# Patient Record
Sex: FEMALE | Race: WHITE | Employment: OTHER | URBAN - METROPOLITAN AREA
[De-identification: names, ages, dates, MRNs, and addresses within clinical notes are randomized per-mention and may not be internally consistent; named-entity substitution may affect disease eponyms.]

---

## 2019-04-08 ENCOUNTER — APPOINTMENT (OUTPATIENT)
Dept: GENERAL RADIOLOGY | Age: 79
End: 2019-04-08
Attending: EMERGENCY MEDICINE
Payer: MEDICARE

## 2019-04-08 ENCOUNTER — HOSPITAL ENCOUNTER (EMERGENCY)
Age: 79
Discharge: HOME | End: 2019-04-08
Attending: EMERGENCY MEDICINE
Payer: MEDICARE

## 2019-04-08 ENCOUNTER — APPOINTMENT (OUTPATIENT)
Dept: CT IMAGING | Age: 79
End: 2019-04-08
Attending: EMERGENCY MEDICINE
Payer: MEDICARE

## 2019-04-08 VITALS
HEART RATE: 61 BPM | TEMPERATURE: 98.3 F | DIASTOLIC BLOOD PRESSURE: 81 MMHG | OXYGEN SATURATION: 96 % | SYSTOLIC BLOOD PRESSURE: 147 MMHG | BODY MASS INDEX: 23.87 KG/M2 | RESPIRATION RATE: 18 BRPM | WEIGHT: 126.32 LBS

## 2019-04-08 DIAGNOSIS — S80.01XA CONTUSION OF RIGHT KNEE, INITIAL ENCOUNTER: ICD-10-CM

## 2019-04-08 DIAGNOSIS — S09.90XA CLOSED HEAD INJURY, INITIAL ENCOUNTER: Primary | ICD-10-CM

## 2019-04-08 DIAGNOSIS — M54.50 ACUTE MIDLINE LOW BACK PAIN WITHOUT SCIATICA: ICD-10-CM

## 2019-04-08 PROCEDURE — 74011250637 HC RX REV CODE- 250/637: Performed by: EMERGENCY MEDICINE

## 2019-04-08 PROCEDURE — 72100 X-RAY EXAM L-S SPINE 2/3 VWS: CPT

## 2019-04-08 PROCEDURE — 99283 EMERGENCY DEPT VISIT LOW MDM: CPT

## 2019-04-08 PROCEDURE — 73562 X-RAY EXAM OF KNEE 3: CPT

## 2019-04-08 PROCEDURE — 70450 CT HEAD/BRAIN W/O DYE: CPT

## 2019-04-08 RX ORDER — ACETAMINOPHEN 500 MG
1000 TABLET ORAL ONCE
Status: COMPLETED | OUTPATIENT
Start: 2019-04-08 | End: 2019-04-08

## 2019-04-08 RX ADMIN — ACETAMINOPHEN 1000 MG: 500 TABLET ORAL at 14:22

## 2019-04-08 NOTE — ED TRIAGE NOTES
Triage Note: Patient reports having a fall Saturday night after chasing groceries down a hill. Patient complains of head and tailbone pain. Unknown LOC. Patient ambulatory to room with steady gait from waiting room.

## 2019-04-08 NOTE — ED PROVIDER NOTES
The history is provided by the patient and the spouse. No  was used. Fall The accident occurred 2 days ago. The fall occurred while standing and while walking. She fell from a height of ground level. She landed on concrete. There was no blood loss. The point of impact was the head and right knee. The pain is present in the head and right knee. The pain is at a severity of 9/10. The pain is severe. She was ambulatory at the scene. There was no entrapment after the fall. There was no drug use involved in the accident. There was no alcohol use involved in the accident. Associated symptoms include headaches. Pertinent negatives include no visual change, no fever, no numbness, no abdominal pain, no bowel incontinence, no nausea, no vomiting, no hematuria, no extremity weakness, no hearing loss, no loss of consciousness, no tingling and no laceration. The risk factors include being elderly. The symptoms are aggravated by activity, ambulation, standing, extension, use of injured limb and sitting. She has tried nothing for the symptoms. The treatment provided no relief. It is unknown when the patient last had a tetanus shot. Past Medical History:  
Diagnosis Date  Arthritis  Chronic pain  Diabetes ([facility] [address]  Essential hypertension, malignant 4/6/2011  Hypertension  MI, old 2017  Pre-operative cardiovascular examination 4/6/2011  Psychiatric disorder DEPRESSION/ANXIETY  PUD (peptic ulcer disease) 1970S  Pure hypercholesterolemia 4/6/2011  Type II or unspecified type diabetes mellitus without mention of complication, not stated as uncontrolled 4/6/2011 Past Surgical History:  
Procedure Laterality Date [address] [place] [address] UNLISTED BOWEL OBSTRUCTION X2  
 ABDOMEN SURGERY PROC UNLISTED    
 GASTRIC BYPASS  ABDOMEN SURGERY PROC UNLISTED    
 LAP SEBASTI[place]N  
 ABDOMEN SURGERY [address] UNLISTED  HERNIA   
 HX DILATION AND CURETTAGE    
  HX HEENT    
 EARS AS CHILD  
 HX SALPINGO-OOPHORECTOMY  HX TONSILLECTOMY  NEUROLOGICAL PROCEDURE UNLISTED    
 CERV. FUSION Family History:  
Problem Relation Age of Onset  Diabetes Mother  Hypertension Mother  Heart Disease Mother  Hypertension Father  Heart Disease Father  Cancer Father  Diabetes Father  Hypertension Sister  Hypertension Brother  Cancer Paternal Grandmother  Cancer Paternal Grandfather  Hypertension Sister  Cancer Sister Social History Socioeconomic History  Marital status:  Spouse name: Not on file  Number of children: Not on file  Years of education: Not on file  Highest education level: Not on file Occupational History  Not on file Social Needs  Financial resource strain: Not on file  Food insecurity:  
  Worry: Not on file Inability: Not on file  Transportation needs:  
  Medical: Not on file Non-medical: Not on file Tobacco Use  Smoking status: Never Smoker  Smokeless tobacco: Never Used Substance and Sexual Activity  Alcohol use: Yes Comment: RARE  Drug use: No  
 Sexual activity: Never Lifestyle  Physical activity:  
  Days per week: Not on file Minutes per session: Not on file  Stress: Not on file Relationships  Social connections:  
  Talks on phone: Not on file Gets together: Not on file Attends [employer] service: Not on file Active member of club or organization: Not on file Attends meetings of clubs or organizations: Not on file Relationship status: Not on file  Intimate partner violence:  
  Fear of current or ex partner: Not on file Emotionally abused: Not on file Physically abused: Not on file Forced sexual activity: Not on file Other Topics Concern  Not on file Social History Narrative  ** Merged History Encounter **  
    
 
 
 
 ALLERGIES: Actonel [risedronate] and Penicillin g Review of Systems Constitutional: Negative for activity change, chills and fever. HENT: Negative for nosebleeds, sore throat, trouble swallowing and voice change. Eyes: Negative for visual disturbance. Respiratory: Negative for shortness of breath. Cardiovascular: Negative for chest pain and palpitations. Gastrointestinal: Negative for abdominal pain, bowel incontinence, constipation, diarrhea, nausea and vomiting. Genitourinary: Negative for difficulty urinating, dysuria, hematuria and urgency. Musculoskeletal: Positive for arthralgias and back pain. Negative for extremity weakness, neck pain and neck stiffness. Skin: Negative for color change. Allergic/Immunologic: Negative for immunocompromised state. Neurological: Positive for headaches. Negative for dizziness, tingling, seizures, loss of consciousness, syncope, weakness, light-headedness and numbness. Psychiatric/Behavioral: Negative for behavioral problems, confusion, hallucinations, self-injury and suicidal ideas. Vitals:  
 04/08/19 1401 BP: 156/87 Pulse: 68 Resp: 18 Temp: 98.7 °F (37.1 °C) SpO2: 96% Weight: 57.3 kg (126 lb 5.2 oz) Physical Exam  
Constitutional: She appears well-developed and well-nourished. No distress. HENT:  
Head: Atraumatic. Eyes: EOM are normal.  
Neck: No tracheal deviation present. Cardiovascular:  
Warm and well perfused Pulmonary/Chest: Effort normal. No respiratory distress. Musculoskeletal:  
     Right knee: She exhibits decreased range of motion, swelling and effusion. She exhibits no ecchymosis and no deformity. Lumbar back: She exhibits bony tenderness. Neurological: She is alert. Coordination normal.  
Skin: Skin is warm and dry. No laceration noted. She is not diaphoretic. Psychiatric: She has a normal mood and affect.  Her behavior is normal. Judgment and thought content normal.  
 Nursing note and vitals reviewed. MDM This is a 62-year-old female with past medical history, review of systems, physical exam as above, presenting with complaints of low back pain, head pain, and right knee pain, secondary to ground-level fall. The patient states she experienced a ground-level fall, mechanical in nature, 2 days ago, while leaning over to  groceries. She is unclear as to whether she experienced loss of consciousness, however is experiencing ongoing symptoms as above, refractory to oral narcotic pain medications. Physical exam is remarkable for well-appearing elderly female, in no acute distress, with mild effusion of the right knee, midline tenderness to palpation in the low lumbar region, midline, atraumatic head exam, nonfocal neurologic. Given time has passed since her injury, low suspicion for significant trauma, however will obtain a head CT, right knee, and lumbar films. We will reevaluate, and make a disposition based the patient's diagnostics and response to therapy. Procedures

## 2019-04-08 NOTE — ED NOTES
Pt ambulatory out of ED with discharge instructions and prescriptions in hand given by Dr. Jorje Burciaga; pt verbalized understanding of discharge paperwork and time allotted for questions. VSS. Pt alert and oriented. Pt accompanied by spouse.

## 2019-04-08 NOTE — DISCHARGE INSTRUCTIONS
Patient Education        Back Pain: Care Instructions  Your Care Instructions    Back pain has many possible causes. It is often related to problems with muscles and ligaments of the back. It may also be related to problems with the nerves, discs, or bones of the back. Moving, lifting, standing, sitting, or sleeping in an awkward way can strain the back. Sometimes you don't notice the injury until later. Arthritis is another common cause of back pain. Although it may hurt a lot, back pain usually improves on its own within several weeks. Most people recover in 12 weeks or less. Using good home treatment and being careful not to stress your back can help you feel better sooner. Follow-up care is a key part of your treatment and safety. Be sure to make and go to all appointments, and call your doctor if you are having problems. It's also a good idea to know your test results and keep a list of the medicines you take. How can you care for yourself at home? · Sit or lie in positions that are most comfortable and reduce your pain. Try one of these positions when you lie down:  ? Lie on your back with your knees bent and supported by large pillows. ? Lie on the floor with your legs on the seat of a sofa or chair. ? Lie on your side with your knees and hips bent and a pillow between your legs. ? Lie on your stomach if it does not make pain worse. · Do not sit up in bed, and avoid soft couches and twisted positions. Bed rest can help relieve pain at first, but it delays healing. Avoid bed rest after the first day of back pain. · Change positions every 30 minutes. If you must sit for long periods of time, take breaks from sitting. Get up and walk around, or lie in a comfortable position. · Try using a heating pad on a low or medium setting for 15 to 20 minutes every 2 or 3 hours. Try a warm shower in place of one session with the heating pad. · You can also try an ice pack for 10 to 15 minutes every 2 to 3 hours. Put a thin cloth between the ice pack and your skin. · Take pain medicines exactly as directed. ? If the doctor gave you a prescription medicine for pain, take it as prescribed. ? If you are not taking a prescription pain medicine, ask your doctor if you can take an over-the-counter medicine. · Take short walks several times a day. You can start with 5 to 10 minutes, 3 or 4 times a day, and work up to longer walks. Walk on level surfaces and avoid hills and stairs until your back is better. · Return to work and other activities as soon as you can. Continued rest without activity is usually not good for your back. · To prevent future back pain, do exercises to stretch and strengthen your back and stomach. Learn how to use good posture, safe lifting techniques, and proper body mechanics. When should you call for help? Call your doctor now or seek immediate medical care if:    · You have new or worsening numbness in your legs.     · You have new or worsening weakness in your legs. (This could make it hard to stand up.)     · You lose control of your bladder or bowels.    Watch closely for changes in your health, and be sure to contact your doctor if:    · You have a fever, lose weight, or don't feel well.     · You do not get better as expected. Where can you learn more? Go to http://radha-birdie.info/. Enter Z266 in the search box to learn more about \"Back Pain: Care Instructions. \"  Current as of: September 20, 2018  Content Version: 11.9  © 1064-6303 [employer]. Care instructions adapted under license by [employer] (which disclaims liability or warranty for this information). If you have questions about a medical condition or this instruction, always ask your healthcare professional. [place] [address] any warranty or liability for your use of this information.          Patient Education     Contusion: Care Instructions  Your Care Instructions  Contusion is the medical term for a bruise. It is the result of a direct blow or an impact, such as a fall. Contusions are common sports injuries. Most people think of a bruise as a black-and-blue spot. This happens when small blood vessels get torn and leak blood under the skin. But bones, muscles, and organs can also get bruised. This may damage deep tissues but not cause a bruise you can see. The doctor will do a physical exam to find the location of your contusion. You may also have tests to make sure you do not have a more serious injury, such as a broken bone or nerve damage. These may include X-rays or other imaging tests like a CT scan or MRI. Deep-tissue contusions may cause pain and swelling. But if there is no serious damage, they will often get better in a few weeks with home treatment. The doctor has checked you carefully, but problems can develop later. If you notice any problems or new symptoms, get medical treatment right away. Follow-up care is a key part of your treatment and safety. Be sure to make and go to all appointments, and call your doctor if you are having problems. It's also a good idea to know your test results and keep a list of the medicines you take. How can you care for yourself at home? · Put ice or a cold pack on the sore area for 10 to 20 minutes at a time to stop swelling. Put a thin cloth between the ice pack and your skin. · Be safe with medicines. Read and follow all instructions on the label. ¨ If the doctor gave you a prescription medicine for pain, take it as prescribed. ¨ If you are not taking a prescription pain medicine, ask your doctor if you can take an over-the-counter medicine. · If you can, prop up the sore area on pillows as much as possible for the next few days. Try to keep the sore area above the level of your heart. When should you call for help? Call your doctor now or seek immediate medical care if:  · Your pain gets worse.   · You have new or worse swelling. · You have tingling, weakness, or numbness in the area near the contusion. · The area near the contusion is cold or pale. Watch closely for changes in your health, and be sure to contact your doctor if:  · You do not get better as expected. Where can you learn more? Go to Cleanify.be  Enter [id number] in the search box to learn more about \"Contusion: Care Instructions. \"   © 7400-8569 [employer]. Care instructions adapted under license by [facility] [employer] (which disclaims liability or warranty for this information). This care instruction is for use with your licensed healthcare professional. If you have questions about a medical condition or this instruction, always ask your healthcare professional. [address] any warranty or liability for your use of this information. Content Version: 67.8.857213; Current as of: May 22, 2015           Patient Education        Learning About a Closed Head Injury  What is a closed head injury? A closed head injury happens when your head gets hit hard. The strong force of the blow causes your brain to shake in your skull. This movement can cause the brain to bruise, swell, or tear. Sometimes nerves or blood vessels also get damaged. This can cause bleeding in or around the brain. A concussion is a type of closed head injury. What are the symptoms? If you have a mild concussion, you may have a mild headache or feel \"not quite right. \" These symptoms are common. They usually go away over a few days to 4 weeks. But sometimes after a concussion, you feel like you can't function as well as before the injury. And you have new symptoms. This is called postconcussive syndrome. You may:  · Find it harder to solve problems, think, concentrate, or remember. · Have headaches. · Have changes in your sleep patterns, such as not being able to sleep or sleeping all the time.   · Have changes in your personality. · Not be interested in your usual activities. · Feel angry or anxious without a clear reason. · Lose your sense of taste or smell. · Be dizzy, lightheaded, or unsteady. It may be hard to stand or walk. How is a closed head injury treated? Any person who may have a concussion needs to see a doctor. Some people have to stay in the hospital to be watched. Others can go home safely. If you go home, follow your doctor's instructions. He or she will tell you if you need someone to watch you closely for the next 24 hours or longer. Rest is the best treatment. Get plenty of sleep at night. And try to rest during the day. · Avoid activities that are physically or mentally demanding. These include housework, exercise, and schoolwork. And don't play video games, send text messages, or use the computer. You may need to change your school or work schedule to be able to avoid these activities. · Ask your doctor when it's okay to drive, ride a bike, or operate machinery. · Take an over-the-counter pain medicine, such as acetaminophen (Tylenol), ibuprofen (Advil, Motrin), or naproxen (Aleve). Be safe with medicines. Read and follow all instructions on the label. · Check with your doctor before you use any other medicines for pain. · Do not drink alcohol or use illegal drugs. They can slow recovery. They can also increase your risk of getting a second head injury. Follow-up care is a key part of your treatment and safety. Be sure to make and go to all appointments, and call your doctor if you are having problems. It's also a good idea to know your test results and keep a list of the medicines you take. Where can you learn more? Go to http://radha-birdie.info/. Enter E235 in the search box to learn more about \"Learning About a Closed Head Injury. \"  Current as of: Ladan 3, 2018  Content Version: 11.9  © 6708-6124 [employer].  Care instructions adapted under license by [employer] (which disclaims liability or warranty for this information). If you have questions about a medical condition or this instruction, always ask your healthcare professional. [address] any warranty or liability for your use of this information.

## 2019-05-01 ENCOUNTER — HOSPITAL ENCOUNTER (EMERGENCY)
Age: 79
Discharge: HOME | End: 2019-05-01
Attending: STUDENT IN AN ORGANIZED HEALTH CARE EDUCATION/TRAINING PROGRAM
Payer: MEDICARE

## 2019-05-01 ENCOUNTER — APPOINTMENT (OUTPATIENT)
Dept: CT IMAGING | Age: 79
End: 2019-05-01
Attending: STUDENT IN AN ORGANIZED HEALTH CARE EDUCATION/TRAINING PROGRAM
Payer: MEDICARE

## 2019-05-01 VITALS
WEIGHT: 131.39 LBS | RESPIRATION RATE: 18 BRPM | OXYGEN SATURATION: 96 % | HEART RATE: 99 BPM | TEMPERATURE: 98 F | BODY MASS INDEX: 24.81 KG/M2 | SYSTOLIC BLOOD PRESSURE: 161 MMHG | HEIGHT: 61 IN | DIASTOLIC BLOOD PRESSURE: 91 MMHG

## 2019-05-01 DIAGNOSIS — S09.90XA MINOR HEAD INJURY, INITIAL ENCOUNTER: ICD-10-CM

## 2019-05-01 DIAGNOSIS — S00.83XA FACIAL CONTUSION, INITIAL ENCOUNTER: ICD-10-CM
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
## 2019-05-01 PROCEDURE — 99283 EMERGENCY DEPT VISIT LOW MDM: CPT

## 2019-05-01 PROCEDURE — 70450 CT HEAD/BRAIN W/O DYE: CPT

## 2019-05-01 NOTE — ED TRIAGE NOTES
Pt arrives ambulatory to ed s/p fall on Monday. Pt states she fell stepping up into doorway. Pt states she hit her head, ecchymosis noted to left side forehead and intermittent blurriness to left eye. Unknown LOC, fall unwitnessed. Pt states she takes a baby ASA daily.

## 2019-05-02 NOTE — DISCHARGE INSTRUCTIONS
Patient Education        Learning About a Closed Head Injury  What is a closed head injury? A closed head injury happens when your head gets hit hard. The strong force of the blow causes your brain to shake in your skull. This movement can cause the brain to bruise, swell, or tear. Sometimes nerves or blood vessels also get damaged. This can cause bleeding in or around the brain. A concussion is a type of closed head injury. What are the symptoms? If you have a mild concussion, you may have a mild headache or feel \"not quite right. \" These symptoms are common. They usually go away over a few days to 4 weeks. But sometimes after a concussion, you feel like you can't function as well as before the injury. And you have new symptoms. This is called postconcussive syndrome. You may:  · Find it harder to solve problems, think, concentrate, or remember. · Have headaches. · Have changes in your sleep patterns, such as not being able to sleep or sleeping all the time. · Have changes in your personality. · Not be interested in your usual activities. · Feel angry or anxious without a clear reason. · Lose your sense of taste or smell. · Be dizzy, lightheaded, or unsteady. It may be hard to stand or walk. How is a closed head injury treated? Any person who may have a concussion needs to see a doctor. Some people have to stay in the hospital to be watched. Others can go home safely. If you go home, follow your doctor's instructions. He or she will tell you if you need someone to watch you closely for the next 24 hours or longer. Rest is the best treatment. Get plenty of sleep at night. And try to rest during the day. · Avoid activities that are physically or mentally demanding. These include housework, exercise, and schoolwork. And don't play video games, send text messages, or use the computer. You may need to change your school or work schedule to be able to avoid these activities.   · Ask your doctor when it's okay to drive, ride a bike, or operate machinery. · Take an over-the-counter pain medicine, such as acetaminophen (Tylenol), ibuprofen (Advil, Motrin), or naproxen (Aleve). Be safe with medicines. Read and follow all instructions on the label. · Check with your doctor before you use any other medicines for pain. · Do not drink alcohol or use illegal drugs. They can slow recovery. They can also increase your risk of getting a second head injury. Follow-up care is a key part of your treatment and safety. Be sure to make and go to all appointments, and call your doctor if you are having problems. It's also a good idea to know your test results and keep a list of the medicines you take. Where can you learn more? Go to http://radha-birdie.info/. Enter E235 in the search box to learn more about \"Learning About a Closed Head Injury. \"  Current as of: Ladan 3, 2018  Content Version: 11.9  © 3460-6313 [employer]. Care instructions adapted under license by [employer] (which disclaims liability or warranty for this information). If you have questions about a medical condition or this instruction, always ask your healthcare professional. [place] [address] any warranty or liability for your use of this information. Patient Education        Preventing Outdoor Falls: Care Instructions  Your Care Instructions    Worries about falls don't need to keep you indoors. Outdoor activities like walking have big benefits for your health. You will need to watch your step and learn a few safety measures. If you are worried about having a fall outdoors, ask your doctor about exercises, classes, or physical therapy that may help. You can learn ways to gain strength, flexibility, and balance. Ask if it might help to use a cane or walker. You can make your time outdoors safer with a few simple measures. Follow-up care is a key part of your treatment and safety.  Be sure to make and go to all appointments, and call your doctor if you are having problems. It's also a good idea to know your test results and keep a list of the medicines you take. How can you prevent falls outdoors? · Wear shoes with firm soles and low heels. If you have to walk on an icy surface, use grippers that can be worn over your shoes in bad weather. · Be extra careful if weather is bad. Walk on the grass when the sidewalks are slick. If you live in a place that gets snow and ice in the winter, sprinkle salt on slippery stairs and sidewalks. · Be careful getting on or off buses and trains or getting in and out of cars. If handrails are available, use them. · Be careful when you cross the street. Look for crosswalks or places where curb cuts or ramps are present. · Try not to hurry, especially if you are carrying something. · Be cautious in parking lots or garages. There may be curbs or changes in pavement, or the height of the pavement may vary. · Make sure to wear the correct eyeglasses, if you need them. Reading glasses or bifocals can make it harder to see hazards that might be in your way. · If you are walking outdoors for exercise, try to:  ? Walk in well-lighted, well-maintained areas. These include high school or college tracks, shopping malls, and public spaces. ? Walk with a partner. ? Watch out for cracked sidewalks, curbs, changes in the height of the pavement, exposed tree roots, and debris such as fallen leaves or branches. Where can you learn more? Go to http://radha-birdie.info/. Enter F774 in the search box to learn more about \"Preventing Outdoor Falls: Care Instructions. \"  Current as of: March 15, 2018  Content Version: 11.9  © 2675-7808 [employer]. Care instructions adapted under license by [employer] (which disclaims liability or warranty for this information).  If you have questions about a medical condition or this instruction, always ask your healthcare professional. [place] [address] any warranty or liability for your use of this information.

## 2019-05-02 NOTE — ED NOTES
Provided with d/c instructions, pt verbalized understanding. Ambulated out with steady gait, no changes to previous assessment except as noted.

## 2019-05-02 NOTE — ED PROVIDER NOTES
The history is provided by the patient and the spouse. Fall The accident occurred 2 days ago. The fall occurred while walking (tripped over door ledge while entering house). She fell from a height of ground level. She landed on hard floor. There was no blood loss. The point of impact was the head. The pain is present in the head (L forehead and L upper face). The pain is moderate. She was ambulatory at the scene. There was no entrapment after the fall. Associated symptoms include visual change (mild L blurry vision) and headaches. Pertinent negatives include no fever, no numbness, no abdominal pain, no vomiting, no extremity weakness, no hearing loss, no loss of consciousness, no tingling and no laceration. The risk factors include being elderly and recurrent falls (on ASA). The symptoms are aggravated by pressure on injury. Treatments tried: appplied makeup over injured area due to [address] Past Medical History:  
Diagnosis Date  Arthritis  Chronic pain  Diabetes ([facility] [address]  Essential hypertension, malignant 4/6/2011  Hypertension  MI, old 2017  Pre-operative cardiovascular examination 4/6/2011  Psychiatric disorder DEPRESSION/ANXIETY  PUD (peptic ulcer disease) 1970S  Pure hypercholesterolemia 4/6/2011  Type II or unspecified type diabetes mellitus without mention of complication, not stated as uncontrolled 4/6/2011 Past Surgical History:  
Procedure Laterality Date [address] UNLISTED BOWEL OBSTRUCTION X2  
 ABDOMEN SURGERY PROC UNLISTED    
 GASTRIC BYPASS  ABDOMEN SURGERY PROC UNLISTED    
 LAP SEBASTIÁN  
 ABDOMEN SURGERY [address] UNLISTED HERNIA   
 HX DILATION AND CURETTAGE    
 HX HEENT    
 EARS AS CHILD  
 HX SALPINGO-OOPHORECTOMY  HX TONSILLECTOMY  NEUROLOGICAL PROCEDURE UNLISTED    
 CERV. FUSION Family History:  
Problem Relation Age of Onset  Diabetes Mother  Hypertension Mother  Heart Disease Mother  Hypertension Father  Heart Disease Father  Cancer Father  Diabetes Father  Hypertension Sister  Hypertension Brother  Cancer Paternal Grandmother  Cancer Paternal Grandfather  Hypertension Sister  Cancer Sister Social History Socioeconomic History  Marital status:  Spouse name: Not on file  Number of children: Not on file  Years of education: Not on file  Highest education level: Not on file Occupational History  Not on file Social Needs  Financial resource strain: Not on file  Food insecurity:  
  Worry: Not on file Inability: Not on file  Transportation needs:  
  Medical: Not on file Non-medical: Not on file Tobacco Use  Smoking status: Never Smoker  Smokeless tobacco: Never Used Substance and Sexual Activity  Alcohol use: Yes Comment: RARE  Drug use: No  
 Sexual activity: Never Lifestyle  Physical activity:  
  Days per week: Not on file Minutes per session: Not on file  Stress: Not on file Relationships  Social connections:  
  Talks on phone: Not on file Gets together: Not on file Attends Hindu service: Not on file Active member of club or organization: Not on file Attends meetings of clubs or organizations: Not on file Relationship status: Not on file  Intimate partner violence:  
  Fear of current or ex partner: Not on file Emotionally abused: Not on file Physically abused: Not on file Forced sexual activity: Not on file Other Topics Concern  Not on file Social History Narrative ** Merged History Encounter ** ALLERGIES: Actonel [risedronate] and Penicillin g Review of Systems Constitutional: Negative for chills and fever. HENT: Positive for facial swelling (minimal over L forehead). Negative for sore throat. Eyes: Positive for visual disturbance (L blurry vision). Respiratory: Negative for cough and shortness of breath. Cardiovascular: Negative for chest pain. Gastrointestinal: Negative for abdominal pain and vomiting. Genitourinary: Negative for dysuria. Musculoskeletal: Negative for back pain and extremity weakness. Skin: Positive for color change (L forehead brusing). Negative for rash. Neurological: Positive for headaches. Negative for tingling, loss of consciousness, syncope, facial asymmetry, weakness and numbness. All other systems reviewed and are negative. Vitals:  
 05/01/19 1930 05/01/19 1934 BP:  (!) 161/91 Pulse:  99 Resp:  18 Temp:  98 °F (36.7 °C) SpO2:  96% Weight: 59.6 kg (131 lb 6.3 oz) Height: 5' 1\" (1.549 m) Physical Exam  
Constitutional: She is oriented to person, place, and time. She appears well-developed. No distress. HENT:  
Head: Normocephalic. Head is with contusion. Head is without raccoon's eyes, without Courtney's sign, without laceration and without left periorbital erythema. Eyes: Pupils are equal, round, and reactive to light. Conjunctivae, EOM and lids are normal. Right eye exhibits no nystagmus. Left eye exhibits no nystagmus. Neck: Normal range of motion. Neck supple. No spinous process tenderness present. Normal range of motion present. Cardiovascular: Normal rate, regular rhythm and normal heart sounds. Pulmonary/Chest: Effort normal and breath sounds normal. No respiratory distress. Abdominal: Soft. There is no tenderness. There is no guarding. Musculoskeletal: Normal range of motion. She exhibits no edema. Neurological: She is alert and oriented to person, place, and time. She exhibits normal muscle tone. Skin: Skin is warm and dry. No laceration noted. MDM Procedures 8:28 PM 
The patient presented with a complaint of a fall or minor trauma.  The patient is now resting comfortably and feels better, is alert and in no distress. The patient has a normal mental status and is neurologically intact. The history, exam, diagnostic testing (if any) and current condition do not demonstrate signs of clinically significant intra-cranial, intra-thoracic, intra-abdominal, or musculoskeletal trauma. The vital signs have been stable. The patient's condition is stable and appropriate for discharge. The patient will pursue further outpatient evaluation with the primary care physician or other designated or consulting physician as indicated in the discharge instructions.

## 2021-06-11 ENCOUNTER — VIRTUAL VISIT (OUTPATIENT)
Dept: FAMILY MEDICINE CLINIC | Age: 81
End: 2021-06-11

## 2021-08-18 ENCOUNTER — TELEPHONE (OUTPATIENT)
Dept: FAMILY MEDICINE CLINIC | Age: 81
End: 2021-08-18

## 2021-08-18 NOTE — TELEPHONE ENCOUNTER
----- Message from [facility] sent at 8/16/2021  4:36 PM EDT -----  Regarding: MD Brown/telephone  General Message/Vendor Calls    Caller's first and last name:N/A      Reason for call: NP Appoinment      Callback required yes/no and why:      Best contact number(s):[phone]      Details to clarify the request:Patient missed NP appointment due to thinking it was for another day and would like to reschedule it.       Chelsi Lewis

## 2022-10-13 ENCOUNTER — APPOINTMENT (OUTPATIENT)
Dept: GENERAL RADIOLOGY | Age: 82
DRG: 316 | End: 2022-10-13
Attending: STUDENT IN AN ORGANIZED HEALTH CARE EDUCATION/TRAINING PROGRAM
Payer: MEDICARE

## 2022-10-13 ENCOUNTER — APPOINTMENT (OUTPATIENT)
Dept: CT IMAGING | Age: 82
DRG: 316 | End: 2022-10-13
Attending: STUDENT IN AN ORGANIZED HEALTH CARE EDUCATION/TRAINING PROGRAM
Payer: MEDICARE

## 2022-10-13 ENCOUNTER — HOSPITAL ENCOUNTER (INPATIENT)
Age: 82
LOS: 4 days | Discharge: HOME | DRG: 316 | End: 2022-10-17
Attending: STUDENT IN AN ORGANIZED HEALTH CARE EDUCATION/TRAINING PROGRAM | Admitting: FAMILY MEDICINE
Payer: MEDICARE

## 2022-10-13 DIAGNOSIS — I50.9 ACUTE ON CHRONIC CONGESTIVE HEART FAILURE, UNSPECIFIED HEART FAILURE TYPE (HCC): Primary | ICD-10-CM

## 2022-10-13 LAB
ALBUMIN SERPL-MCNC: 3.5 G/DL (ref 3.5–5)
ALBUMIN/GLOB SERPL: 0.8 {RATIO} (ref 1.1–2.2)
ALP SERPL-CCNC: 131 U/L (ref 45–117)
ALT SERPL-CCNC: 21 U/L (ref 12–78)
ANION GAP SERPL CALC-SCNC: 9 MMOL/L (ref 5–15)
AST SERPL-CCNC: 25 U/L (ref 15–37)
BASOPHILS # BLD: 0.1 K/UL (ref 0–0.1)
BASOPHILS NFR BLD: 1 % (ref 0–1)
BILIRUB SERPL-MCNC: 0.4 MG/DL (ref 0.2–1)
BNP SERPL-MCNC: 451 PG/ML (ref 0–450)
BUN SERPL-MCNC: 8 MG/DL (ref 6–20)
BUN/CREAT SERPL: 7 (ref 12–20)
CALCIUM SERPL-MCNC: 9.1 MG/DL (ref 8.5–10.1)
CHLORIDE SERPL-SCNC: 101 MMOL/L (ref 97–108)
CO2 SERPL-SCNC: 28 MMOL/L (ref 21–32)
CREAT SERPL-MCNC: 1.19 MG/DL (ref 0.55–1.02)
DIFFERENTIAL METHOD BLD: ABNORMAL
EOSINOPHIL # BLD: 0.1 K/UL (ref 0–0.4)
EOSINOPHIL NFR BLD: 1 % (ref 0–7)
ERYTHROCYTE [DISTWIDTH] IN BLOOD BY AUTOMATED COUNT: 19.2 % (ref 11.5–14.5)
GLOBULIN SER CALC-MCNC: 4.6 G/DL (ref 2–4)
GLUCOSE SERPL-MCNC: 193 MG/DL (ref 65–100)
HCT VFR BLD AUTO: 39.4 % (ref 35–47)
HGB BLD-MCNC: 12.4 G/DL (ref 11.5–16)
IMM GRANULOCYTES # BLD AUTO: 0 K/UL (ref 0–0.04)
IMM GRANULOCYTES NFR BLD AUTO: 0 % (ref 0–0.5)
LYMPHOCYTES # BLD: 1 K/UL (ref 0.8–3.5)
LYMPHOCYTES NFR BLD: 9 % (ref 12–49)
MCH RBC QN AUTO: 26.3 PG (ref 26–34)
MCHC RBC AUTO-ENTMCNC: 31.5 G/DL (ref 30–36.5)
MCV RBC AUTO: 83.5 FL (ref 80–99)
MONOCYTES # BLD: 0.8 K/UL (ref 0–1)
MONOCYTES NFR BLD: 8 % (ref 5–13)
NEUTS SEG # BLD: 8.6 K/UL (ref 1.8–8)
NEUTS SEG NFR BLD: 81 % (ref 32–75)
NRBC # BLD: 0 K/UL (ref 0–0.01)
NRBC BLD-RTO: 0 PER 100 WBC
PLATELET # BLD AUTO: 428 K/UL (ref 150–400)
PMV BLD AUTO: 9.5 FL (ref 8.9–12.9)
POTASSIUM SERPL-SCNC: 3.1 MMOL/L (ref 3.5–5.1)
PROT SERPL-MCNC: 8.1 G/DL (ref 6.4–8.2)
RBC # BLD AUTO: 4.72 M/UL (ref 3.8–5.2)
SODIUM SERPL-SCNC: 138 MMOL/L (ref 136–145)
TROPONIN-HIGH SENSITIVITY: 8 NG/L (ref 0–51)
WBC # BLD AUTO: 10.6 K/UL (ref 3.6–11)

## 2022-10-13 PROCEDURE — 83880 ASSAY OF NATRIURETIC PEPTIDE: CPT

## 2022-10-13 PROCEDURE — 93005 ELECTROCARDIOGRAM TRACING: CPT

## 2022-10-13 PROCEDURE — 85025 COMPLETE CBC W/AUTO DIFF WBC: CPT

## 2022-10-13 PROCEDURE — 36415 COLL VENOUS BLD VENIPUNCTURE: CPT

## 2022-10-13 PROCEDURE — 65270000046 HC RM TELEMETRY

## 2022-10-13 PROCEDURE — 74011000636 HC RX REV CODE- 636: Performed by: FAMILY MEDICINE

## 2022-10-13 PROCEDURE — 71275 CT ANGIOGRAPHY CHEST: CPT

## 2022-10-13 PROCEDURE — 71046 X-RAY EXAM CHEST 2 VIEWS: CPT

## 2022-10-13 PROCEDURE — 74011250637 HC RX REV CODE- 250/637: Performed by: STUDENT IN AN ORGANIZED HEALTH CARE EDUCATION/TRAINING PROGRAM

## 2022-10-13 PROCEDURE — 74011250636 HC RX REV CODE- 250/636: Performed by: EMERGENCY MEDICINE

## 2022-10-13 PROCEDURE — 99285 EMERGENCY DEPT VISIT HI MDM: CPT

## 2022-10-13 PROCEDURE — 74011250636 HC RX REV CODE- 250/636: Performed by: STUDENT IN AN ORGANIZED HEALTH CARE EDUCATION/TRAINING PROGRAM

## 2022-10-13 PROCEDURE — 80053 COMPREHEN METABOLIC PANEL: CPT

## 2022-10-13 PROCEDURE — 84484 ASSAY OF TROPONIN QUANT: CPT

## 2022-10-13 RX ORDER — AZITHROMYCIN 250 MG/1
250 TABLET, FILM COATED ORAL DAILY
COMMUNITY
End: 2022-10-17

## 2022-10-13 RX ORDER — POTASSIUM CHLORIDE 750 MG/1
20 TABLET, FILM COATED, EXTENDED RELEASE ORAL
Status: COMPLETED | OUTPATIENT
Start: 2022-10-13 | End: 2022-10-13

## 2022-10-13 RX ORDER — FUROSEMIDE 10 MG/ML
20 INJECTION INTRAMUSCULAR; INTRAVENOUS
Status: COMPLETED | OUTPATIENT
Start: 2022-10-13 | End: 2022-10-13

## 2022-10-13 RX ORDER — HYDRALAZINE HYDROCHLORIDE 20 MG/ML
20 INJECTION INTRAMUSCULAR; INTRAVENOUS ONCE
Status: COMPLETED | OUTPATIENT
Start: 2022-10-13 | End: 2022-10-13

## 2022-10-13 RX ADMIN — IOPAMIDOL 80 ML: 755 INJECTION, SOLUTION INTRAVENOUS at 20:47

## 2022-10-13 RX ADMIN — POTASSIUM CHLORIDE 20 MEQ: 750 TABLET, FILM COATED, EXTENDED RELEASE ORAL at 21:23

## 2022-10-13 RX ADMIN — HYDRALAZINE HYDROCHLORIDE 20 MG: 20 INJECTION INTRAMUSCULAR; INTRAVENOUS at 22:44

## 2022-10-13 RX ADMIN — FUROSEMIDE 20 MG: 10 INJECTION, SOLUTION INTRAVENOUS at 21:23

## 2022-10-13 NOTE — DISCHARGE INSTRUCTIONS
Discharge Instructions       PATIENT ID: Leanne Uribe  MRN: [id number]   YOB: 1940    DATE OF ADMISSION: 10/13/2022  5:26 PM    DATE OF DISCHARGE: 10/17/2022    PRIMARY CARE PROVIDER: None     ATTENDING PHYSICIAN: Yolanda Haskins MD  DISCHARGING PROVIDER: Mariana Prasad PA-C    To contact this individual call [phone] and ask the  to page. If unavailable ask to be transferred the Adult Hospitalist Department. DISCHARGE DIAGNOSES Hypertensive urgency    CONSULTATIONS: IP CONSULT TO CARDIOLOGY  IP CONSULT TO CARDIOLOGY  IP CONSULT TO PULMONOLOGY    PROCEDURES/SURGERIES: * No surgery found *    PENDING TEST RESULTS:   At the time of discharge the following test results are still pending: None    FOLLOW UP APPOINTMENTS:   Follow-up Information       Follow up With Specialties Details Why Contact Info    Stephanie Ruano MD Cardiovascular Disease Physician Go on 10/27/2022 at 12:30 on 10/27/22 Edmar Su Robles  [phone]      None    None (395) Patient stated that they have no PCP               ADDITIONAL CARE RECOMMENDATIONS: Follow up with PCP, pulmonology, cardiology as directed. DIET: Regular Diet    ACTIVITY: Activity as tolerated    WOUND CARE:     EQUIPMENT needed:       DISCHARGE MEDICATIONS:   See Medication Reconciliation Form    It is important that you take the medication exactly as they are prescribed. Keep your medication in the bottles provided by the pharmacist and keep a list of the medication names, dosages, and times to be taken in your wallet. Do not take other medications without consulting your doctor. NOTIFY YOUR PHYSICIAN FOR ANY OF THE FOLLOWING:   Fever over 101 degrees for 24 hours. Chest pain, shortness of breath, fever, chills, nausea, vomiting, diarrhea, change in mentation, falling, weakness, bleeding. Severe pain or pain not relieved by medications.   Or, any other signs or symptoms that you may have questions about.       DISPOSITION:    Home With:   OT  PT  HH  RN       SNF/Inpatient Rehab/LTAC    Independent/assisted living    Hospice    Other:     CDMP Checked:   Yes x     PROBLEM LIST Updated:  Yes x       Signed:   Joe Mckinney PA-C  10/17/2022  1:03 PM

## 2022-10-13 NOTE — ED PROVIDER NOTES
Jude Solorio is a 81 y.o. female with past medical history notable for arthritis, chronic pain, diabetes, hypertension, MI, peptic ulcer disease, hypercholesterolemia presenting with 1 week of gradually worsening dyspnea on exertion, orthopnea and cough. The cough is nonproductive. Denies chest pain recently. Has had a stable medication regimen but has not taken her blood pressure medication for the last several days \"did not feel like it because I was not feeling well\". Does note that her symptoms are worse with laying flat or any exertion review of her medications shows that she was on furosemide daily. Past Medical History:   Diagnosis Date    Arthritis     Chronic pain     Diabetes ([facility] [address]     Essential hypertension, malignant 4/6/2011    Hypertension     MI, old 2017    Pre-operative cardiovascular examination 4/6/2011    Psychiatric disorder     DEPRESSION/ANXIETY    PUD (peptic ulcer disease)     1970S    Pure hypercholesterolemia 4/6/2011    Type II or unspecified type diabetes mellitus without mention of complication, not stated as uncontrolled 4/6/2011       Past Surgical History:   Procedure Laterality Date    HX DILATION AND CURETTAGE      HX HEENT      EARS AS CHILD    HX SALPINGO-OOPHORECTOMY      HX TONSILLECTOMY      NEUROLOGICAL PROCEDURE UNLISTED      CERV.  FUSION    [place] ABDOMEN SURGERY PROC UNLISTED      BOWEL OBSTRUCTION X2    [place] ABDOMEN SURGERY PROC UNLISTED      GASTRIC BYPASS    [place] ABDOMEN SURGERY PROC UNLISTED      LAP SEBASTIÁN    [place] ABDOMEN SURGERY PROC UNLISTED      HERNIA          Family History:   Problem Relation Age of Onset    Diabetes Mother     Hypertension Mother     Heart Disease Mother     Hypertension Father     Heart Disease Father     Cancer Father     Diabetes Father     Hypertension Sister     Hypertension Brother     Cancer Paternal Grandmother     Cancer Paternal Grandfather     Hypertension Sister     Cancer Sister        Social History     Socioeconomic History Marital status:      Spouse name: Not on file    Number of children: Not on file    Years of education: Not on file    Highest education level: Not on file   Occupational History    Not on file   Tobacco Use    Smoking status: Never    Smokeless tobacco: Never   Substance and Sexual Activity    Alcohol use: Yes     Comment: RARE    Drug use: No    Sexual activity: Never   Other Topics Concern    Not on file   Social History Narrative    ** Merged History Encounter **          Social Determinants of Health     Financial Resource Strain: Not on file   Food Insecurity: Not on file   Transportation Needs: Not on file   Physical Activity: Not on file   Stress: Not on file   Social Connections: Not on file   Intimate Partner Violence: Not on file   Housing Stability: Not on file         ALLERGIES: Actonel [risedronate] and Penicillin g    Review of Systems   Constitutional:  Positive for fatigue. Negative for chills and fever. Eyes:  Negative for photophobia. Respiratory:  Positive for shortness of breath. Negative for cough. Cardiovascular:  Negative for chest pain and leg swelling. Gastrointestinal:  Positive for nausea and vomiting. Negative for abdominal pain. Genitourinary:  Negative for dysuria. Musculoskeletal:  Negative for back pain. Neurological:  Negative for headaches. Psychiatric/Behavioral:  Negative for confusion. All other systems reviewed and are negative. Vitals:    10/13/22 1727   BP: (!) 182/74   Pulse: 88   Resp: 20   Temp: 97.8 °F (36.6 °C)   SpO2: 95%   Weight: 75.3 kg (166 lb 0.1 oz)   Height: 5' (1.524 m)            Physical Exam  Vitals reviewed. Constitutional:       General: She is not in acute distress. Appearance: She is not toxic-appearing. HENT:      Head: Normocephalic and atraumatic. Mouth/Throat:      Mouth: Mucous membranes are moist.   Eyes:      Extraocular Movements: Extraocular movements intact.    Cardiovascular:      Rate and Rhythm: Normal rate and regular rhythm. Heart sounds: Normal heart sounds. Pulmonary:      Effort: Pulmonary effort is normal. No respiratory distress. Breath sounds: Normal breath sounds. Abdominal:      Palpations: Abdomen is soft. Tenderness: There is no abdominal tenderness. Musculoskeletal:      Cervical back: Normal range of motion. Right lower leg: No edema. Left lower leg: No edema. Skin:     Capillary Refill: Capillary refill takes less than 2 seconds. Neurological:      General: No focal deficit present. Mental Status: She is alert and oriented to person, place, and time. Psychiatric:         Mood and Affect: Mood normal.        MDM     Amount and/or Complexity of Data Reviewed  Clinical lab tests: reviewed  Tests in the radiology section of CPT®: reviewed  Tests in the medicine section of CPT®: reviewed           MEDICAL DECISION MAKIN [address] for Admission  8:17 PM    ED Room Number: SER01/01  Patient Name and age:  Pawel Blood 81 y.o.  female  Working Diagnosis:   1. Acute on chronic congestive heart failure, unspecified heart failure type ([facility] [address]        COVID-19 Suspicion:  no  Sepsis present:  no  Reassessment needed: no  Code Status:  Full Code  Readmission: no  Isolation Requirements:  no  Recommended Level of Care:  telemetry  Department:[place] [facility] - [phone]  Other:     81 y.o. female presents with Shortness of Breath and Cough    On further questioning she relates that she had a heart attack in 2017 and had a resultant EF of 10%. She has been on Lasix chronically. She has missed doses recently. She states that she had an issue with one of the members of her primary care physician staff and so has not been seen recently. She is without a primary care physician. She was seen at [facility] and had a negative work-up essentially including an x-ray. She not have a CT at that time. Pain 1 now.   Given debilitating dyspnea on exertion and orthopnea, history of ischemic cardiomyopathy would admit for further evaluation, repeat echocardiogram and cardiology evaluation. She is seen by Dr. Anna Antonio of [place] cardiovascular specialist.  LABORATORY TESTS:  Labs Reviewed   CBC WITH AUTOMATED DIFF - Abnormal; Notable for the following components:       Result Value    RDW 19.2 (*)     PLATELET 594 (*)     NEUTROPHILS 81 (*)     LYMPHOCYTES 9 (*)     ABS. NEUTROPHILS 8.6 (*)     All other components within normal limits   METABOLIC PANEL, COMPREHENSIVE - Abnormal; Notable for the following components:    Potassium 3.1 (*)     Glucose 193 (*)     Creatinine 1.19 (*)     BUN/Creatinine ratio 7 (*)     eGFR 46 (*)     Alk. phosphatase 131 (*)     Globulin 4.6 (*)     A-G Ratio 0.8 (*)     All other components within normal limits   NT-PRO BNP - Abnormal; Notable for the following components:    NT pro- (*)     All other components within normal limits   TROPONIN-HIGH SENSITIVITY   SAMPLES BEING HELD       IMAGING RESULTS:  XR CHEST PA LAT   Final Result   No acute cardiopulmonary disease. CTA CHEST W OR W WO CONT    (Results Pending)       MEDICATIONS GIVEN:  Medications   furosemide (LASIX) injection 20 mg (has no administration in time range)   potassium chloride SR (KLOR-CON 10) tablet 20 mEq (has no administration in time range)       PROGRESS NOTE:   8:19 PM Patient has remained stable     EKG:  Reviewed   1743  Normal sinus rhythm, ventricular rate 82, normal axis, left axis deviation, right bundle branch block, left anterior fascicular block, no ST elevation    CONSULTS:  Hospitalist Consult: [address] [place] [address] for Admission  8:21 PM    ED Room Number: SER01/01  Patient Name and age:  Naif Hogan 81 y.o.  female  Working Diagnosis:   1. Acute on chronic congestive heart failure, unspecified heart failure type ([facility] [address]        IMPRESSION:  1.  Acute on chronic congestive heart failure, unspecified heart failure type ([facility] [address] PLAN:  - Admit to hospitalist    Erika Clark MD      Please note that this dictation was completed with [employer], the computer voice recognition software. Quite often unanticipated grammatical, syntax, homophones, and other interpretive errors are inadvertently transcribed by the computer software. Please disregard these errors. Please excuse any errors that have escaped final proofreading.       Procedures

## 2022-10-13 NOTE — ED TRIAGE NOTES
81year old female pt comes to the ED via POV for a CC Shortness of breath. Pt states that she went to [facility] a week ago and was prescribed a zpack but did not help. Pt does not have any pain and feels like there is fluid in her. Pt is A&Ox4.

## 2022-10-14 ENCOUNTER — APPOINTMENT (OUTPATIENT)
Dept: NON INVASIVE DIAGNOSTICS | Age: 82
DRG: 316 | End: 2022-10-14
Attending: STUDENT IN AN ORGANIZED HEALTH CARE EDUCATION/TRAINING PROGRAM
Payer: MEDICARE

## 2022-10-14 LAB
ALBUMIN SERPL-MCNC: 3.1 G/DL (ref 3.5–5)
ALBUMIN/GLOB SERPL: 0.8 {RATIO} (ref 1.1–2.2)
ALP SERPL-CCNC: 115 U/L (ref 45–117)
ALT SERPL-CCNC: 18 U/L (ref 12–78)
ANION GAP SERPL CALC-SCNC: 7 MMOL/L (ref 5–15)
APTT PPP: 23.3 SEC (ref 22.1–31)
ARTERIAL PATENCY WRIST A: ABNORMAL
AST SERPL-CCNC: 32 U/L (ref 15–37)
ATRIAL RATE: 82 BPM
BASE EXCESS BLD CALC-SCNC: 1.8 MMOL/L
BASOPHILS # BLD: 0.1 K/UL (ref 0–0.1)
BASOPHILS NFR BLD: 1 % (ref 0–1)
BDY SITE: ABNORMAL
BILIRUB SERPL-MCNC: 0.4 MG/DL (ref 0.2–1)
BUN SERPL-MCNC: 11 MG/DL (ref 6–20)
BUN/CREAT SERPL: 11 (ref 12–20)
CALCIUM SERPL-MCNC: 8.8 MG/DL (ref 8.5–10.1)
CALCULATED P AXIS, ECG09: -13 DEGREES
CALCULATED R AXIS, ECG10: -60 DEGREES
CALCULATED T AXIS, ECG11: 1 DEGREES
CHLORIDE SERPL-SCNC: 103 MMOL/L (ref 97–108)
CHOLEST SERPL-MCNC: 142 MG/DL
CO2 SERPL-SCNC: 26 MMOL/L (ref 21–32)
CREAT SERPL-MCNC: 0.99 MG/DL (ref 0.55–1.02)
DIAGNOSIS, 93000: NORMAL
DIFFERENTIAL METHOD BLD: ABNORMAL
ECHO AO ROOT DIAM: 3.4 CM
ECHO AO ROOT INDEX: 2.01 CM/M2
ECHO AV AREA PEAK VELOCITY: 2.9 CM2
ECHO AV AREA/BSA PEAK VELOCITY: 1.7 CM2/M2
ECHO AV PEAK GRADIENT: 11 MMHG
ECHO AV PEAK VELOCITY: 1.7 M/S
ECHO AV VELOCITY RATIO: 0.65
ECHO EST RA PRESSURE: 3 MMHG
ECHO LA DIAMETER INDEX: 1.89 CM/M2
ECHO LA DIAMETER: 3.2 CM
ECHO LA TO AORTIC ROOT RATIO: 0.94
ECHO LV E' LATERAL VELOCITY: 7 CM/S
ECHO LV E' SEPTAL VELOCITY: 6 CM/S
ECHO LV FRACTIONAL SHORTENING: 29 % (ref 28–44)
ECHO LV INTERNAL DIMENSION DIASTOLE INDEX: 3.31 CM/M2
ECHO LV INTERNAL DIMENSION DIASTOLIC: 5.6 CM (ref 3.9–5.3)
ECHO LV INTERNAL DIMENSION SYSTOLIC INDEX: 2.37 CM/M2
ECHO LV INTERNAL DIMENSION SYSTOLIC: 4 CM
ECHO LV IVSD: 1.1 CM (ref 0.6–0.9)
ECHO LV MASS 2D: 191.6 G (ref 67–162)
ECHO LV MASS INDEX 2D: 113.4 G/M2 (ref 43–95)
ECHO LV POSTERIOR WALL DIASTOLIC: 0.7 CM (ref 0.6–0.9)
ECHO LV RELATIVE WALL THICKNESS RATIO: 0.25
ECHO LVOT AREA: 4.5 CM2
ECHO LVOT DIAM: 2.4 CM
ECHO LVOT PEAK GRADIENT: 5 MMHG
ECHO LVOT PEAK VELOCITY: 1.1 M/S
ECHO MV A VELOCITY: 1.1 M/S
ECHO MV AREA PHT: 3.3 CM2
ECHO MV E DECELERATION TIME (DT): 231 MS
ECHO MV E VELOCITY: 0.55 M/S
ECHO MV E/A RATIO: 0.5
ECHO MV E/E' LATERAL: 7.86
ECHO MV E/E' RATIO (AVERAGED): 8.51
ECHO MV E/E' SEPTAL: 9.17
ECHO MV PRESSURE HALF TIME (PHT): 67 MS
ECHO MV REGURGITANT PEAK GRADIENT: 3 MMHG
ECHO MV REGURGITANT PEAK VELOCITY: 0.9 M/S
ECHO RIGHT VENTRICULAR SYSTOLIC PRESSURE (RVSP): 14 MMHG
ECHO RV FREE WALL PEAK S': 15 CM/S
ECHO RV TAPSE: 1.8 CM (ref 1.7–?)
ECHO TV REGURGITANT MAX VELOCITY: 1.63 M/S
ECHO TV REGURGITANT PEAK GRADIENT: 11 MMHG
EOSINOPHIL # BLD: 0.2 K/UL (ref 0–0.4)
EOSINOPHIL NFR BLD: 2 % (ref 0–7)
ERYTHROCYTE [DISTWIDTH] IN BLOOD BY AUTOMATED COUNT: 19.3 % (ref 11.5–14.5)
EST. AVERAGE GLUCOSE BLD GHB EST-MCNC: 151 MG/DL
GAS FLOW.O2 O2 DELIVERY SYS: ABNORMAL L/MIN
GLOBULIN SER CALC-MCNC: 4.1 G/DL (ref 2–4)
GLUCOSE BLD STRIP.AUTO-MCNC: 135 MG/DL (ref 65–117)
GLUCOSE BLD STRIP.AUTO-MCNC: 145 MG/DL (ref 65–117)
GLUCOSE BLD STRIP.AUTO-MCNC: 181 MG/DL (ref 65–117)
GLUCOSE BLD STRIP.AUTO-MCNC: 185 MG/DL (ref 65–117)
GLUCOSE SERPL-MCNC: 157 MG/DL (ref 65–100)
HBA1C MFR BLD: 6.9 % (ref 4–5.6)
HCO3 BLD-SCNC: 24.5 MMOL/L (ref 22–26)
HCT VFR BLD AUTO: 36.3 % (ref 35–47)
HDLC SERPL-MCNC: 74 MG/DL
HDLC SERPL: 1.9 {RATIO} (ref 0–5)
HGB BLD-MCNC: 11.8 G/DL (ref 11.5–16)
IMM GRANULOCYTES # BLD AUTO: 0 K/UL (ref 0–0.04)
IMM GRANULOCYTES NFR BLD AUTO: 0 % (ref 0–0.5)
INR PPP: 1 (ref 0.9–1.1)
LDLC SERPL CALC-MCNC: 55.4 MG/DL (ref 0–100)
LYMPHOCYTES # BLD: 2.1 K/UL (ref 0.8–3.5)
LYMPHOCYTES NFR BLD: 19 % (ref 12–49)
MAGNESIUM SERPL-MCNC: 2.1 MG/DL (ref 1.6–2.4)
MCH RBC QN AUTO: 26.4 PG (ref 26–34)
MCHC RBC AUTO-ENTMCNC: 32.5 G/DL (ref 30–36.5)
MCV RBC AUTO: 81.2 FL (ref 80–99)
MONOCYTES # BLD: 1.4 K/UL (ref 0–1)
MONOCYTES NFR BLD: 13 % (ref 5–13)
NEUTS SEG # BLD: 7.2 K/UL (ref 1.8–8)
NEUTS SEG NFR BLD: 65 % (ref 32–75)
NRBC # BLD: 0 K/UL (ref 0–0.01)
NRBC BLD-RTO: 0 PER 100 WBC
P-R INTERVAL, ECG05: 168 MS
PCO2 BLD: 32 MMHG (ref 35–45)
PH BLD: 7.49 [PH] (ref 7.35–7.45)
PLATELET # BLD AUTO: 446 K/UL (ref 150–400)
PMV BLD AUTO: 9.2 FL (ref 8.9–12.9)
PO2 BLD: 79 MMHG (ref 80–100)
POTASSIUM SERPL-SCNC: 3.7 MMOL/L (ref 3.5–5.1)
PROT SERPL-MCNC: 7.2 G/DL (ref 6.4–8.2)
PROTHROMBIN TIME: 9.4 SEC (ref 9–11.1)
Q-T INTERVAL, ECG07: 424 MS
QRS DURATION, ECG06: 138 MS
QTC CALCULATION (BEZET), ECG08: 495 MS
RBC # BLD AUTO: 4.47 M/UL (ref 3.8–5.2)
SAO2 % BLD: 96.7 % (ref 92–97)
SERVICE CMNT-IMP: ABNORMAL
SODIUM SERPL-SCNC: 136 MMOL/L (ref 136–145)
SPECIMEN TYPE: ABNORMAL
THERAPEUTIC RANGE,PTTT: NORMAL SECS (ref 58–77)
TRIGL SERPL-MCNC: 63 MG/DL (ref ?–150)
TSH SERPL DL<=0.05 MIU/L-ACNC: 3.07 UIU/ML (ref 0.36–3.74)
VENTRICULAR RATE, ECG03: 82 BPM
VLDLC SERPL CALC-MCNC: 12.6 MG/DL
WBC # BLD AUTO: 11 K/UL (ref 3.6–11)

## 2022-10-14 PROCEDURE — 94640 AIRWAY INHALATION TREATMENT: CPT

## 2022-10-14 PROCEDURE — 80053 COMPREHEN METABOLIC PANEL: CPT

## 2022-10-14 PROCEDURE — 74011250637 HC RX REV CODE- 250/637: Performed by: INTERNAL MEDICINE

## 2022-10-14 PROCEDURE — 74011250636 HC RX REV CODE- 250/636: Performed by: STUDENT IN AN ORGANIZED HEALTH CARE EDUCATION/TRAINING PROGRAM

## 2022-10-14 PROCEDURE — 97535 SELF CARE MNGMENT TRAINING: CPT

## 2022-10-14 PROCEDURE — 74011250637 HC RX REV CODE- 250/637: Performed by: STUDENT IN AN ORGANIZED HEALTH CARE EDUCATION/TRAINING PROGRAM

## 2022-10-14 PROCEDURE — 82803 BLOOD GASES ANY COMBINATION: CPT

## 2022-10-14 PROCEDURE — 85025 COMPLETE CBC W/AUTO DIFF WBC: CPT

## 2022-10-14 PROCEDURE — 85730 THROMBOPLASTIN TIME PARTIAL: CPT

## 2022-10-14 PROCEDURE — 83735 ASSAY OF MAGNESIUM: CPT

## 2022-10-14 PROCEDURE — 74011250637 HC RX REV CODE- 250/637: Performed by: FAMILY MEDICINE

## 2022-10-14 PROCEDURE — 82088 ASSAY OF ALDOSTERONE: CPT

## 2022-10-14 PROCEDURE — 94664 DEMO&/EVAL PT USE INHALER: CPT

## 2022-10-14 PROCEDURE — 84443 ASSAY THYROID STIM HORMONE: CPT

## 2022-10-14 PROCEDURE — 74011000250 HC RX REV CODE- 250: Performed by: STUDENT IN AN ORGANIZED HEALTH CARE EDUCATION/TRAINING PROGRAM

## 2022-10-14 PROCEDURE — 97161 PT EVAL LOW COMPLEX 20 MIN: CPT

## 2022-10-14 PROCEDURE — 97165 OT EVAL LOW COMPLEX 30 MIN: CPT

## 2022-10-14 PROCEDURE — 74011000250 HC RX REV CODE- 250: Performed by: FAMILY MEDICINE

## 2022-10-14 PROCEDURE — 84244 ASSAY OF RENIN: CPT

## 2022-10-14 PROCEDURE — 93306 TTE W/DOPPLER COMPLETE: CPT

## 2022-10-14 PROCEDURE — 82962 GLUCOSE BLOOD TEST: CPT

## 2022-10-14 PROCEDURE — 85610 PROTHROMBIN TIME: CPT

## 2022-10-14 PROCEDURE — 36415 COLL VENOUS BLD VENIPUNCTURE: CPT

## 2022-10-14 PROCEDURE — 97116 GAIT TRAINING THERAPY: CPT

## 2022-10-14 PROCEDURE — 80061 LIPID PANEL: CPT

## 2022-10-14 PROCEDURE — 83036 HEMOGLOBIN GLYCOSYLATED A1C: CPT

## 2022-10-14 PROCEDURE — 65270000046 HC RM TELEMETRY

## 2022-10-14 PROCEDURE — 36600 WITHDRAWAL OF ARTERIAL BLOOD: CPT

## 2022-10-14 PROCEDURE — 97530 THERAPEUTIC ACTIVITIES: CPT

## 2022-10-14 PROCEDURE — 74011636637 HC RX REV CODE- 636/637: Performed by: STUDENT IN AN ORGANIZED HEALTH CARE EDUCATION/TRAINING PROGRAM

## 2022-10-14 RX ORDER — SODIUM CHLORIDE 0.9 % (FLUSH) 0.9 %
5-40 SYRINGE (ML) INJECTION EVERY 8 HOURS
Status: DISCONTINUED | OUTPATIENT
Start: 2022-10-14 | End: 2022-10-17 | Stop reason: HOSPADM

## 2022-10-14 RX ORDER — CARVEDILOL 3.12 MG/1
TABLET ORAL
COMMUNITY
End: 2022-10-17

## 2022-10-14 RX ORDER — DILTIAZEM HYDROCHLORIDE 240 MG/1
240 CAPSULE, EXTENDED RELEASE ORAL DAILY
COMMUNITY

## 2022-10-14 RX ORDER — ACETAMINOPHEN 650 MG/1
650 SUPPOSITORY RECTAL
Status: DISCONTINUED | OUTPATIENT
Start: 2022-10-14 | End: 2022-10-17 | Stop reason: HOSPADM

## 2022-10-14 RX ORDER — ATORVASTATIN CALCIUM 40 MG/1
40 TABLET, FILM COATED ORAL DAILY
Status: DISCONTINUED | OUTPATIENT
Start: 2022-10-14 | End: 2022-10-17 | Stop reason: HOSPADM

## 2022-10-14 RX ORDER — SODIUM CHLORIDE 0.9 % (FLUSH) 0.9 %
5-40 SYRINGE (ML) INJECTION AS NEEDED
Status: DISCONTINUED | OUTPATIENT
Start: 2022-10-14 | End: 2022-10-17 | Stop reason: HOSPADM

## 2022-10-14 RX ORDER — ATORVASTATIN CALCIUM 40 MG/1
40 TABLET, FILM COATED ORAL DAILY
COMMUNITY
Start: 2022-08-29

## 2022-10-14 RX ORDER — IPRATROPIUM BROMIDE AND ALBUTEROL SULFATE 2.5; .5 MG/3ML; MG/3ML
3 SOLUTION RESPIRATORY (INHALATION)
Status: DISCONTINUED | OUTPATIENT
Start: 2022-10-14 | End: 2022-10-16

## 2022-10-14 RX ORDER — ASCORBIC ACID 500 MG
500 TABLET ORAL DAILY
Status: DISCONTINUED | OUTPATIENT
Start: 2022-10-15 | End: 2022-10-17 | Stop reason: HOSPADM

## 2022-10-14 RX ORDER — ONDANSETRON 4 MG/1
4 TABLET, ORALLY DISINTEGRATING ORAL
Status: DISCONTINUED | OUTPATIENT
Start: 2022-10-14 | End: 2022-10-17 | Stop reason: HOSPADM

## 2022-10-14 RX ORDER — ACETAMINOPHEN 325 MG/1
650 TABLET ORAL
Status: DISCONTINUED | OUTPATIENT
Start: 2022-10-14 | End: 2022-10-17 | Stop reason: HOSPADM

## 2022-10-14 RX ORDER — TRAZODONE HYDROCHLORIDE 100 MG/1
100 TABLET ORAL
Status: DISCONTINUED | OUTPATIENT
Start: 2022-10-14 | End: 2022-10-17 | Stop reason: HOSPADM

## 2022-10-14 RX ORDER — CARVEDILOL 3.12 MG/1
3.12 TABLET ORAL 2 TIMES DAILY WITH MEALS
Status: DISCONTINUED | OUTPATIENT
Start: 2022-10-14 | End: 2022-10-14

## 2022-10-14 RX ORDER — ONDANSETRON 2 MG/ML
4 INJECTION INTRAMUSCULAR; INTRAVENOUS
Status: DISCONTINUED | OUTPATIENT
Start: 2022-10-14 | End: 2022-10-17 | Stop reason: HOSPADM

## 2022-10-14 RX ORDER — INSULIN LISPRO 100 [IU]/ML
INJECTION, SOLUTION INTRAVENOUS; SUBCUTANEOUS
Status: DISCONTINUED | OUTPATIENT
Start: 2022-10-14 | End: 2022-10-17 | Stop reason: HOSPADM

## 2022-10-14 RX ORDER — MAGNESIUM SULFATE 100 %
4 CRYSTALS MISCELLANEOUS AS NEEDED
Status: DISCONTINUED | OUTPATIENT
Start: 2022-10-14 | End: 2022-10-17 | Stop reason: HOSPADM

## 2022-10-14 RX ORDER — POLYETHYLENE GLYCOL 3350 17 G/17G
17 POWDER, FOR SOLUTION ORAL DAILY PRN
Status: DISCONTINUED | OUTPATIENT
Start: 2022-10-14 | End: 2022-10-17 | Stop reason: SDUPTHER

## 2022-10-14 RX ORDER — DULOXETIN HYDROCHLORIDE 60 MG/1
60 CAPSULE, DELAYED RELEASE ORAL DAILY
Status: DISCONTINUED | OUTPATIENT
Start: 2022-10-15 | End: 2022-10-17 | Stop reason: HOSPADM

## 2022-10-14 RX ORDER — POLYETHYLENE GLYCOL 3350 17 G/17G
17 POWDER, FOR SOLUTION ORAL DAILY PRN
Status: DISCONTINUED | OUTPATIENT
Start: 2022-10-14 | End: 2022-10-17 | Stop reason: HOSPADM

## 2022-10-14 RX ORDER — LANOLIN ALCOHOL/MO/W.PET/CERES
500 CREAM (GRAM) TOPICAL DAILY
Status: DISCONTINUED | OUTPATIENT
Start: 2022-10-15 | End: 2022-10-17 | Stop reason: HOSPADM

## 2022-10-14 RX ORDER — CARVEDILOL 12.5 MG/1
12.5 TABLET ORAL 2 TIMES DAILY WITH MEALS
Status: DISCONTINUED | OUTPATIENT
Start: 2022-10-14 | End: 2022-10-17 | Stop reason: HOSPADM

## 2022-10-14 RX ORDER — OLMESARTAN MEDOXOMIL AND HYDROCHLOROTHIAZIDE 40/12.5 40; 12.5 MG/1; MG/1
1 TABLET ORAL
COMMUNITY

## 2022-10-14 RX ORDER — BUSPIRONE HYDROCHLORIDE 5 MG/1
10 TABLET ORAL 3 TIMES DAILY
Status: DISCONTINUED | OUTPATIENT
Start: 2022-10-14 | End: 2022-10-17 | Stop reason: HOSPADM

## 2022-10-14 RX ORDER — HYDRALAZINE HYDROCHLORIDE 20 MG/ML
20 INJECTION INTRAMUSCULAR; INTRAVENOUS
Status: DISCONTINUED | OUTPATIENT
Start: 2022-10-14 | End: 2022-10-17 | Stop reason: HOSPADM

## 2022-10-14 RX ADMIN — SODIUM CHLORIDE, PRESERVATIVE FREE 10 ML: 5 INJECTION INTRAVENOUS at 22:17

## 2022-10-14 RX ADMIN — BUSPIRONE HYDROCHLORIDE 10 MG: 5 TABLET ORAL at 09:54

## 2022-10-14 RX ADMIN — Medication 2 UNITS: at 13:09

## 2022-10-14 RX ADMIN — CARVEDILOL 3.12 MG: 3.12 TABLET, FILM COATED ORAL at 09:55

## 2022-10-14 RX ADMIN — SODIUM CHLORIDE, PRESERVATIVE FREE 10 ML: 5 INJECTION INTRAVENOUS at 13:09

## 2022-10-14 RX ADMIN — ATORVASTATIN CALCIUM 40 MG: 40 TABLET, FILM COATED ORAL at 10:08

## 2022-10-14 RX ADMIN — HYDRALAZINE HYDROCHLORIDE 20 MG: 20 INJECTION INTRAMUSCULAR; INTRAVENOUS at 06:23

## 2022-10-14 RX ADMIN — SODIUM CHLORIDE, PRESERVATIVE FREE 10 ML: 5 INJECTION INTRAVENOUS at 06:18

## 2022-10-14 RX ADMIN — Medication 2 UNITS: at 17:32

## 2022-10-14 RX ADMIN — IPRATROPIUM BROMIDE AND ALBUTEROL SULFATE 3 ML: .5; 3 SOLUTION RESPIRATORY (INHALATION) at 20:57

## 2022-10-14 RX ADMIN — BUSPIRONE HYDROCHLORIDE 10 MG: 5 TABLET ORAL at 17:31

## 2022-10-14 RX ADMIN — TRAZODONE HYDROCHLORIDE 100 MG: 100 TABLET ORAL at 22:17

## 2022-10-14 RX ADMIN — SODIUM CHLORIDE, PRESERVATIVE FREE 10 ML: 5 INJECTION INTRAVENOUS at 06:17

## 2022-10-14 RX ADMIN — HYDROCHLOROTHIAZIDE: 25 TABLET ORAL at 09:54

## 2022-10-14 RX ADMIN — CARVEDILOL 12.5 MG: 12.5 TABLET, FILM COATED ORAL at 17:31

## 2022-10-14 RX ADMIN — BUSPIRONE HYDROCHLORIDE 10 MG: 5 TABLET ORAL at 22:17

## 2022-10-14 NOTE — PROGRESS NOTES
0130 TRANSFER - IN REPORT:    Verbal report received from HOWIE RN (name) on [facility]  being received from HOWIE(unit) for routine progression of care      Report consisted of patients Situation, Background, Assessment and   Recommendations(SBAR). Information from the following report(s) SBAR, Kardex, ED Summary, Intake/Output, MAR, Recent Results, and Cardiac Rhythm NSR w/ BBB  was reviewed with the receiving nurse. Opportunity for questions and clarification was provided. 0430 Assessment completed upon patients arrival to unit and care assumed. Dual skin assessment performed with ROSY Duncan. No impairments noted. 0730 Bedside shift change report given to Jay Rondon (oncoming nurse) by Beatriz Blevins (offgoing nurse). Report included the following information SBAR, Kardex, ED Summary, Intake/Output, MAR, Recent Results, and Cardiac Rhythm NSR . Problem: Falls - Risk of  Goal: *Absence of Falls  Description: Document [place] Fall Risk and appropriate interventions in the flowsheet.   Outcome: Progressing Towards Goal  Note: Fall Risk Interventions:  Mobility Interventions: Assess mobility with egress test, Bed/chair exit alarm, Communicate number of staff needed for ambulation/transfer, OT consult for ADLs, Patient to call before getting OOB, PT Consult for mobility concerns, Strengthening exercises (ROM-active/passive)         Medication Interventions: Bed/chair exit alarm, Evaluate medications/consider consulting pharmacy, Patient to call before getting OOB, Teach patient to arise slowly    Elimination Interventions: Bed/chair exit alarm, Call light in reach, Patient to call for help with toileting needs, Stay With Me (per policy), Toileting schedule/hourly rounds              Problem: Patient Education: Go to Patient Education Activity  Goal: Patient/Family Education  Outcome: Progressing Towards Goal     Problem: General Medical Care Plan  Goal: *Vital signs within specified parameters  Outcome: Progressing Towards Goal  Goal: *Labs within defined limits  Outcome: Progressing Towards Goal  Goal: *Absence of infection signs and symptoms  Outcome: Progressing Towards Goal  Goal: *Optimal pain control at patient's stated goal  Outcome: Progressing Towards Goal  Goal: *Skin integrity maintained  Outcome: Progressing Towards Goal  Goal: *Fluid volume balance  Outcome: Progressing Towards Goal  Goal: *Optimize nutritional status  Outcome: Progressing Towards Goal  Goal: *Anxiety reduced or absent  Outcome: Progressing Towards Goal  Goal: *Progressive mobility and function (eg: ADL's)  Outcome: Progressing Towards Goal     Problem: Patient Education: Go to Patient Education Activity  Goal: Patient/Family Education  Outcome: Progressing Towards Goal

## 2022-10-14 NOTE — PROGRESS NOTES
Transitions of Care Plan  RUR: 9% - low  Admission Dx: Dyspnea  Consults: Cardiology; Therapy  Baseline: independent without DME; supportive friend nearby  Barriers to Discharge: medical  Disposition: home - patient deferred [facility] service set up to another time  Estimated Discharge Date: 10/15/22    Reason for Admission:  Dyspnea                   RUR Score:          9% - low           Plan for utilizing home health:      Confirmed    PCP: First and Last name:  None - referral sent for new set up at [facility]     Name of Practice:    Are you a current patient: Yes/No:    Approximate date of last visit:    Can you participate in a virtual visit with your PCP:                     Current Advanced Directive/Advance Care Plan: Full Code      Healthcare Decision Maker:   Click here to complete [address] including selection of the Healthcare Decision Maker Relationship (ie \"Primary\")           Friend - Adi Avila - p: [phone]                  Transition of Care Plan:            CM spoke with patient regarding patient's initial baseline and disposition plan:    Baseline Assessment:  ADLs: independent without DME  Self-Care: independent without DME  Social Background: resides at address on file alone; spouse recently passed  Pharmacy: [facility] on [address]: Confirmed  HF Patient for [facility]: N/A    Disposition Plan:  Home - patient deferred home health option and would like to think about the decision    CM will continue to follow. Kristyn Moe, MPH  Care Manager [facility]  Available via [employer] or      Care Management Interventions  PCP Verified by CM: Yes (pt wants new PCP)  Mode of Transport at Discharge:  Other (see comment)  Transition of Care Consult (CM Consult): Discharge Planning  MyChart Signup: Yes  Discharge Durable Medical Equipment: No  Health Maintenance Reviewed: Yes  Physical Therapy Consult: Yes  Occupational Therapy Consult: Yes  Speech Therapy Consult: No  Support Systems: Friend/Neighbor  Confirm Follow Up Transport: Self  Discharge Location  Patient Expects to be Discharged to[de-identified] Home

## 2022-10-14 NOTE — PROGRESS NOTES
Physician Progress Note      PATIENT:               Corin Jean Baptiste  CSN #:                  [id number]  :                       1940  ADMIT DATE:       10/13/2022 5:26 PM  [address] [place] DATE:  RESPONDING  PROVIDER #:        Darryle Net MD          QUERY TEXT:    Pt noted to have HTN, requiring tx with Apresoline IV. If possible, please document in progress notes and discharge summary if you are evaluating and/or treating any of the following: The medical record reflects the following:  Risk Factors: med noncompliance, obesity  Clinical Indicators: admitted with increased sob, acutely worse in past week and intermittent orthopnea. Pt admits to med noncompliance. /74 on admission, with subsequent readings of 179/96, 194/87. Treatment: Apresoline IV, Lasix IV X1, I/O, 2 Gm Na diet, Cardiology consult. Hypertensive Urgency: Defined as SBP of >180 OR DBP >120 w/o associated organ damage. S/s may or may not be present, but can include severe headache, SOB, epistaxis, severe anxiety. Tx: adjustment of oral BP medication; IV meds not usually required. Hypertensive Emergency: SBP of >180 OR DBP >120 w/ associated organ damage (CVA, MI, acute CHF, NANI, encephalopathy, pulmonary edema, and unstable angina). Documentation should include specific organ affected. Requires immediate treatment, usually with IV meds. Hypertensive Crisis, unspecified: SBP of > 180 OR DBP > 120 and includes damage to blood vessels, including inflammation, leakage of fluid or blood and can cause stroke, headache, heart failure and eclampsia.   Source:  [employer] Training Guide and Quick Reference Guide    Thank you,  Ailyn Mercado RN  Clinical Documentation  [phone], or via [employer]  Options provided:  -- Hypertensive Crisis  -- Hypertensive Emergency  -- Hypertensive Urgency  -- Other - I will add my own diagnosis  -- Disagree - Not applicable / Not valid  -- Disagree - Clinically unable to determine / Unknown  -- Refer to Clinical Documentation Reviewer    PROVIDER RESPONSE TEXT:    This patient has hypertensive urgency.     Query created by: Hope Barton on 10/14/2022 2:32 PM      Electronically signed by:  Lucinda Nieto MD 10/14/2022 6:58 PM

## 2022-10-14 NOTE — H&P
History & Physical    Primary Care Provider: None  Source of Information: Patient and chart review    History of Presenting Illness:   Herman Damon is a 81 y.o. female with history of hypertension, arthritis, history of MI/CAD s/p PCI., dyslipidemia, type 2 diabetes who presented to hospital with complaints of shortness of breath. Patient reports several months of mild dyspnea which she states has acutely worsened over the last week. She describes severe dyspnea with minimal exertion as well as intermittent episodes of orthopnea. States symptoms were severe enough today to prompt her to seek emergency room care. She admits to medical noncompliance over the last 3 years after her 's death and has been off several of her usual medications. At rest, the patient feels fine and denies any fever, chills, chest or abdominal pain, nausea, vomiting, cough, congestion, recent illness, palpitations, or dysuria. Remarkable vitals on ER Presentations: BP to 182/74  Labs Remarkable for: Potassium 3.1, creatinine 1.19  ER Images: CTA chest was negative for PE. Pulmonary arterial enlargement suggestive of pulmonary arterial hypertension Cardiomegaly and coronary artery calcification  Bilateral adrenal masses, both slightly larger than seen on the 2006 study. Consider elective noncontrast CT to further evaluate. ER treatment: Lasix 20 mg, hydralazine 20 mg     Review of Systems:  A comprehensive review of systems was negative except for that written in the History of Present Illness.      Past Medical History:   Diagnosis Date    Arthritis     Chronic pain     Diabetes ([facility] [address]     Essential hypertension, malignant 4/6/2011    Hypertension     MI, old 2017    Pre-operative cardiovascular examination 4/6/2011    Psychiatric disorder     DEPRESSION/ANXIETY    PUD (peptic ulcer disease)     1970S    Pure hypercholesterolemia 4/6/2011    Type II or unspecified type diabetes mellitus without mention of complication, not stated as uncontrolled 4/6/2011      Past Surgical History:   Procedure Laterality Date    HX DILATION AND CURETTAGE      HX HEENT      EARS AS CHILD    HX SALPINGO-OOPHORECTOMY      HX TONSILLECTOMY      NEUROLOGICAL PROCEDURE UNLISTED      CERV. FUSION    PA ABDOMEN SURGERY PROC UNLISTED      BOWEL OBSTRUCTION X2    PA ABDOMEN SURGERY PROC UNLISTED      GASTRIC BYPASS    PA ABDOMEN SURGERY PROC UNLISTED      LAP SEBASTIÁN    PA ABDOMEN SURGERY PROC UNLISTED      HERNIA      Prior to Admission medications    Medication Sig Start Date End Date Taking? Authorizing Provider   azithromycin (ZITHROMAX) 250 mg tablet Take 250 mg by mouth daily. Yes Other, MD Mihai   simvastatin (ZOCOR) 10 mg tablet Take  by mouth nightly. Provider, Historical   COD LIVER OIL PO Take  by mouth daily. Provider, Historical   ascorbic acid (VITAMIN C) 500 mg tablet Take  by mouth daily. Provider, Historical   ERGOCALCIFEROL, VITAMIN D2, (VITAMIN D PO) Take  by mouth daily. Provider, Historical   busPIRone (BUSPAR) 10 mg tablet Take 10 mg by mouth three (3) times daily. Provider, Historical   exenatide (BYETTA) 10 mcg/0.04 mL PnIj injection by SubCUTAneous route. Provider, Historical   DULOXETINE HCL (CYMBALTA PO) Take 50 mg by mouth daily. 4/6/11   Provider, Historical   metformin (GLUCOPHAGE) 500 mg tablet Take  by mouth two (2) times daily (with meals). Provider, Historical   MULTIVITAMIN PO Take  by mouth daily. 4/6/11   Provider, Historical   propoxyphene napsylate-acetaminophen (DARVOCET-N 100) 100-650 mg per tablet Take 1 Tab by mouth. Every 4 hours as needed    Provider, Historical   trazodone (DESYREL) 100 mg tablet Take  by mouth nightly. Take two tablets at bedtime    Provider, Historical   VITAMIN A PO Take  by mouth daily. 4/6/11   Provider, Historical   CYANOCOBALAMIN, VITAMIN B-12, (VITAMIN B-12 PO) Take  by mouth daily.  4/6/11   Provider, Historical   ASCORBATE CALCIUM (VITAMIN C PO) Take  by mouth daily. 4/6/11   Provider, Historical   ergocalciferol (VITAMIN D) 50,000 unit capsule Take 50,000 Units by mouth. Once a week     Provider, Historical   VITAMIN E ACETATE (VITAMIN E PO) Take  by mouth daily. 4/6/11   Provider, Historical     Allergies   Allergen Reactions    Actonel [Risedronate] Other (comments)     BREATHING DIFFICULTIES AND EDEMA AND HIVES    Penicillin G Other (comments)     Black out      Family History   Problem Relation Age of Onset    Diabetes Mother     Hypertension Mother     Heart Disease Mother     Hypertension Father     Heart Disease Father     Cancer Father     Diabetes Father     Hypertension Sister     Hypertension Brother     Cancer Paternal Grandmother     Cancer Paternal Grandfather     Hypertension Sister     Cancer Sister         SOCIAL HISTORY:  Patient resides:  Independently x   Assisted Living    SNF    With family care       Smoking history:   None x   Former    Chronic      Alcohol history:   None x   Social    Chronic      Ambulates:   Independently x   w/cane    w/walker    w/wc    CODE STATUS:  DNR    Full x   Other      Objective:     Physical Exam:     Visit Vitals  BP (!) 174/82   Pulse 83   Temp 98.9 °F (37.2 °C)   Resp 20   Ht 5' (1.524 m)   Wt 72.1 kg (158 lb 15.2 oz)   SpO2 96%   BMI 31.04 kg/m²      O2 Device: None (Room air)    General:  Alert, cooperative, no distress, appears stated age. Head:  Normocephalic, without obvious abnormality, atraumatic. Eyes:  Conjunctivae/corneas clear. PERRL, EOMs intact. Nose: Nares normal. Septum midline. Mucosa normal.        Neck: Supple, symmetrical, trachea midline, no carotid bruit and no JVD. Lungs:   Clear to auscultation bilaterally. Chest wall:  No tenderness or deformity. Heart:  Regular rate and rhythm, S1, S2 normal, no murmur, click, rub or gallop. Abdomen:   Soft, non-tender. Bowel sounds normal. No masses,  No organomegaly.    Extremities: Extremities normal, atraumatic, no cyanosis or edema. Pulses: 2+ and symmetric all extremities. Skin: Skin color, texture, turgor normal. No rashes or lesions   Neurologic: CNII-XII intact. EKG:  nsr, rbbb  Data Review:     Recent Days:  Recent Labs     10/13/22  1819   WBC 10.6   HGB 12.4   HCT 39.4   *     Recent Labs     10/14/22  0150 10/13/22  1819   NA  --  138   K  --  3.1*   CL  --  101   CO2  --  28   GLU  --  193*   BUN  --  8   CREA  --  1.19*   CA  --  9.1   ALB  --  3.5   ALT  --  21   INR 1.0  --      No results for input(s): PH, PCO2, PO2, HCO3, FIO2 in the last 72 hours. 24 Hour Results:  Recent Results (from the past 24 hour(s))   EKG, 12 LEAD, INITIAL    Collection Time: 10/13/22  5:43 PM   Result Value Ref Range    Ventricular Rate 82 BPM    Atrial Rate 82 BPM    P-R Interval 168 ms    QRS Duration 138 ms    Q-T Interval 424 ms    QTC Calculation (Bezet) 495 ms    Calculated P Axis -13 degrees    Calculated R Axis -60 degrees    Calculated T Axis 1 degrees    Diagnosis       Normal sinus rhythm  Right bundle branch block  Left anterior fascicular block  ** Bifascicular block **  Moderate voltage criteria for LVH, may be normal variant  Abnormal ECG  When compared with ECG of 15-MAR-2010 12:05,  Vent.  rate has increased BY  28 BPM  (RBBB and left anterior fascicular block) is now present     CBC WITH AUTOMATED DIFF    Collection Time: 10/13/22  6:19 PM   Result Value Ref Range    WBC 10.6 3.6 - 11.0 K/uL    RBC 4.72 3.80 - 5.20 M/uL    HGB 12.4 11.5 - 16.0 g/dL    HCT 39.4 35.0 - 47.0 %    MCV 83.5 80.0 - 99.0 FL    MCH 26.3 26.0 - 34.0 PG    MCHC 31.5 30.0 - 36.5 g/dL    RDW 19.2 (H) 11.5 - 14.5 %    PLATELET 448 (H) 972 - 400 K/uL    MPV 9.5 8.9 - 12.9 FL    NRBC 0.0 0  WBC    ABSOLUTE NRBC 0.00 0.00 - 0.01 K/uL    NEUTROPHILS 81 (H) 32 - 75 %    LYMPHOCYTES 9 (L) 12 - 49 %    MONOCYTES 8 5 - 13 %    EOSINOPHILS 1 0 - 7 %    BASOPHILS 1 0 - 1 %    IMMATURE GRANULOCYTES 0 0.0 - 0.5 % ABS. NEUTROPHILS 8.6 (H) 1.8 - 8.0 K/UL    ABS. LYMPHOCYTES 1.0 0.8 - 3.5 K/UL    ABS. MONOCYTES 0.8 0.0 - 1.0 K/UL    ABS. EOSINOPHILS 0.1 0.0 - 0.4 K/UL    ABS. BASOPHILS 0.1 0.0 - 0.1 K/UL    ABS. IMM. GRANS. 0.0 0.00 - 0.04 K/UL    DF AUTOMATED     METABOLIC PANEL, COMPREHENSIVE    Collection Time: 10/13/22  6:19 PM   Result Value Ref Range    Sodium 138 136 - 145 mmol/L    Potassium 3.1 (L) 3.5 - 5.1 mmol/L    Chloride 101 97 - 108 mmol/L    CO2 28 21 - 32 mmol/L    Anion gap 9 5 - 15 mmol/L    Glucose 193 (H) 65 - 100 mg/dL    BUN 8 6 - 20 MG/DL    Creatinine 1.19 (H) 0.55 - 1.02 MG/DL    BUN/Creatinine ratio 7 (L) 12 - 20      eGFR 46 (L) >60 ml/min/1.73m2    Calcium 9.1 8.5 - 10.1 MG/DL    Bilirubin, total 0.4 0.2 - 1.0 MG/DL    ALT (SGPT) 21 12 - 78 U/L    AST (SGOT) 25 15 - 37 U/L    Alk. phosphatase 131 (H) 45 - 117 U/L    Protein, total 8.1 6.4 - 8.2 g/dL    Albumin 3.5 3.5 - 5.0 g/dL    Globulin 4.6 (H) 2.0 - 4.0 g/dL    A-G Ratio 0.8 (L) 1.1 - 2.2     NT-PRO BNP    Collection Time: 10/13/22  6:19 PM   Result Value Ref Range    NT pro- (H) 0 - 450 PG/ML   TROPONIN-HIGH SENSITIVITY    Collection Time: 10/13/22  6:19 PM   Result Value Ref Range    Troponin-High Sensitivity 8 0 - 51 ng/L   PROTHROMBIN TIME + INR    Collection Time: 10/14/22  1:50 AM   Result Value Ref Range    INR 1.0 0.9 - 1.1      Prothrombin time 9.4 9.0 - 11.1 sec   PTT    Collection Time: 10/14/22  1:50 AM   Result Value Ref Range    aPTT 23.3 22.1 - 31.0 sec    aPTT, therapeutic range     58.0 - 77.0 SECS         Imaging:     Assessment:     Ana Patton is a 81 y.o. female with history of hypertension, arthritis, history of MI/CAD s/p PCI., dyslipidemia, type 2 diabetes who is admitted for pulm htn.        Plan:       Dyspnea / Pulm HTN  -No overt clinical signs of decompensated heart failure on exam  -Dyspnea likely secondary to worsening pulmonary hypertension  -Obtain bedside echo  -will consult cardiology for potential catheterization    Hypertensive urgency  -Likely due to medication noncompliance  -Resume Benicar and Coreg  -Hydralazine as needed with parameters    CAD status post PCI  -Resume Coreg and continue Lipitor  -Obtain lipid panel, TSH    NIDDM II  -SSI +Hypoglycemic protocols  -Repeat A1c    Major depressive disorder /generalized anxiety disorder  -Continue home BuSpar and Cymbalta    Obesity  -Counseled on weight loss, dieting and exercise          FEN/GI -  npo  Activity - as tolerated  DVT prophylaxis - scds  GI prophylaxis -  ni  Disposition - home    CODE STATUS:  full code       Signed By: Irene Nunn MD     October 14, 2022

## 2022-10-14 NOTE — CONSULTS
[facility] Cardiology Consultation    Date of Consult:  10/14/22  Date of Admission: 10/13/2022  Primary Cardiologist: Dr. Regina Olson  Physician Requesting consult: Aminata Pagan MD     Chief Complaint / Reason for Consult:   Dyspnea, CAD, pulmonary hypertension    History of Present Illness:  Leandro Kiser is a 81 y.o. female with the below listed medical history who was admitted with dyspnea. She was seen in the [facility] EMERGENCY [facility] ED on 10/6/22 for fatigue and cough. CXR was reportedly normal.  She was felt to have a URI and was prescribed azithromycin to use if her symptoms did not improve in 1 week. She says her symptoms have worsened over the past week and she started to have progressive GUAN. Also reports orthopnea. Denies chest pain or ankle edema. CTA chest was negative for PE. Says she did not take her BP medications for \"about 1 year\" prior to presenting because she didn't feel like taking them and wasn't feeling well. Also states that she has been having a hard time since her   recently. BP on arrival to the ED was 182/74 mmHg, going as high as 202/96 mmHg early this morning. HS troponin was 8. NT pro-. Past Medical History:   Diagnosis Date    Arthritis     Chronic pain     Diabetes ([address]     Essential hypertension, malignant 2011    Hypertension     MI, old 2017    Pre-operative cardiovascular examination 2011    Psychiatric disorder     DEPRESSION/ANXIETY    PUD (peptic ulcer disease)     1970S    Pure hypercholesterolemia 2011    Type II or unspecified type diabetes mellitus without mention of complication, not stated as uncontrolled 2011       Prior to Admission medications    Medication Sig Start Date End Date Taking? Authorizing Provider   azithromycin (ZITHROMAX) 250 mg tablet Take 250 mg by mouth daily. Yes Other, MD Mihai   atorvastatin (LIPITOR) 40 mg tablet Take 40 mg by mouth daily.  22   Provider, Historical   carvediloL (COREG) 3.125 mg tablet 1 tab(s)    Provider, Historical   dilTIAZem ER (TIAZAC) 240 mg capsule Take 240 mg by mouth daily. Provider, Historical   olmesartan-hydroCHLOROthiazide (Benicar HCT) 40-12.5 mg per tablet Take 1 Tablet by mouth. Provider, Historical   simvastatin (ZOCOR) 10 mg tablet Take  by mouth nightly. Provider, Historical   COD LIVER OIL PO Take  by mouth daily. Provider, Historical   ascorbic acid (VITAMIN C) 500 mg tablet Take  by mouth daily. Provider, Historical   ERGOCALCIFEROL, VITAMIN D2, (VITAMIN D PO) Take  by mouth daily. Provider, Historical   busPIRone (BUSPAR) 10 mg tablet Take 10 mg by mouth three (3) times daily. Provider, Historical   exenatide (BYETTA) 10 mcg/0.04 mL PnIj injection by SubCUTAneous route. Provider, Historical   DULOXETINE HCL (CYMBALTA PO) Take 50 mg by mouth daily. 4/6/11   Provider, Historical   metformin (GLUCOPHAGE) 500 mg tablet Take  by mouth two (2) times daily (with meals). Provider, Historical   MULTIVITAMIN PO Take  by mouth daily. 4/6/11   Provider, Historical   propoxyphene napsylate-acetaminophen (DARVOCET-N 100) 100-650 mg per tablet Take 1 Tab by mouth. Every 4 hours as needed    Provider, Historical   trazodone (DESYREL) 100 mg tablet Take  by mouth nightly. Take two tablets at bedtime    Provider, Historical   VITAMIN A PO Take  by mouth daily. 4/6/11   Provider, Historical   CYANOCOBALAMIN, VITAMIN B-12, (VITAMIN B-12 PO) Take  by mouth daily. 4/6/11   Provider, Historical   ASCORBATE CALCIUM (VITAMIN C PO) Take  by mouth daily. 4/6/11   Provider, Historical   ergocalciferol (VITAMIN D) 50,000 unit capsule Take 50,000 Units by mouth. Once a week     Provider, Historical   VITAMIN E ACETATE (VITAMIN E PO) Take  by mouth daily. 4/6/11   Provider, Historical       Current Facility-Administered Medications   Medication Dose Route Frequency    . PHARMACY TO SUBSTITUTE PER PROTOCOL (Reordered from: ASCORBATE CALCIUM (VITAMIN C PO))    Per Protocol    busPIRone (BUSPAR) tablet 10 mg  10 mg Oral TID    . PHARMACY TO SUBSTITUTE PER PROTOCOL (Reordered from: CYANOCOBALAMIN, VITAMIN B-12, (VITAMIN B-12 PO))    Per Protocol    . PHARMACY TO SUBSTITUTE PER PROTOCOL (Reordered from: DULOXETINE HCL (CYMBALTA PO))    Per Protocol    sodium chloride (NS) flush 5-40 mL  5-40 mL IntraVENous Q8H    sodium chloride (NS) flush 5-40 mL  5-40 mL IntraVENous PRN    acetaminophen (TYLENOL) tablet 650 mg  650 mg Oral Q6H PRN    Or    acetaminophen (TYLENOL) suppository 650 mg  650 mg Rectal Q6H PRN    polyethylene glycol (MIRALAX) packet 17 g  17 g Oral DAILY PRN    ondansetron (ZOFRAN ODT) tablet 4 mg  4 mg Oral Q8H PRN    Or    ondansetron (ZOFRAN) injection 4 mg  4 mg IntraVENous Q6H PRN    hydrALAZINE (APRESOLINE) 20 mg/mL injection 20 mg  20 mg IntraVENous Q6H PRN    atorvastatin (LIPITOR) tablet 40 mg  40 mg Oral DAILY    traZODone (DESYREL) tablet 100 mg  100 mg Oral QHS    glucose chewable tablet 16 g  4 Tablet Oral PRN    glucagon (GLUCAGEN) injection 1 mg  1 mg IntraMUSCular PRN    sodium chloride (NS) flush 5-40 mL  5-40 mL IntraVENous Q8H    sodium chloride (NS) flush 5-40 mL  5-40 mL IntraVENous PRN    polyethylene glycol (MIRALAX) packet 17 g  17 g Oral DAILY PRN    dextrose 10 % infusion 0-250 mL  0-250 mL IntraVENous PRN    insulin lispro (HUMALOG) injection   SubCUTAneous AC&HS    carvediloL (COREG) tablet 3.125 mg  3.125 mg Oral BID WITH MEALS    losartan/hydroCHLOROthiazide (HYZAAR) 50/12.5 mg   Oral DAILY       Family History   Problem Relation Age of Onset    Diabetes Mother     Hypertension Mother     Heart Disease Mother     Hypertension Father     Heart Disease Father     Cancer Father     Diabetes Father     Hypertension Sister     Hypertension Brother     Cancer Paternal Grandmother     Cancer Paternal Grandfather     Hypertension Sister     Cancer Sister        Social History     Socioeconomic History    Marital status:      Spouse name: Not on file    Number of children: Not on file    Years of education: Not on file    Highest education level: Not on file   Occupational History    Not on file   Tobacco Use    Smoking status: Never    Smokeless tobacco: Never   Substance and Sexual Activity    Alcohol use: Yes     Comment: RARE    Drug use: No    Sexual activity: Never   Other Topics Concern    Not on file   Social History Narrative    ** Merged History Encounter **          Social Determinants of Health     Financial Resource Strain: Not on file   Food Insecurity: Not on file   Transportation Needs: Not on file   Physical Activity: Not on file   Stress: Not on file   Social Connections: Not on file   Intimate Partner Violence: Not on file   Housing Stability: Not on file       Review of Systems   All other systems reviewed and are negative. Visit Vitals  BP (!) 154/106 (BP Patient Position: Sitting; Other (Comment)) Comment (BP Patient Position): post activity   Pulse 81   Temp 97.8 °F (36.6 °C)   Resp 28   Ht 5' (1.524 m)   Wt 72.1 kg (158 lb 15.2 oz)   SpO2 94%   BMI 31.04 kg/m²         Intake/Output Summary (Last 24 hours) at 10/14/2022 1113  Last data filed at 10/14/2022 0730  Gross per 24 hour   Intake 0 ml   Output 1600 ml   Net -1600 ml        Physical Exam  GEN: NAD, appears stated age  [de-identified]: EOMI  NECK: Normal JVP   CV: RRR, normal S1 and S2, no M/R/G  LUNGS: CTAB, no W/R/R  ABD: NABS, soft, NT/ND  EXT: No edema, 2+ and symmetrical radial pulses b/l  PSYCH: Mood and affect normal  NEURO: Alert, MAEW, face symmetrical, speech intact    Lab Review:  BMP:   Lab Results   Component Value Date/Time     10/14/2022 04:56 AM    K 3.7 10/14/2022 04:56 AM     10/14/2022 04:56 AM    CO2 26 10/14/2022 04:56 AM    AGAP 7 10/14/2022 04:56 AM     (H) 10/14/2022 04:56 AM    BUN 11 10/14/2022 04:56 AM    CREA 0.99 10/14/2022 04:56 AM        CBC:  Lab Results   Component Value Date/Time    WBC 11.0 10/14/2022 04:56 AM    HGB 11.8 10/14/2022 04:56 AM    HCT 36.3 10/14/2022 04:56 AM    PLATELET 316 (H)  04:56 AM    MCV 81.2 10/14/2022 04:56 AM       All Cardiac Markers in the last 24 hours:    Lab Results   Component Value Date/Time    BNPNT 451 (H) 10/13/2022 06:19 PM       Lab Results   Component Value Date/Time    Cholesterol, total 142 10/14/2022 10:06 AM    HDL Cholesterol 74 10/14/2022 10:06 AM    LDL, calculated 55.4 10/14/2022 10:06 AM    VLDL, calculated 12.6 10/14/2022 10:06 AM    Triglyceride 63 10/14/2022 10:06 AM    CHOL/HDL Ratio 1.9 10/14/2022 10:06 AM        Data Review:  ECG tracing personally reviewed:   NSR with bifascicular block, LVH, no ischemic changes  Prior ECGs have shown bifascicular block (back to )    Echocardiogram:  21 VCS: EF 55-60%, mild MR, mild TR with estimated RVSP 41 mmHg. Other cardiac testin21 cPET stress test VCS:  Rest EF 63%, Stress EF 72%, normal perfusion    Other imaging:  CTA chest 10/13/22: IMPRESSION     No evidence for pulmonary embolism. Pulmonary arterial enlargement suggestive of pulmonary arterial hypertension  Cardiomegaly and coronary artery calcification  Bilateral adrenal masses, both slightly larger than seen on the 2006 study.   Consider elective noncontrast CT to further evaluate    Assessment:    Dyspnea  Probably multifactorial related to hypertensive urgency, obesity, deconditioning, possible URI  Hypertensive urgency  CAD c/b prior NSTEMI s/p LAD PCI 17  ICM with EF recovered to normal, NYHA class II-III currently  HTN  Dyslipidemia  DM2 with other circulatory complications  Morbid obesity s/p gastric bypass  Bilateral adrenal nodules  Recommend dedicated evaluation of these and consider endocrinology consultation    Recommendations / Plan:  - TTE pending  - Consider endocrinology consult for bilateral adrenal nodules  - Continue losartan/HCTZ  - Increase carvedilol to 12.5 mg BID  - Echo 2021 suggested only mild pulmonary hypertension  - Cardiac stress test 9/2021 was non-ischemic; HS troponin is normal currently  - Will ask [facility] on call this weekend, Dr. Yamilka Foss, to see  - Follow-up after discharge with Dr. Anay Schmitt    Thank you for the opportunity to participate in the care of Galina Chavez and please do not hesitate to contact us should you have any questions. Tamera Pickett, [address] [facility]  10/14/22

## 2022-10-14 NOTE — PROGRESS NOTES
Brief progress note:     Evaluated patient at bedside. Has a productive cough and minimal increase work of breathing. No CP. Has not been taking her medications. VSS. No oxygen requirement.      Plan:   - Follow-up ECHO  - BP management per cardiology  - Cardiology consulted, appreciate recommendations  - Consider pulm consult for ?pulm HTN   - Given work of breathing, start duonebs and RT eval   - Continue home meds   - PAC and PRA for adrenal masses, consider endo consult

## 2022-10-14 NOTE — PROGRESS NOTES
0730: Bedside shift change report given to Jay Rondon (oncoming nurse) by Mika Hill (offgoing nurse). Report included the following information SBAR, Kardex, ED Summary, Intake/Output, MAR, and Recent Results.

## 2022-10-14 NOTE — PROGRESS NOTES
Problem: Mobility Impaired (Adult and Pediatric)  Goal: *Acute Goals and Plan of Care (Insert Text)  Description: FUNCTIONAL STATUS PRIOR TO ADMISSION: Patient was independent and active without use of DME.    HOME SUPPORT PRIOR TO ADMISSION: The patient lived alone with no local support. Physical Therapy Goals  Initiated 10/14/2022  1. Patient will move from supine to sit and sit to supine  in bed with independence within 7 day(s). 2.  Patient will transfer from bed to chair and chair to bed with independence using the least restrictive device within 7 day(s). 3.  Patient will perform sit to stand with independence within 7 day(s). 4.  Patient will ambulate with independence for 150 feet with the least restrictive device within 7 day(s). 5.  Patient will ascend/descend 15 stairs with bilateral handrail(s) with independence within 7 day(s). Outcome: Progressing Towards Goal   PHYSICAL THERAPY EVALUATION  Patient: Martha Whiting (48 y.o. female)  Date: 10/14/2022  Primary Diagnosis: Acute on chronic congestive heart failure, unspecified heart failure type [facility]) [I50.9]       Precautions:   Fall      ASSESSMENT  Based on the objective data described below, the patient presents with decreased activity tolerance, generalized weakness, GUAN, and moderate risk for falls. Patient oriented x 4, talkative and appears somewhat anxious, however follows all commands and participatory in all therapeutic interventions. Patient with HTN with no immediate orthostatic hypotension noted, however by end of ambulatoin/ activity SBP noted to have dropped, though MAP stable (see below). Patient overall CGA-SBA for all mobility, ambulating 65 feet x 2 and completing 4 steps with mild trunk sway though no overt LOB. SPO2 stable on RA throughout activity however patient noted to have increased WOB at times, though receptive to cues for PLB and slowed RR. Patient returned to room and left with all needs in reach.  Anticipate patient able to return home with HHPT within 1-2 more PT treatment sessions. Current Level of Function Impacting Discharge (mobility/balance): ambulates 65 feet x 2 with CGA     10/14/22 0850 10/14/22 0855 10/14/22 0900 10/14/22 0920   Vital Signs    BP (!) 170/70 (!) 178/89 (!) 161/92 (!) 154/106   MAP (Calculated) 103 119 115 122   BP Patient Position Supine Sitting Standing Sitting; post activity         Functional Outcome Measure: The patient scored Total Score: 23/28 on the Tinetti outcome measure which is indicative of moderate fall risk. Other factors to consider for discharge: lives alone     Patient will benefit from skilled therapy intervention to address the above noted impairments. PLAN :  Recommendations and Planned Interventions: bed mobility training, transfer training, gait training, therapeutic exercises, neuromuscular re-education, patient and family training/education, and therapeutic activities      Frequency/Duration: Patient will be followed by physical therapy:  3 times a week to address goals. Recommendation for discharge: (in order for the patient to meet his/her long term goals)  Physical therapy at least 2 days/week in the home     This discharge recommendation:  A follow-up discussion with the attending provider and/or case management is planned    IF patient discharges home will need the following DME: none         SUBJECTIVE:   Patient stated I miss my , I was supposed to go before him.     OBJECTIVE DATA SUMMARY:   HISTORY:    Past Medical History:   Diagnosis Date    Arthritis     Chronic pain     Diabetes ([facility] [address]     Essential hypertension, malignant 4/6/2011    Hypertension     MI, old 2017    Pre-operative cardiovascular examination 4/6/2011    Psychiatric disorder     DEPRESSION/ANXIETY    PUD (peptic ulcer disease)     1970S    Pure hypercholesterolemia 4/6/2011    Type II or unspecified type diabetes mellitus without mention of complication, not stated as uncontrolled 4/6/2011     Past Surgical History:   Procedure Laterality Date    HX DILATION AND CURETTAGE      HX HEENT      EARS AS CHILD    HX SALPINGO-OOPHORECTOMY      HX TONSILLECTOMY      NEUROLOGICAL PROCEDURE UNLISTED      CERV. FUSION    IL ABDOMEN SURGERY PROC UNLISTED      BOWEL OBSTRUCTION X2    IL ABDOMEN SURGERY PROC UNLISTED      GASTRIC BYPASS    IL ABDOMEN SURGERY PROC UNLISTED      LAP SEBASTIÁN    IL ABDOMEN SURGERY PROC UNLISTED      HERNIA        Personal factors and/or comorbidities impacting plan of care: CHF    Home Situation  Home Environment: Private residence  # Steps to Enter: 1  Rails to Enter: No  One/Two Story Residence: Two story  # of Interior Steps: 13  Interior Rails: Both  Living Alone: Yes  Patient Expects to be Discharged to[de-identified] Home with home health  Current DME Used/Available at Home: None  Tub or Shower Type: Shower    EXAMINATION/PRESENTATION/DECISION MAKING:   Critical Behavior:  Neurologic State: Alert  Orientation Level: Oriented X4  Cognition: Follows commands  Safety/Judgement: Fall prevention, Home safety  Hearing:     Skin:  intact  Edema: none noted  Range Of Motion:  AROM: Within functional limits           PROM: Within functional limits           Strength:    Strength: Generally decreased, functional                    Tone & Sensation:   Tone: Normal              Sensation: Intact               Coordination:  Coordination: Within functional limits  Vision:      Functional Mobility:  Bed Mobility:  Rolling: Stand-by assistance  Supine to Sit: Stand-by assistance        Transfers:  Sit to Stand: Contact guard assistance  Stand to Sit: Contact guard assistance        Bed to Chair: Contact guard assistance              Balance:   Sitting: Intact; Without support  Standing: Impaired; Without support  Standing - Static: Good  Standing - Dynamic : Good;Fair  Ambulation/Gait Training:  Distance (ft): 65 Feet (ft) (65 feet x 2)  Assistive Device: Gait belt  Ambulation - Level of Assistance: Contact guard assistance        Gait Abnormalities: Altered arm swing;Decreased step clearance;Trunk sway increased        Base of Support: Widened     Speed/Laura: Pace decreased (<100 feet/min)  Step Length: Right shortened;Left shortened                     Stairs:  Number of Stairs Trained: 4  Stairs - Level of Assistance: Contact guard assistance   Rail Use: Both      Functional Measure:  Tinetti test:    Sitting Balance: 1  Arises: 1  Attempts to Rise: 2  Immediate Standing Balance: 2  Standing Balance: 2  Nudged: 1  Eyes Closed: 1  Turn 360 Degrees - Continuous/Discontinuous: 1  Turn 360 Degrees - Steady/Unsteady: 0  Sitting Down: 1  Balance Score: 12 Balance total score  Indication of Gait: 1  R Step Length/Height: 1  L Step Length/Height: 1  R Foot Clearance: 1  L Foot Clearance: 1  Step Symmetry: 1  Step Continuity: 1  Path: 2  Trunk: 1  Walking Time: 1  Gait Score: 11 Gait total score  Total Score: 23/28 Overall total score         Tinetti Tool Score Risk of Falls  <19 = High Fall Risk  19-24 = Moderate Fall Risk  25-28 = Low Fall Risk  Tinetti ME. Performance-Oriented Assessment of Mobility Problems in Elderly Patients. [facility]; [id number].  (Scoring Description: PT Bulletin Feb. 10, 1993)    Older adults: Yamilka Mckeon et al, 2009; n = 1000 [facility] elderly evaluated with ABC, ELICEO, ADL, and IADL)  · Mean ELICEO score for males aged 69-68 years = 26.21(3.40)  · Mean ELICEO score for females age 69-68 years = 25.16(4.30)  · Mean ELICEO score for males over 80 years = 23.29(6.02)  · Mean ELICEO score for females over 80 years = 17.20(8.32)        Physical Therapy Evaluation Charge Determination   History Examination Presentation Decision-Making   MEDIUM  Complexity : 1-2 comorbidities / personal factors will impact the outcome/ POC  LOW Complexity : 1-2 Standardized tests and measures addressing body structure, function, activity limitation and / or participation in recreation  LOW Complexity : Stable, uncomplicated  Other outcome measures tinetti  MEDIUM      Based on the above components, the patient evaluation is determined to be of the following complexity level: LOW     Pain Ratin/10    Activity Tolerance:   Good, SpO2 stable on RA, and requires rest breaks      After treatment patient left in no apparent distress:   Sitting in chair, Call bell within reach, and Bed / chair alarm activated    COMMUNICATION/EDUCATION:   The patients plan of care was discussed with: Occupational therapist and Registered nurse. Fall prevention education was provided and the patient/caregiver indicated understanding.     Thank you for this referral.  Yasmeen Kimble, PT   Time Calculation: 35 mins

## 2022-10-14 NOTE — PROGRESS NOTES
OCCUPATIONAL THERAPY EVALUATION/DISCHARGE  Patient: Nisreen Valerio (22 y.o. female)  Date: 10/14/2022  Primary Diagnosis: Acute on chronic congestive heart failure, unspecified heart failure type [facility]) [I50.9]       Precautions:   Fall    ASSESSMENT  Based on the objective data described below, the patient presents at her functional baseline of independent limited only at this time by mildly impaired activity tolerance s/p admission for SOB. Pt received supine and agreeable to participation in therapy. VSS throughout session on RA, BP elevated however RN aware and addressing. She demonstrated functional transfers, bed mobility, and ADL tasks with supervision for safety within hospital environment. Pt returned to bedside chair, left with all needs met and RN notified of session. As pt is at her functional baseline acute OT will sign off and recommend d/c home. No skilled follow up OT needs indicated at this time. Current Level of Function (ADLs/self-care): supervision to independent     Functional Outcome Measure: The patient scored 85/100 on the Barthel Index outcome measure which is indicative of independence in basic self care. Other factors to consider for discharge: PLOF, lives alone      PLAN :    Recommendation for discharge: (in order for the patient to meet his/her long term goals)  No skilled occupational therapy/ follow up rehabilitation needs identified at this time. This discharge recommendation:  Has been made in collaboration with the attending provider and/or case management    IF patient discharges home will need the following DME: none       SUBJECTIVE:   Patient stated I lost my  six months ago.     OBJECTIVE DATA SUMMARY:   HISTORY:   Past Medical History:   Diagnosis Date    Arthritis     Chronic pain     Diabetes ([facility] [address]     Essential hypertension, malignant 4/6/2011    Hypertension     MI, old 2017    Pre-operative cardiovascular examination 4/6/2011    Psychiatric disorder DEPRESSION/ANXIETY    PUD (peptic ulcer disease)     1970S    Pure hypercholesterolemia 4/6/2011    Type II or unspecified type diabetes mellitus without mention of complication, not stated as uncontrolled 4/6/2011     Past Surgical History:   Procedure Laterality Date    HX DILATION AND CURETTAGE      HX HEENT      EARS AS CHILD    HX SALPINGO-OOPHORECTOMY      HX TONSILLECTOMY      NEUROLOGICAL PROCEDURE UNLISTED      CERV. FUSION    CA ABDOMEN SURGERY PROC UNLISTED      BOWEL OBSTRUCTION X2    CA ABDOMEN SURGERY PROC UNLISTED      GASTRIC BYPASS    CA ABDOMEN SURGERY PROC UNLISTED      LAP SEBASTIÁN    CA ABDOMEN SURGERY PROC UNLISTED      HERNIA        Prior Level of Function/Environment/Context:   Expanded or extensive additional review of patient history:   Home Situation  Home Environment: Private residence  # Steps to Enter: 1  Rails to Enter: No  One/Two Story Residence: Two story  # of Interior Steps: 13  Interior Rails: Both  Living Alone: Yes  Patient Expects to be Discharged to[de-identified] Home with home health  Current DME Used/Available at Home: None  Tub or Shower Type: Shower    Hand dominance: right     EXAMINATION OF PERFORMANCE DEFICITS:  Cognitive/Behavioral Status:  Neurologic State: Alert  Orientation Level: Oriented X4  Cognition: Follows commands  Perception: Appears intact  Perseveration: No perseveration noted  Safety/Judgement: Fall prevention;Home safety    Skin: visually intact     Edema: none noted     Hearing:   Auditory  Auditory Impairment: None    Vision/Perceptual:                           Acuity: Within Defined Limits;Able to read clock/calendar on wall without difficulty         Range of Motion:    AROM: Within functional limits  PROM: Within functional limits                      Strength:    Strength: Generally decreased, functional                Coordination:  Coordination: Within functional limits            Tone & Sensation:    Tone: Normal  Sensation: Intact Balance:  Sitting: Intact; Without support  Standing: Impaired; Without support  Standing - Static: Good  Standing - Dynamic : Good;Fair    Functional Mobility and Transfers for ADLs:  Bed Mobility:  Rolling: Supervision  Supine to Sit: Supervision  Scooting: Supervision (to EOB)    Transfers:  Sit to Stand: Supervision  Stand to Sit: Supervision  Bed to Chair: Contact guard assistance  Bathroom Mobility: Supervision/set up  Toilet Transfer : Supervision    ADL Assessment:  Feeding: Independent    Oral Facial Hygiene/Grooming: Supervision (standing at sink)    Bathing: Supervision (inferred)         Upper Body Dressing: Independent    Lower Body Dressing: Independent    Toileting: Supervision (for transfer)                ADL Intervention and task modifications:       Grooming  Grooming Assistance: Supervision  Position Performed: Standing  Washing Hands: Supervision                   Upper [address]: Independent    Lower Body Dressing Assistance  Underpants: Independent  Socks: Independent    Toileting  Toileting Assistance: Supervision  Bladder Hygiene: Supervision  Bowel Hygiene: Supervision  Clothing Management: Supervision    Cognitive Retraining  Safety/Judgement: Fall prevention;Home safety      Functional Measure:    Barthel Index:  Bathin  Bladder: 10  Bowels: 10  Groomin  Dressing: 10  Feeding: 10  Mobility: 10  Stairs: 5  Toilet Use: 10  Transfer (Bed to Chair and Back): 10  Total: 85/100      The Barthel ADL Index: Guidelines  1. The index should be used as a record of what a patient does, not as a record of what a patient could do. 2. The main aim is to establish degree of independence from any help, physical or verbal, however minor and for whatever reason. 3. The need for supervision renders the patient not independent. 4. A patient's performance should be established using the best available evidence.  Asking the patient, friends/relatives and nurses are the usual sources, but direct observation and common sense are also important. However direct testing is not needed. 5. Usually the patient's performance over the preceding 24-48 hours is important, but occasionally longer periods will be relevant. 6. Middle categories imply that the patient supplies over 50 per cent of the effort. 7. Use of aids to be independent is allowed. Score Interpretation (from [address])    Independent   60-79 Minimally independent   40-59 Partially dependent   20-39 Very dependent   <20 Totally dependent     -Joseph Beaver., Barthel, D.W. (1965). Functional evaluation: the Barthel Index. [address] ([address] (1997). The Barthel activities of daily living index: self-reporting versus actual performance in the old (> or = 75 years). Journal of [address] [place] [address] 45(7), [address] [facility], VIRGILIO, Travis Yeung., Yesy Mcdonaldsh. (1999). Measuring the change in disability after inpatient rehabilitation; comparison of the responsiveness of the Barthel Index and Functional [place] Measure. Journal of Neurology, Neurosurgery, and Psychiatry, 66(4), 177-041. Selina Johnson, N.J.A, KHALIDA Hoskins.PRISCILLA, & Kim Brantley, M.A. (2004) Assessment of post-stroke quality of life in cost-effectiveness studies: The usefulness of the Barthel Index and the EuroQoL-5D. Quality of Life Research, 15, 430-21         Occupational Therapy Evaluation Charge Determination   History Examination Decision-Making   LOW Complexity : Brief history review  LOW Complexity : 1-3 performance deficits relating to physical, cognitive , or psychosocial skils that result in activity limitations and / or participation restrictions  MEDIUM Complexity : Patient may present with comorbidities that affect occupational performnce.  Miniml to moderate modification of tasks or assistance (eg, physical or verbal ) with assesment(s) is necessary to enable patient to complete evaluation       Based on the above components, the patient evaluation is determined to be of the following complexity level: LOW   Pain Rating:  None reported     Activity Tolerance:   Good    After treatment patient left in no apparent distress:    Sitting in chair, Call bell within reach, and Bed / chair alarm activated    COMMUNICATION/EDUCATION:   The patients plan of care was discussed with: Physical therapist and Registered nurse.      Thank you for this referral.  DESIREE Rodriguez, OTR/L  Time Calculation: 35 mins

## 2022-10-14 NOTE — PROGRESS NOTES
Several attempts have been made to secure New Patient appointment. Earliest available time is Mid November.  Referred back to CM

## 2022-10-15 ENCOUNTER — APPOINTMENT (OUTPATIENT)
Dept: GENERAL RADIOLOGY | Age: 82
DRG: 316 | End: 2022-10-15
Attending: STUDENT IN AN ORGANIZED HEALTH CARE EDUCATION/TRAINING PROGRAM
Payer: MEDICARE

## 2022-10-15 LAB
ANION GAP SERPL CALC-SCNC: 4 MMOL/L (ref 5–15)
BUN SERPL-MCNC: 19 MG/DL (ref 6–20)
BUN/CREAT SERPL: 15 (ref 12–20)
CALCIUM SERPL-MCNC: 8.8 MG/DL (ref 8.5–10.1)
CHLORIDE SERPL-SCNC: 103 MMOL/L (ref 97–108)
CO2 SERPL-SCNC: 28 MMOL/L (ref 21–32)
COVID-19 RAPID TEST, COVR: NOT DETECTED
CREAT SERPL-MCNC: 1.27 MG/DL (ref 0.55–1.02)
ERYTHROCYTE [DISTWIDTH] IN BLOOD BY AUTOMATED COUNT: 19.5 % (ref 11.5–14.5)
GLUCOSE BLD STRIP.AUTO-MCNC: 124 MG/DL (ref 65–117)
GLUCOSE BLD STRIP.AUTO-MCNC: 188 MG/DL (ref 65–117)
GLUCOSE BLD STRIP.AUTO-MCNC: 215 MG/DL (ref 65–117)
GLUCOSE BLD STRIP.AUTO-MCNC: 222 MG/DL (ref 65–117)
GLUCOSE SERPL-MCNC: 198 MG/DL (ref 65–100)
HCT VFR BLD AUTO: 35.3 % (ref 35–47)
HGB BLD-MCNC: 11.8 G/DL (ref 11.5–16)
MCH RBC QN AUTO: 27.4 PG (ref 26–34)
MCHC RBC AUTO-ENTMCNC: 33.4 G/DL (ref 30–36.5)
MCV RBC AUTO: 81.9 FL (ref 80–99)
NRBC # BLD: 0 K/UL (ref 0–0.01)
NRBC BLD-RTO: 0 PER 100 WBC
PLATELET # BLD AUTO: 445 K/UL (ref 150–400)
PMV BLD AUTO: 9.1 FL (ref 8.9–12.9)
POTASSIUM SERPL-SCNC: 3.9 MMOL/L (ref 3.5–5.1)
RBC # BLD AUTO: 4.31 M/UL (ref 3.8–5.2)
SERVICE CMNT-IMP: ABNORMAL
SODIUM SERPL-SCNC: 135 MMOL/L (ref 136–145)
SOURCE, COVRS: NORMAL
WBC # BLD AUTO: 9.9 K/UL (ref 3.6–11)

## 2022-10-15 PROCEDURE — 74011000250 HC RX REV CODE- 250: Performed by: STUDENT IN AN ORGANIZED HEALTH CARE EDUCATION/TRAINING PROGRAM

## 2022-10-15 PROCEDURE — 74011636637 HC RX REV CODE- 636/637: Performed by: INTERNAL MEDICINE

## 2022-10-15 PROCEDURE — 74011250637 HC RX REV CODE- 250/637: Performed by: STUDENT IN AN ORGANIZED HEALTH CARE EDUCATION/TRAINING PROGRAM

## 2022-10-15 PROCEDURE — 87635 SARS-COV-2 COVID-19 AMP PRB: CPT

## 2022-10-15 PROCEDURE — 74011250637 HC RX REV CODE- 250/637: Performed by: INTERNAL MEDICINE

## 2022-10-15 PROCEDURE — 65270000046 HC RM TELEMETRY

## 2022-10-15 PROCEDURE — 74011250636 HC RX REV CODE- 250/636: Performed by: STUDENT IN AN ORGANIZED HEALTH CARE EDUCATION/TRAINING PROGRAM

## 2022-10-15 PROCEDURE — 74011250637 HC RX REV CODE- 250/637: Performed by: FAMILY MEDICINE

## 2022-10-15 PROCEDURE — 85027 COMPLETE CBC AUTOMATED: CPT

## 2022-10-15 PROCEDURE — 94640 AIRWAY INHALATION TREATMENT: CPT

## 2022-10-15 PROCEDURE — 71045 X-RAY EXAM CHEST 1 VIEW: CPT

## 2022-10-15 PROCEDURE — 82962 GLUCOSE BLOOD TEST: CPT

## 2022-10-15 PROCEDURE — 80048 BASIC METABOLIC PNL TOTAL CA: CPT

## 2022-10-15 PROCEDURE — 74011000250 HC RX REV CODE- 250: Performed by: FAMILY MEDICINE

## 2022-10-15 PROCEDURE — 36415 COLL VENOUS BLD VENIPUNCTURE: CPT

## 2022-10-15 PROCEDURE — 74011636637 HC RX REV CODE- 636/637: Performed by: STUDENT IN AN ORGANIZED HEALTH CARE EDUCATION/TRAINING PROGRAM

## 2022-10-15 RX ORDER — PREDNISONE 10 MG/1
40 TABLET ORAL
Status: DISCONTINUED | OUTPATIENT
Start: 2022-10-15 | End: 2022-10-17 | Stop reason: HOSPADM

## 2022-10-15 RX ORDER — FUROSEMIDE 10 MG/ML
20 INJECTION INTRAMUSCULAR; INTRAVENOUS ONCE
Status: COMPLETED | OUTPATIENT
Start: 2022-10-15 | End: 2022-10-15

## 2022-10-15 RX ADMIN — IPRATROPIUM BROMIDE AND ALBUTEROL SULFATE 3 ML: .5; 3 SOLUTION RESPIRATORY (INHALATION) at 16:49

## 2022-10-15 RX ADMIN — DULOXETINE HYDROCHLORIDE 60 MG: 60 CAPSULE, DELAYED RELEASE ORAL at 16:08

## 2022-10-15 RX ADMIN — Medication 3 UNITS: at 18:53

## 2022-10-15 RX ADMIN — BUSPIRONE HYDROCHLORIDE 10 MG: 5 TABLET ORAL at 08:41

## 2022-10-15 RX ADMIN — PREDNISONE 40 MG: 20 TABLET ORAL at 11:30

## 2022-10-15 RX ADMIN — BUSPIRONE HYDROCHLORIDE 10 MG: 5 TABLET ORAL at 15:59

## 2022-10-15 RX ADMIN — HYDROCHLOROTHIAZIDE: 25 TABLET ORAL at 08:41

## 2022-10-15 RX ADMIN — IPRATROPIUM BROMIDE AND ALBUTEROL SULFATE 3 ML: .5; 3 SOLUTION RESPIRATORY (INHALATION) at 07:56

## 2022-10-15 RX ADMIN — OXYCODONE HYDROCHLORIDE AND ACETAMINOPHEN 500 MG: 500 TABLET ORAL at 09:00

## 2022-10-15 RX ADMIN — FUROSEMIDE 20 MG: 10 INJECTION, SOLUTION INTRAVENOUS at 15:59

## 2022-10-15 RX ADMIN — CARVEDILOL 12.5 MG: 12.5 TABLET, FILM COATED ORAL at 16:08

## 2022-10-15 RX ADMIN — SODIUM CHLORIDE, PRESERVATIVE FREE 10 ML: 5 INJECTION INTRAVENOUS at 13:04

## 2022-10-15 RX ADMIN — IPRATROPIUM BROMIDE AND ALBUTEROL SULFATE 3 ML: .5; 3 SOLUTION RESPIRATORY (INHALATION) at 12:06

## 2022-10-15 RX ADMIN — SODIUM CHLORIDE, PRESERVATIVE FREE 10 ML: 5 INJECTION INTRAVENOUS at 07:06

## 2022-10-15 RX ADMIN — Medication 2 UNITS: at 13:04

## 2022-10-15 RX ADMIN — CYANOCOBALAMIN TAB 500 MCG 500 MCG: 500 TAB at 08:41

## 2022-10-15 RX ADMIN — CARVEDILOL 12.5 MG: 12.5 TABLET, FILM COATED ORAL at 08:41

## 2022-10-15 RX ADMIN — ATORVASTATIN CALCIUM 40 MG: 40 TABLET, FILM COATED ORAL at 08:41

## 2022-10-15 NOTE — PROGRESS NOTES
Problem: Falls - Risk of  Goal: *Absence of Falls  Description: Document Payal Bowman Fall Risk and appropriate interventions in the flowsheet. Outcome: Progressing Towards Goal  Note: Fall Risk Interventions:  Mobility Interventions: Patient to call before getting OOB, Communicate number of staff needed for ambulation/transfer    Medication Interventions: Patient to call before getting OOB, Teach patient to arise slowly, Bed/chair exit alarm    Elimination Interventions: Toilet paper/wipes in reach, Bed/chair exit alarm, Call light in reach, Patient to call for help with toileting needs    Problem: General Medical Care Plan  Goal: *Vital signs within specified parameters  Outcome: Progressing Towards Goal  Goal: *Labs within defined limits  Outcome: Progressing Towards Goal  Goal: *Absence of infection signs and symptoms  Outcome: Progressing Towards Goal  Goal: *Optimal pain control at patient's stated goal  Outcome: Progressing Towards Goal  Goal: *Skin integrity maintained  Outcome: Progressing Towards Goal  Goal: *Fluid volume balance  Outcome: Progressing Towards Goal  Goal: *Optimize nutritional status  Outcome: Progressing Towards Goal  Goal: *Anxiety reduced or absent  Outcome: Progressing Towards Goal     Problem: Diabetes Self-Management  Goal: *Disease process and treatment process  Description: Define diabetes and identify own type of diabetes; list 3 options for treating diabetes. Outcome: Progressing Towards Goal  Goal: *Using medications safely  Description: State effect of diabetes medications on diabetes; name diabetes medication taking, action and side effects. Outcome: Progressing Towards Goal  Goal: *Monitoring blood glucose, interpreting and using results  Description: Identify recommended blood glucose targets  and personal targets.   Outcome: Progressing Towards Goal

## 2022-10-15 NOTE — PROGRESS NOTES
[address] [facility] Adult  Hospitalist Group                                                                                          Hospitalist Progress Note  Melia Francois MD  Answering service: [phone] from in house phone        Date of Service:  10/15/2022  NAME:  Tanya Pantoja  :  1940  MRN:  [id number]      Admission Summary:   Tanya Pantoja is a 81 y.o. female with history of hypertension, arthritis, history of MI/CAD s/p PCI., dyslipidemia, type 2 diabetes who presented to hospital with complaints of shortness of breath. Patient reports several months of mild dyspnea which she states has acutely worsened over the last week. She describes severe dyspnea with minimal exertion as well as intermittent episodes of orthopnea. States symptoms were severe enough today to prompt her to seek emergency room care. She admits to medical noncompliance over the last 3 years after her 's death and has been off several of her usual medications. At rest, the patient feels fine and denies any fever, chills, chest or abdominal pain, nausea, vomiting, cough, congestion, recent illness, palpitations, or dysuria. Interval history / Subjective:   Patient appears improved today with decreased SOB compared to previous, though mildly persistent. Assessment & Plan:      Dyspnea / Pulm HTN  - No overt clinical signs of decompensated heart failure on exam  - Dyspnea possibly secondary to worsening pulmonary hypertension v hypertensive urgency  - ECHO 10/14 w/ EF 47-62%,  with diastolic dysfunction with normal LV EF   - multiple CXR without obvious pathology or cause of dyspnea.  COVID negative  - Given diastolic dysfunction, will trial low dose lasix today  - Pulm consult given continued dyspnea; appreciate assistance   - Steroids per pulm     Hypertensive urgency  -Likely due to medication noncompliance  -Resume Benicar and Coreg  -Hydralazine as needed with parameters Bilateral adrenal nodules  - Noted on CT. With recommended dedicated imaging   - Obtain aldosterone and renin testing   - Plan for OP endo follow-up, may be contributing to her HTN     CAD status post PCI  -Resume Coreg and continue Lipitor  -Obtain lipid panel, TSH     NIDDM II  -SSI +Hypoglycemic protocols  -Repeat A1c     Major depressive disorder /generalized anxiety disorder  -Continue home BuSpar and Cymbalta     Obesity  -Counseled on weight loss, dieting and exercise         Code status: Full   Prophylaxis: SCDs  Care Plan discussed with: patient, nursing  Anticipated Disposition: [place] Problems  Date Reviewed: 8/8/2011            Codes Class Noted POA    Acute on chronic congestive heart failure, unspecified heart failure type [facility]) ICD-10-CM: I50.9  ICD-9-CM: 428.0  10/13/2022 Unknown             Review of Systems:   A comprehensive review of systems was negative except for that written in the HPI. Vital Signs:    Last 24hrs VS reviewed since prior progress note. Most recent are:  Visit Vitals  BP (!) 158/61 (BP 1 Location: Left upper arm, BP Patient Position: At rest)   Pulse 67   Temp 99.2 °F (37.3 °C)   Resp 22   Ht 5' (1.524 m)   Wt 72.2 kg (159 lb 2.8 oz)   SpO2 98%   BMI 31.09 kg/m²         Intake/Output Summary (Last 24 hours) at 10/15/2022 4408  Last data filed at 10/14/2022 2320  Gross per 24 hour   Intake 440 ml   Output 1240 ml   Net -800 ml        Physical Examination:     I had a face to face encounter with this patient and independently examined them on 10/15/2022 as outlined below:          Constitutional:  No acute distress, cooperative, pleasant    ENT:  Oral mucosa moist, oropharynx benign. Resp:  CTA bilaterally. No wheezing/rhonchi/rales. No accessory muscle use. CV:  Regular rhythm, normal rate, no murmurs, gallops, rubs    GI:  Soft, non distended, non tender.  normoactive bowel sounds, no hepatosplenomegaly     Musculoskeletal:  No edema, warm, 2+ pulses throughout    Neurologic:  Moves all extremities. AAOx3, CN II-XII reviewed            Data Review:    Review and/or order of clinical lab test  Review and/or order of tests in the radiology section of CPT  Review and/or order of tests in the medicine section of CPT      Labs:     Recent Labs     10/14/22  0456 10/13/22  1819   WBC 11.0 10.6   HGB 11.8 12.4   HCT 36.3 39.4   * 428*     Recent Labs     10/14/22  0456 10/13/22  1819    138   K 3.7 3.1*    101   CO2 26 28   BUN 11 8   CREA 0.99 1.19*   * 193*   CA 8.8 9.1   MG 2.1  --      Recent Labs     10/14/22  0456 10/13/22  1819   ALT 18 21    131*   TBILI 0.4 0.4   TP 7.2 8.1   ALB 3.1* 3.5   GLOB 4.1* 4.6*     Recent Labs     10/14/22  0150   INR 1.0   PTP 9.4   APTT 23.3      No results for input(s): FE, TIBC, PSAT, FERR in the last 72 hours. Lab Results   Component Value Date/Time    Folate >24.0 (H) 08/03/2009 03:59 PM      No results for input(s): PH, PCO2, PO2 in the last 72 hours. No results for input(s): CPK, CKNDX, TROIQ in the last 72 hours.     No lab exists for component: CPKMB  Lab Results   Component Value Date/Time    Cholesterol, total 142 10/14/2022 10:06 AM    HDL Cholesterol 74 10/14/2022 10:06 AM    LDL, calculated 55.4 10/14/2022 10:06 AM    Triglyceride 63 10/14/2022 10:06 AM    CHOL/HDL Ratio 1.9 10/14/2022 10:06 AM     Lab Results   Component Value Date/Time    Glucose (POC) 124 (H) 10/15/2022 07:00 AM    Glucose (POC) 145 (H) 10/14/2022 10:09 PM    Glucose (POC) 181 (H) 10/14/2022 04:16 PM    Glucose (POC) 185 (H) 10/14/2022 11:36 AM    Glucose (POC) 135 (H) 10/14/2022 06:06 AM     No results found for: COLOR, APPRN, SPGRU, REFSG, CHICO, PROTU, GLUCU, KETU, BILU, UROU, KIMBERLY, LEUKU, GLUKE, EPSU, BACTU, WBCU, RBCU, CASTS, UCRY      Medications Reviewed:     Current Facility-Administered Medications   Medication Dose Route Frequency    ascorbic acid (vitamin C) (VITAMIN C) tablet 500 mg  500 mg Oral DAILY busPIRone (BUSPAR) tablet 10 mg  10 mg Oral TID    cyanocobalamin (VITAMIN B12) tablet 500 mcg  500 mcg Oral DAILY    . PHARMACY TO SUBSTITUTE PER PROTOCOL (Reordered from: DULOXETINE HCL (CYMBALTA PO))    Per Protocol    sodium chloride (NS) flush 5-40 mL  5-40 mL IntraVENous Q8H    sodium chloride (NS) flush 5-40 mL  5-40 mL IntraVENous PRN    acetaminophen (TYLENOL) tablet 650 mg  650 mg Oral Q6H PRN    Or    acetaminophen (TYLENOL) suppository 650 mg  650 mg Rectal Q6H PRN    polyethylene glycol (MIRALAX) packet 17 g  17 g Oral DAILY PRN    ondansetron (ZOFRAN ODT) tablet 4 mg  4 mg Oral Q8H PRN    Or    ondansetron (ZOFRAN) injection 4 mg  4 mg IntraVENous Q6H PRN    hydrALAZINE (APRESOLINE) 20 mg/mL injection 20 mg  20 mg IntraVENous Q6H PRN    atorvastatin (LIPITOR) tablet 40 mg  40 mg Oral DAILY    traZODone (DESYREL) tablet 100 mg  100 mg Oral QHS    glucose chewable tablet 16 g  4 Tablet Oral PRN    glucagon (GLUCAGEN) injection 1 mg  1 mg IntraMUSCular PRN    sodium chloride (NS) flush 5-40 mL  5-40 mL IntraVENous Q8H    sodium chloride (NS) flush 5-40 mL  5-40 mL IntraVENous PRN    polyethylene glycol (MIRALAX) packet 17 g  17 g Oral DAILY PRN    dextrose 10 % infusion 0-250 mL  0-250 mL IntraVENous PRN    insulin lispro (HUMALOG) injection   SubCUTAneous AC&HS    losartan/hydroCHLOROthiazide (HYZAAR) 50/12.5 mg   Oral DAILY    carvediloL (COREG) tablet 12.5 mg  12.5 mg Oral BID WITH MEALS    albuterol-ipratropium (DUO-NEB) 2.5 MG-0.5 MG/3 ML  3 mL Nebulization Q4H RT     ______________________________________________________________________  EXPECTED LENGTH OF STAY: 1d 21h  ACTUAL LENGTH OF STAY:          2                 Dexter Diaz MD

## 2022-10-15 NOTE — CONSULTS
[address]    Name:  Ana Cristina Velazquez  MR#:  [id number]  :  1940  ACCOUNT #:  [de-identified]  DATE OF SERVICE:  10/15/2022    PULMONARY CONSULTATION    REQUESTING PHYSICIAN:  Hospitalist Service. INDICATIONS:  Possible pulmonary hypertension. CHIEF COMPLAINT:  Shortness of breath. HISTORY OF PRESENT ILLNESS:  The patient is an 20-year-old  lady with presentation to the hospital secondary to shortness of breath. The patient had been seen by Cardiology, but upon presentation, was noted to have hypertensive urgency with a systolic blood pressure anywhere of 184 to 202. Her proBNP was only 451. Had a spiral CT scan that did not show any lung disease and had a mildly enlarged pulmonary artery. Echocardiogram was performed, it did not show any right ventricular dilation and had a preserved left ventricular ejection fraction. Nurse was in the room this morning, was concerned about her shortness of breath and has a bronchitic style cough. She reports being a nonsmoker and no sick contacts, no recent illnesses after asking her twice, however, per cardiologist's note, had complained of an URI one week ago and had azithromycin. PAST MEDICAL HISTORY:  1.  Diabetes. 2.  Hypertension. 3.  Depression. 4.  Peptic ulcer disease. 5.  Nonsmoker. HOME MEDICATIONS:  1. Zocor. 2.  Recent Z-Peter. 3.  Vitamin C.  4.  Vitamin D.  5.  BuSpar. 6.  Byetta. 7.  Cymbalta. 8.  Multivitamin. 9.  Trazodone. ALLERGIES:  ACTONEL AND PENICILLIN. FAMILY HISTORY:  Diabetes and hypertension. SOCIAL HISTORY:  Nonsmoker, lives independently. REVIEW OF SYSTEMS:  GENERAL:  No fevers. No chills. HEENT:  Eyes:  No double or blurred vision. Ears:  No tinnitus or deafness. Nose:  No sinusitis, obstruction, or nosebleed. Throat:  No soreness or hoarseness. CARDIOVASCULAR:  See HPI. PULMONARY:  See HPI. GASTROINTESTINAL:  No nausea or vomiting.   GENITOURINARY:  No dysuria or hematuria. ENDOCRINE:  No polyuria or polydipsia. LYMPHATICS:  No enlarged or painful lymph nodes. HEMATOLOGIC:  No bleeding or bruising. PSYCHIATRIC:  No anxiety or depression. PHYSICAL EXAMINATION:  GENERAL:  A well-developed and well-nourished  lady. VITAL SIGNS:  Temperature is 99.2 with a pulse of 67, respiratory rate of 22, blood pressure 158/61, and saturation is 97% on room air. HEENT:  Normocephalic and atraumatic. NECK:  Supple. No thyroid masses, JVD, or carotid bruits. LYMPHATICS:  No palpable supraclavicular, submandibular, or cervical lymphadenopathy. LUNGS:  Rhonchorous with wheezes. CARDIOVASCULAR:  Regular rhythm. ABDOMEN:  Soft and nontender. EXTREMITIES:  No cyanosis or clubbing. NEUROLOGIC:  Cranial nerves II through XII grossly intact. LABORATORY AND DIAGNOSTIC DATA:  White count of 11.0 with a hemoglobin of 11.8 and a platelet count of 301. CT scan, no PE, no lung disease, no pneumonia. IMPRESSION:  1. Shortness of breath. 2.  Hypertensive urgency. 3.  Rhonchi. DISCUSSION AND PLAN:  1.  I agree with nebs. 2.  We will place on low dose prednisone at 40 mg a day. We will have to watch her blood pressure closely. 3.  Continue Cardiology management as planned. 4.  We would repeat an echocardiogram when more stable to see if there truly is an elevation of pulmonary pressures. However, the enlargement of the pulmonary artery on the CT scan could be due secondary to just the elevated blood pressure at this point.         Eneida Dodson MD      [id number]  D:  10/15/2022 9:38  T:  10/15/2022 12:28  JOB #:  [id number]

## 2022-10-15 NOTE — PROGRESS NOTES
0730: Bedside shift change report given to Jay Rondon (oncoming nurse) by Cristopher Johansen (offgoing nurse). Report included the following information SBAR, Kardex, Intake/Output, MAR, and Recent Results.

## 2022-10-16 LAB
ANION GAP SERPL CALC-SCNC: 8 MMOL/L (ref 5–15)
APPEARANCE UR: CLEAR
BACTERIA URNS QL MICRO: NEGATIVE /HPF
BASOPHILS # BLD: 0 K/UL (ref 0–0.1)
BASOPHILS NFR BLD: 0 % (ref 0–1)
BILIRUB UR QL: NEGATIVE
BUN SERPL-MCNC: 25 MG/DL (ref 6–20)
BUN/CREAT SERPL: 22 (ref 12–20)
CALCIUM SERPL-MCNC: 8.7 MG/DL (ref 8.5–10.1)
CHLORIDE SERPL-SCNC: 102 MMOL/L (ref 97–108)
CO2 SERPL-SCNC: 25 MMOL/L (ref 21–32)
COLOR UR: ABNORMAL
CREAT SERPL-MCNC: 1.16 MG/DL (ref 0.55–1.02)
DIFFERENTIAL METHOD BLD: ABNORMAL
EOSINOPHIL # BLD: 0 K/UL (ref 0–0.4)
EOSINOPHIL NFR BLD: 0 % (ref 0–7)
EPITH CASTS URNS QL MICRO: ABNORMAL /LPF
ERYTHROCYTE [DISTWIDTH] IN BLOOD BY AUTOMATED COUNT: 19 % (ref 11.5–14.5)
GLUCOSE BLD STRIP.AUTO-MCNC: 136 MG/DL (ref 65–117)
GLUCOSE BLD STRIP.AUTO-MCNC: 179 MG/DL (ref 65–117)
GLUCOSE BLD STRIP.AUTO-MCNC: 200 MG/DL (ref 65–117)
GLUCOSE BLD STRIP.AUTO-MCNC: 229 MG/DL (ref 65–117)
GLUCOSE SERPL-MCNC: 124 MG/DL (ref 65–100)
GLUCOSE UR STRIP.AUTO-MCNC: NEGATIVE MG/DL
HCT VFR BLD AUTO: 32.9 % (ref 35–47)
HGB BLD-MCNC: 10.9 G/DL (ref 11.5–16)
HGB UR QL STRIP: NEGATIVE
HYALINE CASTS URNS QL MICRO: ABNORMAL /LPF (ref 0–5)
IMM GRANULOCYTES # BLD AUTO: 0.1 K/UL (ref 0–0.04)
IMM GRANULOCYTES NFR BLD AUTO: 1 % (ref 0–0.5)
KETONES UR QL STRIP.AUTO: ABNORMAL MG/DL
LEUKOCYTE ESTERASE UR QL STRIP.AUTO: ABNORMAL
LYMPHOCYTES # BLD: 1.2 K/UL (ref 0.8–3.5)
LYMPHOCYTES NFR BLD: 10 % (ref 12–49)
MCH RBC QN AUTO: 27.5 PG (ref 26–34)
MCHC RBC AUTO-ENTMCNC: 33.1 G/DL (ref 30–36.5)
MCV RBC AUTO: 83.1 FL (ref 80–99)
MONOCYTES # BLD: 1 K/UL (ref 0–1)
MONOCYTES NFR BLD: 8 % (ref 5–13)
NEUTS SEG # BLD: 9.5 K/UL (ref 1.8–8)
NEUTS SEG NFR BLD: 81 % (ref 32–75)
NITRITE UR QL STRIP.AUTO: NEGATIVE
NRBC # BLD: 0 K/UL (ref 0–0.01)
NRBC BLD-RTO: 0 PER 100 WBC
PH UR STRIP: 5 [PH] (ref 5–8)
PLATELET # BLD AUTO: 473 K/UL (ref 150–400)
PMV BLD AUTO: 9.2 FL (ref 8.9–12.9)
POTASSIUM SERPL-SCNC: 4 MMOL/L (ref 3.5–5.1)
PROT UR STRIP-MCNC: NEGATIVE MG/DL
RBC # BLD AUTO: 3.96 M/UL (ref 3.8–5.2)
RBC #/AREA URNS HPF: ABNORMAL /HPF (ref 0–5)
SERVICE CMNT-IMP: ABNORMAL
SODIUM SERPL-SCNC: 135 MMOL/L (ref 136–145)
SP GR UR REFRACTOMETRY: 1.01 (ref 1–1.03)
UA: UC IF INDICATED,UAUC: ABNORMAL
UROBILINOGEN UR QL STRIP.AUTO: 0.2 EU/DL (ref 0.2–1)
WBC # BLD AUTO: 11.8 K/UL (ref 3.6–11)
WBC URNS QL MICRO: ABNORMAL /HPF (ref 0–4)

## 2022-10-16 PROCEDURE — 94640 AIRWAY INHALATION TREATMENT: CPT

## 2022-10-16 PROCEDURE — 74011000250 HC RX REV CODE- 250: Performed by: FAMILY MEDICINE

## 2022-10-16 PROCEDURE — 74011000250 HC RX REV CODE- 250: Performed by: STUDENT IN AN ORGANIZED HEALTH CARE EDUCATION/TRAINING PROGRAM

## 2022-10-16 PROCEDURE — 74011636637 HC RX REV CODE- 636/637: Performed by: INTERNAL MEDICINE

## 2022-10-16 PROCEDURE — 74011250637 HC RX REV CODE- 250/637: Performed by: STUDENT IN AN ORGANIZED HEALTH CARE EDUCATION/TRAINING PROGRAM

## 2022-10-16 PROCEDURE — 65270000046 HC RM TELEMETRY

## 2022-10-16 PROCEDURE — 80048 BASIC METABOLIC PNL TOTAL CA: CPT

## 2022-10-16 PROCEDURE — 82962 GLUCOSE BLOOD TEST: CPT

## 2022-10-16 PROCEDURE — 74011636637 HC RX REV CODE- 636/637: Performed by: STUDENT IN AN ORGANIZED HEALTH CARE EDUCATION/TRAINING PROGRAM

## 2022-10-16 PROCEDURE — 81001 URINALYSIS AUTO W/SCOPE: CPT

## 2022-10-16 PROCEDURE — 85025 COMPLETE CBC W/AUTO DIFF WBC: CPT

## 2022-10-16 PROCEDURE — 36415 COLL VENOUS BLD VENIPUNCTURE: CPT

## 2022-10-16 PROCEDURE — 74011250637 HC RX REV CODE- 250/637: Performed by: INTERNAL MEDICINE

## 2022-10-16 PROCEDURE — 74011250637 HC RX REV CODE- 250/637: Performed by: FAMILY MEDICINE

## 2022-10-16 RX ORDER — IPRATROPIUM BROMIDE AND ALBUTEROL SULFATE 2.5; .5 MG/3ML; MG/3ML
3 SOLUTION RESPIRATORY (INHALATION)
Status: DISCONTINUED | OUTPATIENT
Start: 2022-10-16 | End: 2022-10-17 | Stop reason: HOSPADM

## 2022-10-16 RX ADMIN — IPRATROPIUM BROMIDE AND ALBUTEROL SULFATE 3 ML: .5; 3 SOLUTION RESPIRATORY (INHALATION) at 13:12

## 2022-10-16 RX ADMIN — TRAZODONE HYDROCHLORIDE 100 MG: 100 TABLET ORAL at 00:01

## 2022-10-16 RX ADMIN — Medication 3 UNITS: at 16:34

## 2022-10-16 RX ADMIN — SODIUM CHLORIDE, PRESERVATIVE FREE 10 ML: 5 INJECTION INTRAVENOUS at 22:00

## 2022-10-16 RX ADMIN — Medication 3 UNITS: at 12:52

## 2022-10-16 RX ADMIN — BUSPIRONE HYDROCHLORIDE 10 MG: 5 TABLET ORAL at 21:54

## 2022-10-16 RX ADMIN — SODIUM CHLORIDE, PRESERVATIVE FREE 10 ML: 5 INJECTION INTRAVENOUS at 16:20

## 2022-10-16 RX ADMIN — SODIUM CHLORIDE, PRESERVATIVE FREE 10 ML: 5 INJECTION INTRAVENOUS at 00:01

## 2022-10-16 RX ADMIN — BUSPIRONE HYDROCHLORIDE 10 MG: 5 TABLET ORAL at 16:35

## 2022-10-16 RX ADMIN — HYDROCHLOROTHIAZIDE: 25 TABLET ORAL at 09:06

## 2022-10-16 RX ADMIN — Medication 2 UNITS: at 00:01

## 2022-10-16 RX ADMIN — BUSPIRONE HYDROCHLORIDE 10 MG: 5 TABLET ORAL at 00:01

## 2022-10-16 RX ADMIN — CYANOCOBALAMIN TAB 500 MCG 500 MCG: 500 TAB at 09:06

## 2022-10-16 RX ADMIN — SODIUM CHLORIDE, PRESERVATIVE FREE 10 ML: 5 INJECTION INTRAVENOUS at 08:01

## 2022-10-16 RX ADMIN — DULOXETINE HYDROCHLORIDE 60 MG: 60 CAPSULE, DELAYED RELEASE ORAL at 09:07

## 2022-10-16 RX ADMIN — PREDNISONE 40 MG: 20 TABLET ORAL at 09:06

## 2022-10-16 RX ADMIN — OXYCODONE HYDROCHLORIDE AND ACETAMINOPHEN 500 MG: 500 TABLET ORAL at 09:06

## 2022-10-16 RX ADMIN — TRAZODONE HYDROCHLORIDE 100 MG: 100 TABLET ORAL at 21:54

## 2022-10-16 RX ADMIN — IPRATROPIUM BROMIDE AND ALBUTEROL SULFATE 3 ML: .5; 3 SOLUTION RESPIRATORY (INHALATION) at 02:45

## 2022-10-16 RX ADMIN — CARVEDILOL 12.5 MG: 12.5 TABLET, FILM COATED ORAL at 09:06

## 2022-10-16 RX ADMIN — IPRATROPIUM BROMIDE AND ALBUTEROL SULFATE 3 ML: .5; 3 SOLUTION RESPIRATORY (INHALATION) at 20:12

## 2022-10-16 RX ADMIN — IPRATROPIUM BROMIDE AND ALBUTEROL SULFATE 3 ML: .5; 3 SOLUTION RESPIRATORY (INHALATION) at 05:16

## 2022-10-16 RX ADMIN — CARVEDILOL 12.5 MG: 12.5 TABLET, FILM COATED ORAL at 16:35

## 2022-10-16 RX ADMIN — ATORVASTATIN CALCIUM 40 MG: 40 TABLET, FILM COATED ORAL at 09:06

## 2022-10-16 RX ADMIN — BUSPIRONE HYDROCHLORIDE 10 MG: 5 TABLET ORAL at 09:06

## 2022-10-16 NOTE — PROGRESS NOTES
Cardiology Progress Note  10/16/2022     Admit Date: 10/13/2022  Admit Diagnosis: Acute on chronic congestive heart failure, unspecified heart failure type ([facility] [address] [I50.9]  CC: none currently    Assessment/Plan:   Coby Jenkins indicates that her breathing is much better today. She needs to increase her activity and she should be able to go home tomorrow or Tuesday. For other plans, see orders. Subjective: Luisa Rader reports   Chest Pain:  [x]  none;  consistent with []  non-cardiac  []  atypical  []  angina             [x]  none now    []  on-going  Dyspnea: [x]  none    []  at rest    []  with exertion   []  improved    []  unchanged    []  worsening  PND:       [x]  none      []  overnight      Orthopnea:   [x]  none        []  improved         []  unchanged        []  worsening  Presyncope: [x]  none        []  improved         []  unchanged        []  worsening  Ambulated in hallway without symptoms  []  Yes  Ambulated in room without symptoms  []  Yes    Objective:    Physical Exam:  Overall VSSAF;  Visit Vitals  BP 96/73 (BP 1 Location: Left upper arm, BP Patient Position: At rest)   Pulse 63   Temp 99 °F (37.2 °C)   Resp 18   Ht 5' (1.524 m)   Wt 72 kg (158 lb 11.7 oz)   SpO2 93%   BMI 31.00 kg/m²     Temp (24hrs), Av.3 °F (36.8 °C), Min:98 °F (36.7 °C), Max:99 °F (37.2 °C)    Patient Vitals for the past 8 hrs:   Pulse   10/16/22 1253 63   10/16/22 1200 (!) 55   10/16/22 1000 64   10/16/22 0802 60   10/16/22 0557 (!) 59    Patient Vitals for the past 8 hrs:   Resp   10/16/22 1253 18   10/16/22 0802 22    Patient Vitals for the past 8 hrs:   BP   10/16/22 1253 96/73   10/16/22 0802 118/68        Intake/Output Summary (Last 24 hours) at 10/16/2022 1344  Last data filed at 10/16/2022 1253  Gross per 24 hour   Intake 1080 ml   Output --   Net 1080 ml       General Appearance: Well developed, well nourished, no acute distress. Ears/Nose/Mouth/Throat:   Normal MM; anicteric.      JVP: WNL Resp:   Lungs clear to auscultation bilaterally. Nl resp effort. Cardiovascular:  RRR, S1, S2 normal, no new murmur. No gallop or rub. Abdomen:   Soft, non-tender, bowel sounds are present. Extremities: No edema bilaterally. Skin:  Neuro: Warm and dry. A/O x3, grossly nonfocal    []  cath site intact w/o hematoma or bruit; distal pulse unchanged. Data Review:     Telemetry independently reviewed : []  sinus      []  chronic afib     []  par afib    []  NSVT    ECG independently reviewed:  []  NSR         []  no significant changes  [] no new ECG provided for review  Lab results reviewed as noted below. Current medications reviewed as noted below. No results for input(s): PH, PCO2, PO2 in the last 72 hours. No results for input(s): CPK, CKMB, TROIQ in the last 72 hours. Recent Labs     10/16/22  0411 10/15/22  1155 10/14/22  0456 10/13/22  1819   * 135* 136 138   K 4.0 3.9 3.7 3.1*    103 103 101   CO2 25 28 26 28   BUN 25* 19 11 8   CREA 1.16* 1.27* 0.99 1.19*   * 198* 157* 193*   CA 8.7 8.8 8.8 9.1   ALB  --   --  3.1* 3.5   WBC 11.8* 9.9 11.0 10.6   HGB 10.9* 11.8 11.8 12.4   HCT 32.9* 35.3 36.3 39.4   * 445* 446* 428*     Recent Labs     10/14/22  0456 10/13/22  1819   ALT 18 21    131*   TBILI 0.4 0.4   TP 7.2 8.1   ALB 3.1* 3.5   GLOB 4.1* 4.6*     Recent Labs     10/14/22  0150   INR 1.0   PTP 9.4   APTT 23.3      No results for input(s): FE, TIBC, PSAT, FERR in the last 72 hours.    Lab Results   Component Value Date/Time    Glucose (POC) 200 (H) 10/16/2022 11:36 AM    Glucose (POC) 136 (H) 10/16/2022 08:00 AM    Glucose (POC) 215 (H) 10/15/2022 11:45 PM    Glucose (POC) 222 (H) 10/15/2022 04:57 PM    Glucose (POC) 188 (H) 10/15/2022 11:38 AM       Current Facility-Administered Medications   Medication Dose Route Frequency    predniSONE (DELTASONE) tablet 40 mg  40 mg Oral DAILY WITH BREAKFAST    ascorbic acid (vitamin C) (VITAMIN C) tablet 500 mg  500 mg Oral DAILY    busPIRone (BUSPAR) tablet 10 mg  10 mg Oral TID    cyanocobalamin (VITAMIN B12) tablet 500 mcg  500 mcg Oral DAILY    DULoxetine (CYMBALTA) capsule 60 mg  60 mg Oral DAILY    sodium chloride (NS) flush 5-40 mL  5-40 mL IntraVENous Q8H    sodium chloride (NS) flush 5-40 mL  5-40 mL IntraVENous PRN    acetaminophen (TYLENOL) tablet 650 mg  650 mg Oral Q6H PRN    Or    acetaminophen (TYLENOL) suppository 650 mg  650 mg Rectal Q6H PRN    polyethylene glycol (MIRALAX) packet 17 g  17 g Oral DAILY PRN    ondansetron (ZOFRAN ODT) tablet 4 mg  4 mg Oral Q8H PRN    Or    ondansetron (ZOFRAN) injection 4 mg  4 mg IntraVENous Q6H PRN    hydrALAZINE (APRESOLINE) 20 mg/mL injection 20 mg  20 mg IntraVENous Q6H PRN    atorvastatin (LIPITOR) tablet 40 mg  40 mg Oral DAILY    traZODone (DESYREL) tablet 100 mg  100 mg Oral QHS    glucose chewable tablet 16 g  4 Tablet Oral PRN    glucagon (GLUCAGEN) injection 1 mg  1 mg IntraMUSCular PRN    sodium chloride (NS) flush 5-40 mL  5-40 mL IntraVENous Q8H    sodium chloride (NS) flush 5-40 mL  5-40 mL IntraVENous PRN    polyethylene glycol (MIRALAX) packet 17 g  17 g Oral DAILY PRN    dextrose 10 % infusion 0-250 mL  0-250 mL IntraVENous PRN    insulin lispro (HUMALOG) injection   SubCUTAneous AC&HS    losartan/hydroCHLOROthiazide (HYZAAR) 50/12.5 mg   Oral DAILY    carvediloL (COREG) tablet 12.5 mg  12.5 mg Oral BID WITH MEALS    albuterol-ipratropium (DUO-NEB) 2.5 MG-0.5 MG/3 ML  3 mL Nebulization Q4H RT        Jef Carvajal MD

## 2022-10-16 NOTE — PROGRESS NOTES
Problem: Falls - Risk of  Goal: *Absence of Falls  Description: Document Mame Watts Fall Risk and appropriate interventions in the flowsheet. Outcome: Progressing Towards Goal  Note: Fall Risk Interventions:  Mobility Interventions: Bed/chair exit alarm, Communicate number of staff needed for ambulation/transfer, Patient to call before getting OOB         Medication Interventions: Patient to call before getting OOB, Teach patient to arise slowly, Bed/chair exit alarm    Elimination Interventions: Call light in reach, Bed/chair exit alarm     Problem: General Medical Care Plan  Goal: *Vital signs within specified parameters  Outcome: Progressing Towards Goal  Goal: *Labs within defined limits  Outcome: Progressing Towards Goal  Goal: *Absence of infection signs and symptoms  Outcome: Progressing Towards Goal  Goal: *Optimal pain control at patient's stated goal  Outcome: Progressing Towards Goal  Goal: *Skin integrity maintained  Outcome: Progressing Towards Goal  Goal: *Fluid volume balance  Outcome: Progressing Towards Goal  Goal: *Optimize nutritional status  Outcome: Progressing Towards Goal  Goal: *Anxiety reduced or absent  Outcome: Progressing Towards Goal     Problem: Diabetes Self-Management  Goal: *Disease process and treatment process  Description: Define diabetes and identify own type of diabetes; list 3 options for treating diabetes. Outcome: Progressing Towards Goal  Goal: *Using medications safely  Description: State effect of diabetes medications on diabetes; name diabetes medication taking, action and side effects. Outcome: Progressing Towards Goal  Goal: *Monitoring blood glucose, interpreting and using results  Description: Identify recommended blood glucose targets  and personal targets.   Outcome: Progressing Towards Goal

## 2022-10-16 NOTE — PROGRESS NOTES
0730: Bedside shift change report given to Fazal Palacio (oncoming nurse) by Dalila Gustafson (offgoing nurse). Report included the following information SBAR, Kardex, Intake/Output, MAR, and Recent Results. 2006: TRANSFER - OUT REPORT:    Verbal report given to Jayden Lima (name) [facility]       Report consisted of patients Situation, Background, Assessment and   Recommendations(SBAR). Information from the following report(s) SBAR, Kardex, ED Summary, Intake/Output, MAR, and Recent Results was reviewed with the receiving nurse. Lines:   Peripheral IV 10/15/22 Posterior;Proximal;Right Forearm (Active)   Site Assessment Clean, dry, & intact 10/16/22 0855   Phlebitis Assessment 0 10/16/22 0855   Infiltration Assessment 0 10/16/22 0855   Dressing Status Clean, dry, & intact 10/16/22 0855   Dressing Type Tape;Transparent 10/16/22 0855   Hub Color/Line Status Blue;Patent;Capped;Flushed 10/16/22 0855   Action Taken Open ports on tubing capped 10/16/22 0855   Alcohol Cap Used Yes 10/16/22 0855        Opportunity for questions and clarification was provided.       Patient transported with:   X-Scan Imaging

## 2022-10-16 NOTE — PROGRESS NOTES
[address] [facility] Adult  Hospitalist Group                                                                                          Hospitalist Progress Note  Abbi Hull MD  Answering service: [phone] OR [phone] from in house phone        Date of Service:  10/16/2022  NAME:  Apollo Diaz  :  1940  MRN:  [id number]      Admission Summary:   Apollo Diaz is a 81 y.o. female with history of hypertension, arthritis, history of MI/CAD s/p PCI., dyslipidemia, type 2 diabetes who presented to hospital with complaints of shortness of breath. Patient reports several months of mild dyspnea which she states has acutely worsened over the last week. She describes severe dyspnea with minimal exertion as well as intermittent episodes of orthopnea. States symptoms were severe enough today to prompt her to seek emergency room care. She admits to medical noncompliance over the last 3 years after her 's death and has been off several of her usual medications. At rest, the patient feels fine and denies any fever, chills, chest or abdominal pain, nausea, vomiting, cough, congestion, recent illness, palpitations, or dysuria. Interval history / Subjective:   Significant improvement from a respiratory standpoint today. Decreased WOB. Feels better. Assessment & Plan:      Dyspnea / Pulm HTN  - No overt clinical signs of decompensated heart failure on exam  - Dyspnea possibly secondary to worsening pulmonary hypertension v hypertensive urgency  - ECHO 10/14 w/ EF 93-34%,  with diastolic dysfunction with normal LV EF   - multiple CXR without obvious pathology or cause of dyspnea.  COVID negative  - Given diastolic dysfunction, s/p lasix x 1 dose yesterday,mild bump in Cr and BUN  - Pulm consult given continued dyspnea; appreciate assistance   - Steroids per pulm     Hypertensive urgency, improving   -Likely due to medication noncompliance  -Resume losartan/HCTZ and Coreg  -Hydralazine as needed with parameters (has not needed for 2 days)     Bilateral adrenal nodules  - Noted on CT. With recommended dedicated imaging   - Obtain aldosterone and renin testing   - Plan for OP endo follow-up, may be contributing to her HTN     CAD status post PCI  -Resume Coreg and continue Lipitor  -Obtain lipid panel, TSH     NIDDM II  -SSI +Hypoglycemic protocols  -Repeat A1c     Major depressive disorder /generalized anxiety disorder  -Continue home BuSpar and Cymbalta     Obesity  -Counseled on weight loss, dieting and exercise         Code status: Full   Prophylaxis: SCDs  Care Plan discussed with: patient, nursing  Anticipated Disposition: 24h if resp and kidney function stable     Hospital Problems  Date Reviewed: 8/8/2011            Codes Class Noted POA    Acute on chronic congestive heart failure, unspecified heart failure type ([facility] [address] ICD-10-CM: I50.9  ICD-9-CM: 428.0  10/13/2022 Unknown             Review of Systems:   A comprehensive review of systems was negative except for that written in the HPI. Vital Signs:    Last 24hrs VS reviewed since prior progress note. Most recent are:  Visit Vitals  BP (!) 119/51 (BP 1 Location: Right upper arm, BP Patient Position: At rest)   Pulse (!) 59   Temp 98.2 °F (36.8 °C)   Resp 22   Ht 5' (1.524 m)   Wt 72.2 kg (159 lb 2.8 oz)   SpO2 94%   BMI 31.09 kg/m²         Intake/Output Summary (Last 24 hours) at 10/16/2022 0751  Last data filed at 10/15/2022 2030  Gross per 24 hour   Intake 960 ml   Output --   Net 960 ml        Physical Examination:     I had a face to face encounter with this patient and independently examined them on 10/16/2022 as outlined below:          Constitutional:  No acute distress, cooperative, pleasant    ENT:  Oral mucosa moist, oropharynx benign. Resp:  CTA bilaterally. No wheezing/rhonchi/rales. No accessory muscle use. CV:  Regular rhythm, normal rate, no murmurs, gallops, rubs    GI:  Soft, non distended, non tender.  normoactive bowel sounds, no hepatosplenomegaly     Musculoskeletal:  No edema, warm, 2+ pulses throughout    Neurologic:  Moves all extremities. AAOx3, CN II-XII reviewed            Data Review:    Review and/or order of clinical lab test  Review and/or order of tests in the radiology section of CPT  Review and/or order of tests in the medicine section of [facility]      Labs:     Recent Labs     10/16/22  0411 10/15/22  1155   WBC 11.8* 9.9   HGB 10.9* 11.8   HCT 32.9* 35.3   * 445*     Recent Labs     10/16/22  0411 10/15/22  1155 10/14/22  0456   * 135* 136   K 4.0 3.9 3.7    103 103   CO2 25 28 26   BUN 25* 19 11   CREA 1.16* 1.27* 0.99   * 198* 157*   CA 8.7 8.8 8.8   MG  --   --  2.1     Recent Labs     10/14/22  0456 10/13/22  1819   ALT 18 21    131*   TBILI 0.4 0.4   TP 7.2 8.1   ALB 3.1* 3.5   GLOB 4.1* 4.6*     Recent Labs     10/14/22  0150   INR 1.0   PTP 9.4   APTT 23.3      No results for input(s): FE, TIBC, PSAT, FERR in the last 72 hours. Lab Results   Component Value Date/Time    Folate >24.0 (H) 08/03/2009 03:59 PM      No results for input(s): PH, PCO2, PO2 in the last 72 hours. No results for input(s): CPK, CKNDX, TROIQ in the last 72 hours.     No lab exists for component: CPKMB  Lab Results   Component Value Date/Time    Cholesterol, total 142 10/14/2022 10:06 AM    HDL Cholesterol 74 10/14/2022 10:06 AM    LDL, calculated 55.4 10/14/2022 10:06 AM    Triglyceride 63 10/14/2022 10:06 AM    CHOL/HDL Ratio 1.9 10/14/2022 10:06 AM     Lab Results   Component Value Date/Time    Glucose (POC) 215 (H) 10/15/2022 11:45 PM    Glucose (POC) 222 (H) 10/15/2022 04:57 PM    Glucose (POC) 188 (H) 10/15/2022 11:38 AM    Glucose (POC) 124 (H) 10/15/2022 07:00 AM    Glucose (POC) 145 (H) 10/14/2022 10:09 PM     No results found for: COLOR, APPRN, SPGRU, REFSG, CHICO, PROTU, GLUCU, KETU, BILU, UROU, KIMBERLY, LEUKU, GLUKE, EPSU, BACTU, WBCU, RBCU, CASTS, UCRY      Medications Reviewed:     Current Facility-Administered Medications   Medication Dose Route Frequency    predniSONE (DELTASONE) tablet 40 mg  40 mg Oral DAILY WITH BREAKFAST    ascorbic acid (vitamin C) (VITAMIN C) tablet 500 mg  500 mg Oral DAILY    busPIRone (BUSPAR) tablet 10 mg  10 mg Oral TID    cyanocobalamin (VITAMIN B12) tablet 500 mcg  500 mcg Oral DAILY    DULoxetine (CYMBALTA) capsule 60 mg  60 mg Oral DAILY    sodium chloride (NS) flush 5-40 mL  5-40 mL IntraVENous Q8H    sodium chloride (NS) flush 5-40 mL  5-40 mL IntraVENous PRN    acetaminophen (TYLENOL) tablet 650 mg  650 mg Oral Q6H PRN    Or    acetaminophen (TYLENOL) suppository 650 mg  650 mg Rectal Q6H PRN    polyethylene glycol (MIRALAX) packet 17 g  17 g Oral DAILY PRN    ondansetron (ZOFRAN ODT) tablet 4 mg  4 mg Oral Q8H PRN    Or    ondansetron (ZOFRAN) injection 4 mg  4 mg IntraVENous Q6H PRN    hydrALAZINE (APRESOLINE) 20 mg/mL injection 20 mg  20 mg IntraVENous Q6H PRN    atorvastatin (LIPITOR) tablet 40 mg  40 mg Oral DAILY    traZODone (DESYREL) tablet 100 mg  100 mg Oral QHS    glucose chewable tablet 16 g  4 Tablet Oral PRN    glucagon (GLUCAGEN) injection 1 mg  1 mg IntraMUSCular PRN    sodium chloride (NS) flush 5-40 mL  5-40 mL IntraVENous Q8H    sodium chloride (NS) flush 5-40 mL  5-40 mL IntraVENous PRN    polyethylene glycol (MIRALAX) packet 17 g  17 g Oral DAILY PRN    dextrose 10 % infusion 0-250 mL  0-250 mL IntraVENous PRN    insulin lispro (HUMALOG) injection   SubCUTAneous AC&HS    losartan/hydroCHLOROthiazide (HYZAAR) 50/12.5 mg   Oral DAILY    carvediloL (COREG) tablet 12.5 mg  12.5 mg Oral BID WITH MEALS    albuterol-ipratropium (DUO-NEB) 2.5 MG-0.5 MG/3 ML  3 mL Nebulization Q4H RT     ______________________________________________________________________  EXPECTED LENGTH OF STAY: 1d 21h  ACTUAL LENGTH OF STAY:          3                 Cristina Barnes MD

## 2022-10-17 ENCOUNTER — TELEPHONE (OUTPATIENT)
Dept: PRIMARY CARE CLINIC | Age: 82
End: 2022-10-17

## 2022-10-17 VITALS
OXYGEN SATURATION: 90 % | HEIGHT: 60 IN | HEART RATE: 56 BPM | BODY MASS INDEX: 31.16 KG/M2 | TEMPERATURE: 98.1 F | SYSTOLIC BLOOD PRESSURE: 137 MMHG | WEIGHT: 158.73 LBS | DIASTOLIC BLOOD PRESSURE: 67 MMHG | RESPIRATION RATE: 16 BRPM

## 2022-10-17 LAB
ANION GAP SERPL CALC-SCNC: 7 MMOL/L (ref 5–15)
BASOPHILS # BLD: 0.1 K/UL (ref 0–0.1)
BASOPHILS NFR BLD: 0 % (ref 0–1)
BUN SERPL-MCNC: 31 MG/DL (ref 6–20)
BUN/CREAT SERPL: 25 (ref 12–20)
CALCIUM SERPL-MCNC: 8.4 MG/DL (ref 8.5–10.1)
CHLORIDE SERPL-SCNC: 103 MMOL/L (ref 97–108)
CO2 SERPL-SCNC: 24 MMOL/L (ref 21–32)
CREAT SERPL-MCNC: 1.25 MG/DL (ref 0.55–1.02)
DIFFERENTIAL METHOD BLD: ABNORMAL
EOSINOPHIL # BLD: 0.1 K/UL (ref 0–0.4)
EOSINOPHIL NFR BLD: 1 % (ref 0–7)
ERYTHROCYTE [DISTWIDTH] IN BLOOD BY AUTOMATED COUNT: 17.6 % (ref 11.5–14.5)
GLUCOSE BLD STRIP.AUTO-MCNC: 111 MG/DL (ref 65–117)
GLUCOSE BLD STRIP.AUTO-MCNC: 213 MG/DL (ref 65–117)
GLUCOSE SERPL-MCNC: 109 MG/DL (ref 65–100)
HCT VFR BLD AUTO: 32.5 % (ref 35–47)
HGB BLD-MCNC: 10.8 G/DL (ref 11.5–16)
IMM GRANULOCYTES # BLD AUTO: 0 K/UL (ref 0–0.04)
IMM GRANULOCYTES NFR BLD AUTO: 0 % (ref 0–0.5)
LYMPHOCYTES # BLD: 2 K/UL (ref 0.8–3.5)
LYMPHOCYTES NFR BLD: 16 % (ref 12–49)
MCH RBC QN AUTO: 27.1 PG (ref 26–34)
MCHC RBC AUTO-ENTMCNC: 33.2 G/DL (ref 30–36.5)
MCV RBC AUTO: 81.5 FL (ref 80–99)
MONOCYTES # BLD: 0.9 K/UL (ref 0–1)
MONOCYTES NFR BLD: 8 % (ref 5–13)
NEUTS SEG # BLD: 9.4 K/UL (ref 1.8–8)
NEUTS SEG NFR BLD: 75 % (ref 32–75)
NRBC # BLD: 0 K/UL (ref 0–0.01)
NRBC BLD-RTO: 0 PER 100 WBC
PLATELET # BLD AUTO: 453 K/UL (ref 150–400)
PMV BLD AUTO: 9 FL (ref 8.9–12.9)
POTASSIUM SERPL-SCNC: 4.2 MMOL/L (ref 3.5–5.1)
RBC # BLD AUTO: 3.99 M/UL (ref 3.8–5.2)
SERVICE CMNT-IMP: ABNORMAL
SERVICE CMNT-IMP: NORMAL
SODIUM SERPL-SCNC: 134 MMOL/L (ref 136–145)
WBC # BLD AUTO: 12.6 K/UL (ref 3.6–11)

## 2022-10-17 PROCEDURE — 94762 N-INVAS EAR/PLS OXIMTRY CONT: CPT

## 2022-10-17 PROCEDURE — 80048 BASIC METABOLIC PNL TOTAL CA: CPT

## 2022-10-17 PROCEDURE — 82962 GLUCOSE BLOOD TEST: CPT

## 2022-10-17 PROCEDURE — 74011000250 HC RX REV CODE- 250: Performed by: STUDENT IN AN ORGANIZED HEALTH CARE EDUCATION/TRAINING PROGRAM

## 2022-10-17 PROCEDURE — 74011250637 HC RX REV CODE- 250/637: Performed by: STUDENT IN AN ORGANIZED HEALTH CARE EDUCATION/TRAINING PROGRAM

## 2022-10-17 PROCEDURE — 74011250637 HC RX REV CODE- 250/637: Performed by: INTERNAL MEDICINE

## 2022-10-17 PROCEDURE — 74011250637 HC RX REV CODE- 250/637: Performed by: FAMILY MEDICINE

## 2022-10-17 PROCEDURE — 94640 AIRWAY INHALATION TREATMENT: CPT

## 2022-10-17 PROCEDURE — 74011636637 HC RX REV CODE- 636/637: Performed by: INTERNAL MEDICINE

## 2022-10-17 PROCEDURE — 36415 COLL VENOUS BLD VENIPUNCTURE: CPT

## 2022-10-17 PROCEDURE — 74011000250 HC RX REV CODE- 250: Performed by: FAMILY MEDICINE

## 2022-10-17 PROCEDURE — 85025 COMPLETE CBC W/AUTO DIFF WBC: CPT

## 2022-10-17 PROCEDURE — 74011636637 HC RX REV CODE- 636/637: Performed by: STUDENT IN AN ORGANIZED HEALTH CARE EDUCATION/TRAINING PROGRAM

## 2022-10-17 RX ORDER — ATORVASTATIN CALCIUM 40 MG/1
40 TABLET, FILM COATED ORAL DAILY
Qty: 30 TABLET | Refills: 0 | Status: SHIPPED | OUTPATIENT
Start: 2022-10-18 | End: 2022-11-17

## 2022-10-17 RX ORDER — PREDNISONE 10 MG/1
20 TABLET ORAL
Qty: 8 TABLET | Refills: 0 | Status: SHIPPED | OUTPATIENT
Start: 2022-10-18 | End: 2022-10-22

## 2022-10-17 RX ORDER — CARVEDILOL 12.5 MG/1
12.5 TABLET ORAL 2 TIMES DAILY WITH MEALS
Qty: 60 TABLET | Refills: 0 | Status: SHIPPED | OUTPATIENT
Start: 2022-10-17 | End: 2022-11-16

## 2022-10-17 RX ADMIN — IPRATROPIUM BROMIDE AND ALBUTEROL SULFATE 3 ML: .5; 3 SOLUTION RESPIRATORY (INHALATION) at 08:58

## 2022-10-17 RX ADMIN — PREDNISONE 40 MG: 20 TABLET ORAL at 09:08

## 2022-10-17 RX ADMIN — IPRATROPIUM BROMIDE AND ALBUTEROL SULFATE 3 ML: .5; 3 SOLUTION RESPIRATORY (INHALATION) at 13:47

## 2022-10-17 RX ADMIN — ATORVASTATIN CALCIUM 40 MG: 40 TABLET, FILM COATED ORAL at 09:08

## 2022-10-17 RX ADMIN — OXYCODONE HYDROCHLORIDE AND ACETAMINOPHEN 500 MG: 500 TABLET ORAL at 09:09

## 2022-10-17 RX ADMIN — SODIUM CHLORIDE, PRESERVATIVE FREE 10 ML: 5 INJECTION INTRAVENOUS at 14:00

## 2022-10-17 RX ADMIN — DULOXETINE HYDROCHLORIDE 60 MG: 60 CAPSULE, DELAYED RELEASE ORAL at 09:12

## 2022-10-17 RX ADMIN — BUSPIRONE HYDROCHLORIDE 10 MG: 5 TABLET ORAL at 09:08

## 2022-10-17 RX ADMIN — CARVEDILOL 12.5 MG: 12.5 TABLET, FILM COATED ORAL at 09:09

## 2022-10-17 RX ADMIN — CYANOCOBALAMIN TAB 500 MCG 500 MCG: 500 TAB at 09:09

## 2022-10-17 RX ADMIN — HYDROCHLOROTHIAZIDE: 25 TABLET ORAL at 09:08

## 2022-10-17 RX ADMIN — Medication 3 UNITS: at 13:27

## 2022-10-17 NOTE — PROGRESS NOTES
Patient/family seen: YES       Informed patient/family of BPCI-A Bundle Program. Also advised of potential outreach by Care Transitions Team.    Bundle Payment Care Improvement Beneficiary Letter Delivered to Beneficiary or Representative:YES.  Date BCPI -A was given:10/17/22

## 2022-10-17 NOTE — TELEPHONE ENCOUNTER
----- Message from Britney Brink sent at 10/17/2022  2:42 PM EDT -----  Subject: Hospital Follow Up    QUESTIONS  What hospital was the Patient Discharged from? [facility]  Date of Discharge? 2022-10-17  Discharge Location? Home  Reason for hospitalization as patient stated? Acute delirium   What question does the patient have, if applicable? N/A  ---------------------------------------------------------------------------  --------------  CALL BACK INFO  What is the best way for the office to contact you? OK to leave message on   voicemail  Preferred Call Back Phone Number? [phone]  ---------------------------------------------------------------------------  --------------  SCRIPT ANSWERS  Relationship to Patient? Third Party  Third Party Type? Hospital?   Representative Name?  Deann Simon

## 2022-10-17 NOTE — PROGRESS NOTES
Problem: General Medical Care Plan  Goal: *Skin integrity maintained  Outcome: Progressing Towards Goal     Problem: Diabetes Self-Management  Goal: *Disease process and treatment process  Description: Define diabetes and identify own type of diabetes; list 3 options for treating diabetes. Outcome: Progressing Towards Goal     Problem: Falls - Risk of  Goal: *Absence of Falls  Description: Document Serina Fuad Fall Risk and appropriate interventions in the flowsheet.   Outcome: Resolved/Met  Note: Fall Risk Interventions:  Mobility Interventions: Patient to call before getting OOB, PT Consult for mobility concerns         Medication Interventions: Patient to call before getting OOB, Teach patient to arise slowly    Elimination Interventions: Call light in reach    History of Falls Interventions: Bed/chair exit alarm, Room close to nurse's station

## 2022-10-17 NOTE — NURSE NAVIGATOR
HEART FAILURE NURSE NAVIGATOR NOTE  [address] [place] [address]    Patient chart was reviewed by Heart Failure (HF) Nurse Navigators for compliance of prescribed treatment with guidelines directed medical therapy (GDMT) and HF database completed. Please, review beneath recommendations for symptomatic patients with HF with Preserved Ejection Fraction ? 50% (HFpEF) for your consideration when taking care of this patient. Current HF Medical Therapy:      Name Luisa Hernández    1940   LVEF 70/75%   NYHA Functional Class Documentation needed   ARNi/ACEi/ARB    Aldosterone Antagonist    SGLT2 inhibitor    Consulting Cardiologist      Recommendations for HF Management:    For patients with HFpEF ? 50%, consider adding the following GDMT, if appropriate:  SGLT2 inhibitor [Class 2a]  ARNi or ARB [Class 2b]  Aldosterone antagonist [Class 2b]  Adjust antihypertensive therapy [Class 1]  Adjust diuretic dose at discharge if hospitalized for volume overload [Class 1]  For patient with hyperkalemia while on RAASi > 5.5, consider adding potassium binders (patiromer, sodium zirconium cycosilicate) [Class 2a]    Patients with suspected cardiac amyloidosis (older > 61years old with LVH > 1.2cm and/or any other signs of amyloidosis) should be offered screening labs and imaging [Class 1]: (a) serum gammopathy profile and UPEP with immunofixation, and (b) PYP test. If PYP test is positive patient should have genetic testing done for inherited ATTR amyloidosis. If any findings are positive or you need genetic testing ordered, please, consider in-patient consultation or referral to [address] [place] [address]. Note that the following medications may be potentially harmful in heart failure [Class 3].   Calcium channel blockers (doxazosin, diltiazem, verapamil, nifedipine)  Antiarrhythmics (flecanide, disopyrimide, sotalol, dronedarone)  Diabetes medications (thiasolidinediones, saxagliptin, alogliptin)  NSAIDs and STINSON 2 inhibitors    Consider vaccinations for respiratory illnesses (flu, pneumonia, covid) [Class 2b]        Patient Heart Failure Education:     Teach back in heart failure education provided, including information about medical therapy, lifestyle modifications, diet and fluid restrictions, physical activity. Educational resources provided: Living with Heart Failure booklet; Signs/Symptoms magnet; Weight Calendar; [facility] flyer; Preparing for Successful Discharge check list.  Information provided about HF support group. Heart failure avoiding triggers on discharge instructions. Plan for Transitional Care:    Post discharge follow up phone call to be made within 48-72 hours of discharge. Patient will follow-up with PCP. Patient will follow-up with Primary Cardiologist.  Patient screened for obstructive sleep apnea and referred to Sleep Medicine. Referral/follow-up with Cardiac Rehabilitation. Referral/follow-up with Advanced Heart Failure Specialist.  Referral/follow-up with Palliative Care Specialist.        Heart Failure Nurse Navigator  Phone: [phone] / [phone]    *Recommendations listed above are based on the guidelines: 2022 AHA/ACC/HFSA Guideline for the Management of Heart Failure: A Report of the [address] on Clinical Practice Guidelines. Circulation 2022; [id number]. and 2017 AHA/ACC/HRS guideline for management of patients with ventricular arrhythmias and the prevention of sudden cardiac death: A Report of the [employer] of Cardiology/American Heart Association Task Force on Clinical Practice Guidelines and the Heart Rhythm Society.  Heart Rhythm, Vol 15, No 10, October 2018 *Class of Recommendation: Class 1 (strong), Class 2a (moderate), Class 2b (weak), Class 3 (not recommended, potentially harmful)

## 2022-10-17 NOTE — NURSE NAVIGATOR
[facility] referral sent. Patient agreeable to Jim Goldsmith per care manager. Information on After Visit Summary for visit 10/19/22.

## 2022-10-17 NOTE — PROGRESS NOTES
Attempted to schedule hospital follow up PCP appointment. Sent a message to PCP office to find patient an appointment. Awaiting callback from PCP office.  Travis Rollins, Care Management Assistant

## 2022-10-17 NOTE — PROGRESS NOTES
Pulmonary, Critical Care, and Sleep Medicine~Progress Note    Name: Tanya Pantoja MRN: [id number]   : 1940 Hospital: [place] [address]   Date: 10/17/2022 10:57 AM Admission: 10/13/2022     Impression Plan   Dyspnea   Decom HF  Wheeze/rhonchi   HTN urgency   MI. S/p PCI  DM II  Never smoker  Benefit from steroids, suggest 7 day taper. Query undx asthma? O2 titration above 90%  Duonebs as needed  We will follow up on outpatient with asthma work up  Stable from a pulmonary aspect      Daily Progression:    On room air  Breathing is better following steroids   Wants to go home   ECHO   Result status: Final result       Left Ventricle: Hyperdynamic left ventricular systolic function with a visually estimated EF of 70 - 75%. Left ventricle is dilated. Normal wall thickness. Normal wall motion. Diastolic dysfunction present with normal LV EF. Aortic Valve: Tricuspid valve. Technical qualifiers: Echo study was technically difficult with poor endocardial visualization, a technically difficult Doppler study and technically difficult due to low parasternal window. I have reviewed the labs and previous days notes. Pertinent items are noted in HPI. OBJECTIVE:     Vital Signs:     Visit Vitals  BP (!) 103/59 (BP 1 Location: Right upper arm, BP Patient Position: Supine)   Pulse (!) 52   Temp 97.4 °F (36.3 °C)   Resp 14   Ht 5' (1.524 m)   Wt 72 kg (158 lb 11.7 oz)   SpO2 92%   BMI 31.00 kg/m²      Temp (24hrs), Av °F (36.7 °C), Min:97.4 °F (36.3 °C), Max:99 °F (37.2 °C)     Intake/Output:     Last shift: No intake/output data recorded.     Last 3 shifts: 10/15 1901 - 10/17 0700  In: 1080 [P.O.:1080]  Out: -         Intake/Output Summary (Last 24 hours) at 10/17/2022 1057  Last data filed at 10/16/2022 1958  Gross per 24 hour   Intake 360 ml   Output --   Net 360 ml       Physical Exam:                                        Exam Findings Other   General: No resp distress noted, appears stated age    [de-identified]:  No ulcers, JVD not elevated, no cervical LAD    Chest: No pectus deformity, normal chest rise b/l    HEART:  RRR, no murmurs/rubs/gallops    Lungs:  CTA b/l, no rhonchi/crackles/wheeze, diminished BS at bases    ABD: Soft/NT, non rigid mildly distended    EXT: No cyanosis/clubbing/edema, normal peripheral pulses    Skin: No rashes or ulcers, no mottling    Neuro: A/O x 3        Medications:  Current Facility-Administered Medications   Medication Dose Route Frequency    albuterol-ipratropium (DUO-NEB) 2.5 MG-0.5 MG/3 ML  3 mL Nebulization Q6H RT    predniSONE (DELTASONE) tablet 40 mg  40 mg Oral DAILY WITH BREAKFAST    ascorbic acid (vitamin C) (VITAMIN C) tablet 500 mg  500 mg Oral DAILY    busPIRone (BUSPAR) tablet 10 mg  10 mg Oral TID    cyanocobalamin (VITAMIN B12) tablet 500 mcg  500 mcg Oral DAILY    DULoxetine (CYMBALTA) capsule 60 mg  60 mg Oral DAILY    sodium chloride (NS) flush 5-40 mL  5-40 mL IntraVENous Q8H    sodium chloride (NS) flush 5-40 mL  5-40 mL IntraVENous PRN    acetaminophen (TYLENOL) tablet 650 mg  650 mg Oral Q6H PRN    Or    acetaminophen (TYLENOL) suppository 650 mg  650 mg Rectal Q6H PRN    polyethylene glycol (MIRALAX) packet 17 g  17 g Oral DAILY PRN    ondansetron (ZOFRAN ODT) tablet 4 mg  4 mg Oral Q8H PRN    Or    ondansetron (ZOFRAN) injection 4 mg  4 mg IntraVENous Q6H PRN    hydrALAZINE (APRESOLINE) 20 mg/mL injection 20 mg  20 mg IntraVENous Q6H PRN    atorvastatin (LIPITOR) tablet 40 mg  40 mg Oral DAILY    traZODone (DESYREL) tablet 100 mg  100 mg Oral QHS    glucose chewable tablet 16 g  4 Tablet Oral PRN    glucagon (GLUCAGEN) injection 1 mg  1 mg IntraMUSCular PRN    sodium chloride (NS) flush 5-40 mL  5-40 mL IntraVENous Q8H    sodium chloride (NS) flush 5-40 mL  5-40 mL IntraVENous PRN    polyethylene glycol (MIRALAX) packet 17 g  17 g Oral DAILY PRN    dextrose 10 % infusion 0-250 mL  0-250 good balance mL IntraVENous PRN    insulin lispro (HUMALOG) injection   SubCUTAneous AC&HS    losartan/hydroCHLOROthiazide (HYZAAR) 50/12.5 mg   Oral DAILY    carvediloL (COREG) tablet 12.5 mg  12.5 mg Oral BID WITH MEALS       Labs:  ABG Recent Labs     10/14/22  1543   PHI 7.49*   PCO2I 32.0*   PO2I 79*   HCO3I 24.5   SO2I 96.7        CBC Recent Labs     10/17/22  0515 10/16/22  0411 10/15/22  1155   WBC 12.6* 11.8* 9.9   HGB 10.8* 10.9* 11.8   HCT 32.5* 32.9* 35.3   * 473* 445*   MCV 81.5 83.1 81.9   MCH 27.1 27.5 42.1        Metabolic  Panel Recent Labs     10/17/22  0515 10/16/22  0411 10/15/22  1155   * 135* 135*   K 4.2 4.0 3.9    102 103   CO2 24 25 28   * 124* 198*   BUN 31* 25* 19   CREA 1.25* 1.16* 1.27*   CA 8.4* 8.7 8.8        Pertinent Labs                Dez Marie PA-C  10/17/2022

## 2022-10-17 NOTE — DISCHARGE SUMMARY
Physician Discharge Summary     Patient ID:    Juan Chanel  [id number]  1940    Admit date: 10/13/2022    Discharge date and time: 10/17/2022    Hospital Diagnoses and Treatment Rendered:   Dyspnea / Pulm HTN  - No overt clinical signs of decompensated heart failure on exam  - Dyspnea possibly secondary to worsening pulmonary hypertension v hypertensive urgency  - ECHO 10/14 w/ EF 27-96%,  with diastolic dysfunction with normal LV EF   - multiple CXR without obvious pathology or cause of dyspnea. COVID negative  - Given diastolic dysfunction, s/p lasix x 1 dose yesterday,mild bump in Cr and BUN  - Pulm consulted, appreciate recs  - Pt to follow up with pulmonology as outpatient  - Steroid taper 20mg x 2 days followed by 10mg x 2 days then stop taking     Hypertensive urgency, improving   - Likely due to medication noncompliance  - Resume losartan/HCTZ and Coreg  - Follow up with cardiology outpatient  - Orthostatic BP measurement taken prior to discharge, no drop in BP    Bilateral adrenal nodules  - Noted on CT.  With recommended dedicated imaging   - Plan for OP endo follow-up, may be contributing to her HTN      CAD status post PCI  - continue Coreg and continue Lipitor  - Obtain lipid panel, TSH     NIDDM II  -SSI +Hypoglycemic protocols  - Repeat A1c     Major depressive disorder /generalized anxiety disorder  - Continue home BuSpar and Cymbalta     Obesity  - Counseled on weight loss, dieting and exercise    Chronic Diagnoses:    Problem List as of 10/17/2022 Date Reviewed: 8/8/2011            Codes Class Noted - Resolved    Acute on chronic congestive heart failure, unspecified heart failure type ([facility] ICD-10-CM: I50.9  ICD-9-CM: 428.0  10/13/2022 - Present        Cervical stenosis of spine ICD-10-CM: M48.02  ICD-9-CM: 723.0  8/8/2011 - Present        Nonspecific abnormal electrocardiogram (ECG) (EKG) ICD-10-CM: R94.31  ICD-9-CM: 794.31  4/6/2011 - Present Essential hypertension, malignant ICD-10-CM: I10  ICD-9-CM: 401.0  4/6/2011 - Present        Pure hypercholesterolemia ICD-10-CM: E78.00  ICD-9-CM: 272.0  4/6/2011 - Present        Type II or unspecified type diabetes mellitus without mention of complication, not stated as uncontrolled ICD-10-CM: E11.9  ICD-9-CM: 250.00  4/6/2011 - Present        RESOLVED: Pre-operative cardiovascular examination ICD-10-CM: Z01.810  ICD-9-CM: V72.81  4/6/2011 - 10/2/2019           Discharge Medications:   Current Discharge Medication List        START taking these medications    Details   !! atorvastatin (LIPITOR) 40 mg tablet Take 1 Tablet by mouth daily for 30 days. Qty: 30 Tablet, Refills: 0  Start date: 10/18/2022, End date: 11/17/2022      predniSONE (DELTASONE) 10 mg tablet Take 20 mg by mouth daily (with breakfast) for 4 days. Take 20mg daily for two days. Then take 10mg daily for two days then stop taking  Indications: worsening asthma  Qty: 8 Tablet, Refills: 0  Start date: 10/18/2022, End date: 10/22/2022       !! - Potential duplicate medications found. Please discuss with provider. CONTINUE these medications which have CHANGED    Details   carvediloL (COREG) 12.5 mg tablet Take 1 Tablet by mouth two (2) times daily (with meals) for 30 days. Qty: 60 Tablet, Refills: 0  Start date: 10/17/2022, End date: 11/16/2022           CONTINUE these medications which have NOT CHANGED    Details   !! atorvastatin (LIPITOR) 40 mg tablet Take 40 mg by mouth daily. dilTIAZem ER (TIAZAC) 240 mg capsule Take 240 mg by mouth daily. olmesartan-hydroCHLOROthiazide (Benicar HCT) 40-12.5 mg per tablet Take 1 Tablet by mouth. COD LIVER OIL PO Take  by mouth daily. !! ascorbic acid (VITAMIN C) 500 mg tablet Take  by mouth daily. !! ERGOCALCIFEROL, VITAMIN D2, (VITAMIN D PO) Take  by mouth daily. busPIRone (BUSPAR) 10 mg tablet Take 10 mg by mouth three (3) times daily.       exenatide (BYETTA) 10 mcg/0.04 mL PnIj injection by SubCUTAneous route. DULOXETINE HCL (CYMBALTA PO) Take 50 mg by mouth daily. metformin (GLUCOPHAGE) 500 mg tablet Take  by mouth two (2) times daily (with meals). MULTIVITAMIN PO Take  by mouth daily. propoxyphene napsylate-acetaminophen (DARVOCET-N 100) 100-650 mg per tablet Take 1 Tab by mouth. Every 4 hours as needed      trazodone (DESYREL) 100 mg tablet Take  by mouth nightly. Take two tablets at bedtime      VITAMIN A PO Take  by mouth daily. CYANOCOBALAMIN, VITAMIN B-12, (VITAMIN B-12 PO) Take  by mouth daily. !! ASCORBATE CALCIUM (VITAMIN C PO) Take  by mouth daily. !! ergocalciferol (VITAMIN D) 50,000 unit capsule Take 50,000 Units by mouth. Once a week       VITAMIN E ACETATE (VITAMIN E PO) Take  by mouth daily. !! - Potential duplicate medications found. Please discuss with provider. STOP taking these medications       azithromycin (ZITHROMAX) 250 mg tablet Comments:   Reason for Stopping:         simvastatin (ZOCOR) 10 mg tablet Comments:   Reason for Stopping: Follow up Care:    1. None in 1-2 weeks. Please call to set up an appointment shortly after discharge. Diet:  Regular Diet    Disposition:  Home. Advanced Directive:   FULL x   DNR      Discharge Exam:  Constitutional:  No acute distress, cooperative, pleasant    ENT:  Oral mucosa moist, oropharynx benign. Resp:  CTA bilaterally. No wheezing/rhonchi/rales. No accessory muscle use. CV:  Regular rhythm, normal rate, no murmurs, gallops, rubs    GI:  Soft, non distended, non tender. normoactive bowel sounds, no hepatosplenomegaly     Musculoskeletal:  No edema, warm, 2+ pulses throughout    Neurologic:  Moves all extremities. AAOx3,no focal deficit       CONSULTATIONS: Cardiology and Pulmonology    Significant Diagnostic Studies:   10/13/2022: BUN 8 MG/DL (Ref range: 6 - 20 MG/DL);  Calcium 9.1 MG/DL (Ref range: 8.5 - 10.1 MG/DL); CO2 28 mmol/L (Ref range: 21 - 32 mmol/L); Creatinine 1.19 MG/DL (H; Ref range: 0.55 - 1.02 MG/DL); Glucose 193 mg/dL (H; Ref range: 65 - 100 mg/dL); HCT 39.4 % (Ref range: 35.0 - 47.0 %); HGB 12.4 g/dL (Ref range: 11.5 - 16.0 g/dL); Potassium 3.1 mmol/L (L; Ref range: 3.5 - 5.1 mmol/L); Sodium 138 mmol/L (Ref range: 136 - 145 mmol/L)  10/14/2022: BUN 11 MG/DL (Ref range: 6 - 20 MG/DL); Calcium 8.8 MG/DL (Ref range: 8.5 - 10.1 MG/DL); CO2 26 mmol/L (Ref range: 21 - 32 mmol/L); Creatinine 0.99 MG/DL (Ref range: 0.55 - 1.02 MG/DL); Glucose 157 mg/dL (H; Ref range: 65 - 100 mg/dL); HCT 36.3 % (Ref range: 35.0 - 47.0 %); HGB 11.8 g/dL (Ref range: 11.5 - 16.0 g/dL); Potassium 3.7 mmol/L (Ref range: 3.5 - 5.1 mmol/L); Sodium 136 mmol/L (Ref range: 136 - 145 mmol/L)  Recent Labs     10/17/22  0515 10/16/22  0411   WBC 12.6* 11.8*   HGB 10.8* 10.9*   HCT 32.5* 32.9*   * 473*     Recent Labs     10/17/22  0515 10/16/22  0411 10/15/22  1155   * 135* 135*   K 4.2 4.0 3.9    102 103   CO2 24 25 28   BUN 31* 25* 19   CREA 1.25* 1.16* 1.27*   * 124* 198*   CA 8.4* 8.7 8.8     No results for input(s): AP, TBIL, TP, ALB, GLOB, GGT, AML, LPSE in the last 72 hours. No lab exists for component: SGOT, GPT, AMYP, HLPSE  No results for input(s): INR, PTP, APTT, INREXT, INREXT in the last 72 hours. No results for input(s): FE, TIBC, PSAT, FERR in the last 72 hours. No results for input(s): PH, PCO2, PO2 in the last 72 hours. No results for input(s): CPK, CKMB in the last 72 hours.     No lab exists for component: TROPONINI  No components found for: [place]          Signed:  Gloria Gomez PA-C  10/17/2022  11:51 AM  .

## 2022-10-17 NOTE — PROGRESS NOTES
Transition of Care: Plan for discharge home today. Patient lives alone and stated she is independent with ADLs. Noted no availability for new PCP follow-up until mid-November. Patient agreeable to [facility]. CM attempted to call [facility] to schedule appointment, however they requested detailed clinical information and would not move forward with scheduling appointment. CM was unable to secure appointment and notified nurse navigator for assistance. Noted Follow up scheduled with [facility] with Dr. Leticia Baer is 10/27/22 at 12:30pm .  Patient stated that she has someone coming to pick her up from the hospital.    Chart reviewed. CM met with patient at bedside. Medicare pt has received, reviewed, and signed 2nd IM letter informing them of their right to appeal the discharge. Signed copy has been placed on pt bedside chart.     Corrinne Heft, BSW/ELEONORA

## 2022-10-17 NOTE — NURSE NAVIGATOR
Follow up scheduled with [facility] for follow up. First available appointment with Dr. Dean Drake is 10/27/22 at 12:30pm Information placed on After Visit Summary. Left message for care manager to request [facility] if patient is in agreement. Care management specialist note indicates new patient appointment not available till mid November.

## 2022-10-18 NOTE — TELEPHONE ENCOUNTER
Returned call to discharge planner. No answer  Left voice message to return call. Also left on message that new patient appointments are probably 3-4 weeks out depending on who she wants to see.

## 2022-10-20 LAB
ALDOST SERPL-MCNC: 1.5 NG/DL (ref 0–30)
RENIN PLAS-CCNC: 1.38 NG/ML/HR (ref 0.17–5.38)

## 2022-12-21 ENCOUNTER — APPOINTMENT (OUTPATIENT)
Dept: GENERAL RADIOLOGY | Age: 82
DRG: 522 | End: 2022-12-21
Attending: STUDENT IN AN ORGANIZED HEALTH CARE EDUCATION/TRAINING PROGRAM
Payer: MEDICARE

## 2022-12-21 ENCOUNTER — APPOINTMENT (OUTPATIENT)
Dept: CT IMAGING | Age: 82
DRG: 522 | End: 2022-12-21
Attending: STUDENT IN AN ORGANIZED HEALTH CARE EDUCATION/TRAINING PROGRAM
Payer: MEDICARE

## 2022-12-21 ENCOUNTER — HOSPITAL ENCOUNTER (INPATIENT)
Age: 82
LOS: 6 days | Discharge: NURSING, REHAB OR LONG-TERM CARE | DRG: 522 | End: 2022-12-27
Attending: STUDENT IN AN ORGANIZED HEALTH CARE EDUCATION/TRAINING PROGRAM | Admitting: STUDENT IN AN ORGANIZED HEALTH CARE EDUCATION/TRAINING PROGRAM
Payer: MEDICARE

## 2022-12-21 DIAGNOSIS — S72.001A CLOSED FRACTURE OF RIGHT HIP, INITIAL ENCOUNTER (HCC): Primary | ICD-10-CM

## 2022-12-21 PROBLEM — S72.009A HIP FRACTURE (HCC): Status: ACTIVE | Noted: 2022-12-21

## 2022-12-21 LAB
ABO + RH BLD: NORMAL
ALBUMIN SERPL-MCNC: 3.8 G/DL (ref 3.5–5)
ALBUMIN/GLOB SERPL: 1.1 {RATIO} (ref 1.1–2.2)
ALP SERPL-CCNC: 102 U/L (ref 45–117)
ALT SERPL-CCNC: 25 U/L (ref 12–78)
ANION GAP SERPL CALC-SCNC: 14 MMOL/L (ref 5–15)
AST SERPL-CCNC: 35 U/L (ref 15–37)
BASOPHILS # BLD: 0.1 K/UL (ref 0–0.1)
BASOPHILS NFR BLD: 1 % (ref 0–1)
BILIRUB SERPL-MCNC: 0.5 MG/DL (ref 0.2–1)
BLOOD GROUP ANTIBODIES SERPL: NORMAL
BUN SERPL-MCNC: 13 MG/DL (ref 6–20)
BUN/CREAT SERPL: 12 (ref 12–20)
CALCIUM SERPL-MCNC: 9.3 MG/DL (ref 8.5–10.1)
CHLORIDE SERPL-SCNC: 102 MMOL/L (ref 97–108)
CO2 SERPL-SCNC: 25 MMOL/L (ref 21–32)
CREAT SERPL-MCNC: 1.07 MG/DL (ref 0.55–1.02)
DIFFERENTIAL METHOD BLD: ABNORMAL
EOSINOPHIL # BLD: 0 K/UL (ref 0–0.4)
EOSINOPHIL NFR BLD: 0 % (ref 0–7)
ERYTHROCYTE [DISTWIDTH] IN BLOOD BY AUTOMATED COUNT: 18 % (ref 11.5–14.5)
GLOBULIN SER CALC-MCNC: 3.5 G/DL (ref 2–4)
GLUCOSE SERPL-MCNC: 154 MG/DL (ref 65–100)
HCT VFR BLD AUTO: 38.5 % (ref 35–47)
HGB BLD-MCNC: 12.5 G/DL (ref 11.5–16)
IMM GRANULOCYTES # BLD AUTO: 0.1 K/UL (ref 0–0.04)
IMM GRANULOCYTES NFR BLD AUTO: 0 % (ref 0–0.5)
INR PPP: 1 (ref 0.9–1.1)
LYMPHOCYTES # BLD: 1 K/UL (ref 0.8–3.5)
LYMPHOCYTES NFR BLD: 7 % (ref 12–49)
MCH RBC QN AUTO: 25.7 PG (ref 26–34)
MCHC RBC AUTO-ENTMCNC: 32.5 G/DL (ref 30–36.5)
MCV RBC AUTO: 79.1 FL (ref 80–99)
MONOCYTES # BLD: 0.9 K/UL (ref 0–1)
MONOCYTES NFR BLD: 6 % (ref 5–13)
NEUTS SEG # BLD: 12.2 K/UL (ref 1.8–8)
NEUTS SEG NFR BLD: 86 % (ref 32–75)
NRBC # BLD: 0 K/UL (ref 0–0.01)
NRBC BLD-RTO: 0 PER 100 WBC
PLATELET # BLD AUTO: 390 K/UL (ref 150–400)
PMV BLD AUTO: 9.9 FL (ref 8.9–12.9)
POTASSIUM SERPL-SCNC: 3 MMOL/L (ref 3.5–5.1)
PROT SERPL-MCNC: 7.3 G/DL (ref 6.4–8.2)
PROTHROMBIN TIME: 9.6 SEC (ref 9–11.1)
RBC # BLD AUTO: 4.87 M/UL (ref 3.8–5.2)
SODIUM SERPL-SCNC: 141 MMOL/L (ref 136–145)
SPECIMEN EXP DATE BLD: NORMAL
WBC # BLD AUTO: 14.3 K/UL (ref 3.6–11)

## 2022-12-21 PROCEDURE — 96374 THER/PROPH/DIAG INJ IV PUSH: CPT

## 2022-12-21 PROCEDURE — 73562 X-RAY EXAM OF KNEE 3: CPT

## 2022-12-21 PROCEDURE — 73552 X-RAY EXAM OF FEMUR 2/>: CPT

## 2022-12-21 PROCEDURE — 73502 X-RAY EXAM HIP UNI 2-3 VIEWS: CPT

## 2022-12-21 PROCEDURE — 85025 COMPLETE CBC W/AUTO DIFF WBC: CPT

## 2022-12-21 PROCEDURE — 74011250637 HC RX REV CODE- 250/637: Performed by: STUDENT IN AN ORGANIZED HEALTH CARE EDUCATION/TRAINING PROGRAM

## 2022-12-21 PROCEDURE — 99285 EMERGENCY DEPT VISIT HI MDM: CPT

## 2022-12-21 PROCEDURE — 36415 COLL VENOUS BLD VENIPUNCTURE: CPT

## 2022-12-21 PROCEDURE — 73564 X-RAY EXAM KNEE 4 OR MORE: CPT

## 2022-12-21 PROCEDURE — 70450 CT HEAD/BRAIN W/O DYE: CPT

## 2022-12-21 PROCEDURE — 74011250636 HC RX REV CODE- 250/636: Performed by: STUDENT IN AN ORGANIZED HEALTH CARE EDUCATION/TRAINING PROGRAM

## 2022-12-21 PROCEDURE — 65270000046 HC RM TELEMETRY

## 2022-12-21 PROCEDURE — 71045 X-RAY EXAM CHEST 1 VIEW: CPT

## 2022-12-21 PROCEDURE — 96375 TX/PRO/DX INJ NEW DRUG ADDON: CPT

## 2022-12-21 PROCEDURE — 85610 PROTHROMBIN TIME: CPT

## 2022-12-21 PROCEDURE — 74011250637 HC RX REV CODE- 250/637: Performed by: PHYSICIAN ASSISTANT

## 2022-12-21 PROCEDURE — 86900 BLOOD TYPING SEROLOGIC ABO: CPT

## 2022-12-21 PROCEDURE — 80053 COMPREHEN METABOLIC PANEL: CPT

## 2022-12-21 RX ORDER — MORPHINE SULFATE 2 MG/ML
1-2 INJECTION, SOLUTION INTRAMUSCULAR; INTRAVENOUS
Status: DISCONTINUED | OUTPATIENT
Start: 2022-12-21 | End: 2022-12-22

## 2022-12-21 RX ORDER — ASPIRIN 81 MG/1
TABLET ORAL DAILY
COMMUNITY
End: 2022-12-27

## 2022-12-21 RX ORDER — MORPHINE SULFATE 4 MG/ML
4 INJECTION INTRAVENOUS ONCE
Status: COMPLETED | OUTPATIENT
Start: 2022-12-21 | End: 2022-12-21

## 2022-12-21 RX ORDER — HYDROCODONE BITARTRATE AND ACETAMINOPHEN 5; 325 MG/1; MG/1
1 TABLET ORAL ONCE
Status: COMPLETED | OUTPATIENT
Start: 2022-12-21 | End: 2022-12-21

## 2022-12-21 RX ORDER — OXYCODONE HYDROCHLORIDE 5 MG/1
5 TABLET ORAL
Status: DISCONTINUED | OUTPATIENT
Start: 2022-12-21 | End: 2022-12-22

## 2022-12-21 RX ORDER — SODIUM CHLORIDE, SODIUM LACTATE, POTASSIUM CHLORIDE, CALCIUM CHLORIDE 600; 310; 30; 20 MG/100ML; MG/100ML; MG/100ML; MG/100ML
75 INJECTION, SOLUTION INTRAVENOUS CONTINUOUS
Status: DISCONTINUED | OUTPATIENT
Start: 2022-12-21 | End: 2022-12-22

## 2022-12-21 RX ORDER — OXYCODONE HYDROCHLORIDE 5 MG/1
2.5 TABLET ORAL
Status: DISCONTINUED | OUTPATIENT
Start: 2022-12-21 | End: 2022-12-22

## 2022-12-21 RX ORDER — ONDANSETRON 2 MG/ML
4 INJECTION INTRAMUSCULAR; INTRAVENOUS
Status: DISCONTINUED | OUTPATIENT
Start: 2022-12-21 | End: 2022-12-22 | Stop reason: SDUPTHER

## 2022-12-21 RX ORDER — ACETAMINOPHEN 325 MG/1
650 TABLET ORAL EVERY 6 HOURS
Status: DISCONTINUED | OUTPATIENT
Start: 2022-12-22 | End: 2022-12-22

## 2022-12-21 RX ORDER — ONDANSETRON 2 MG/ML
4 INJECTION INTRAMUSCULAR; INTRAVENOUS ONCE
Status: COMPLETED | OUTPATIENT
Start: 2022-12-21 | End: 2022-12-21

## 2022-12-21 RX ADMIN — SODIUM CHLORIDE, POTASSIUM CHLORIDE, SODIUM LACTATE AND CALCIUM CHLORIDE 75 ML/HR: 600; 310; 30; 20 INJECTION, SOLUTION INTRAVENOUS at 20:11

## 2022-12-21 RX ADMIN — ONDANSETRON HYDROCHLORIDE 4 MG: 2 SOLUTION INTRAMUSCULAR; INTRAVENOUS at 20:02

## 2022-12-21 RX ADMIN — ACETAMINOPHEN 650 MG: 325 TABLET ORAL at 23:25

## 2022-12-21 RX ADMIN — HYDROCODONE BITARTRATE AND ACETAMINOPHEN 1 TABLET: 5; 325 TABLET ORAL at 18:19

## 2022-12-21 RX ADMIN — MORPHINE SULFATE 4 MG: 4 INJECTION, SOLUTION INTRAMUSCULAR; INTRAVENOUS at 20:03

## 2022-12-21 RX ADMIN — OXYCODONE 5 MG: 5 TABLET ORAL at 23:25

## 2022-12-21 NOTE — ED TRIAGE NOTES
81year old female pt comes to the ED via [facility] for a CC Fall. Pt states that she was at the gas station where she lost her balance and fell onto her right leg. Pt states no loc and no blood thinners. Pt is A&Ox4.

## 2022-12-22 ENCOUNTER — ANESTHESIA (OUTPATIENT)
Dept: SURGERY | Age: 82
DRG: 522 | End: 2022-12-22
Payer: MEDICARE

## 2022-12-22 ENCOUNTER — APPOINTMENT (OUTPATIENT)
Dept: GENERAL RADIOLOGY | Age: 82
DRG: 522 | End: 2022-12-22
Attending: NURSE PRACTITIONER
Payer: MEDICARE

## 2022-12-22 ENCOUNTER — APPOINTMENT (OUTPATIENT)
Dept: GENERAL RADIOLOGY | Age: 82
DRG: 522 | End: 2022-12-22
Attending: ORTHOPAEDIC SURGERY
Payer: MEDICARE

## 2022-12-22 ENCOUNTER — ANESTHESIA EVENT (OUTPATIENT)
Dept: SURGERY | Age: 82
DRG: 522 | End: 2022-12-22
Payer: MEDICARE

## 2022-12-22 LAB
ANION GAP BLD CALC-SCNC: 8 MMOL/L (ref 10–20)
CA-I BLD-MCNC: 1.08 MMOL/L (ref 1.12–1.32)
CHLORIDE BLD-SCNC: 106 MMOL/L (ref 98–107)
CO2 BLD-SCNC: 27.5 MMOL/L (ref 21–32)
CREAT BLD-MCNC: 1.1 MG/DL (ref 0.6–1.3)
GLUCOSE BLD STRIP.AUTO-MCNC: 120 MG/DL (ref 65–117)
GLUCOSE BLD STRIP.AUTO-MCNC: 139 MG/DL (ref 65–117)
GLUCOSE BLD STRIP.AUTO-MCNC: 142 MG/DL (ref 65–117)
GLUCOSE BLD STRIP.AUTO-MCNC: 145 MG/DL (ref 65–117)
GLUCOSE BLD-MCNC: 136 MG/DL (ref 65–100)
POTASSIUM BLD-SCNC: 4.8 MMOL/L (ref 3.5–5.1)
SERVICE CMNT-IMP: ABNORMAL
SODIUM BLD-SCNC: 140 MMOL/L (ref 136–145)

## 2022-12-22 PROCEDURE — 77030031139 HC SUT VCRL2 J&J -A: Performed by: ORTHOPAEDIC SURGERY

## 2022-12-22 PROCEDURE — 77030019905 HC CATH URETH INTMIT MDII -A: Performed by: ORTHOPAEDIC SURGERY

## 2022-12-22 PROCEDURE — 74011000258 HC RX REV CODE- 258: Performed by: ORTHOPAEDIC SURGERY

## 2022-12-22 PROCEDURE — 77030006802 HC BLD SAW RECIP BRSM -B: Performed by: ORTHOPAEDIC SURGERY

## 2022-12-22 PROCEDURE — 74011250636 HC RX REV CODE- 250/636: Performed by: ANESTHESIOLOGY

## 2022-12-22 PROCEDURE — 77030005513 HC CATH URETH FOL11 MDII -B

## 2022-12-22 PROCEDURE — C1776 JOINT DEVICE (IMPLANTABLE): HCPCS | Performed by: ORTHOPAEDIC SURGERY

## 2022-12-22 PROCEDURE — 77030005513 HC CATH URETH FOL11 MDII -B: Performed by: ORTHOPAEDIC SURGERY

## 2022-12-22 PROCEDURE — 77030008684 HC TU ET CUF COVD -B: Performed by: NURSE ANESTHETIST, CERTIFIED REGISTERED

## 2022-12-22 PROCEDURE — 76060000033 HC ANESTHESIA 1 TO 1.5 HR: Performed by: ORTHOPAEDIC SURGERY

## 2022-12-22 PROCEDURE — 77030041279 HC DRSG PRMSL AG MDII -B: Performed by: ORTHOPAEDIC SURGERY

## 2022-12-22 PROCEDURE — 77030010507 HC ADH SKN DERMBND J&J -B: Performed by: ORTHOPAEDIC SURGERY

## 2022-12-22 PROCEDURE — 77030013079 HC BLNKT BAIR HGGR 3M -A: Performed by: NURSE ANESTHETIST, CERTIFIED REGISTERED

## 2022-12-22 PROCEDURE — 77030019557 HC ELECTRD VES SEAL MEDT -F: Performed by: ORTHOPAEDIC SURGERY

## 2022-12-22 PROCEDURE — 74011250636 HC RX REV CODE- 250/636: Performed by: ORTHOPAEDIC SURGERY

## 2022-12-22 PROCEDURE — 82962 GLUCOSE BLOOD TEST: CPT

## 2022-12-22 PROCEDURE — 74011250636 HC RX REV CODE- 250/636: Performed by: STUDENT IN AN ORGANIZED HEALTH CARE EDUCATION/TRAINING PROGRAM

## 2022-12-22 PROCEDURE — 74011000250 HC RX REV CODE- 250: Performed by: STUDENT IN AN ORGANIZED HEALTH CARE EDUCATION/TRAINING PROGRAM

## 2022-12-22 PROCEDURE — 51798 US URINE CAPACITY MEASURE: CPT

## 2022-12-22 PROCEDURE — 2709999900 HC NON-CHARGEABLE SUPPLY: Performed by: ORTHOPAEDIC SURGERY

## 2022-12-22 PROCEDURE — 74011250636 HC RX REV CODE- 250/636: Performed by: PHYSICIAN ASSISTANT

## 2022-12-22 PROCEDURE — 72170 X-RAY EXAM OF PELVIS: CPT

## 2022-12-22 PROCEDURE — 74011000250 HC RX REV CODE- 250: Performed by: NURSE ANESTHETIST, CERTIFIED REGISTERED

## 2022-12-22 PROCEDURE — 80047 BASIC METABLC PNL IONIZED CA: CPT

## 2022-12-22 PROCEDURE — 77030006822 HC BLD SAW SAG BRSM -B: Performed by: ORTHOPAEDIC SURGERY

## 2022-12-22 PROCEDURE — 77030026438 HC STYL ET INTUB CARD -A: Performed by: NURSE ANESTHETIST, CERTIFIED REGISTERED

## 2022-12-22 PROCEDURE — 74011000250 HC RX REV CODE- 250: Performed by: ORTHOPAEDIC SURGERY

## 2022-12-22 PROCEDURE — 77030018547 HC SUT ETHBND1 J&J -B: Performed by: ORTHOPAEDIC SURGERY

## 2022-12-22 PROCEDURE — C9290 INJ, BUPIVACAINE LIPOSOME: HCPCS | Performed by: ORTHOPAEDIC SURGERY

## 2022-12-22 PROCEDURE — 74011250636 HC RX REV CODE- 250/636: Performed by: NURSE ANESTHETIST, CERTIFIED REGISTERED

## 2022-12-22 PROCEDURE — 65270000046 HC RM TELEMETRY

## 2022-12-22 PROCEDURE — 77030035236 HC SUT PDS STRATFX BARB J&J -B: Performed by: ORTHOPAEDIC SURGERY

## 2022-12-22 PROCEDURE — 74011250637 HC RX REV CODE- 250/637: Performed by: STUDENT IN AN ORGANIZED HEALTH CARE EDUCATION/TRAINING PROGRAM

## 2022-12-22 PROCEDURE — 74011250637 HC RX REV CODE- 250/637: Performed by: ORTHOPAEDIC SURGERY

## 2022-12-22 PROCEDURE — 99222 1ST HOSP IP/OBS MODERATE 55: CPT | Performed by: ORTHOPAEDIC SURGERY

## 2022-12-22 PROCEDURE — 73501 X-RAY EXAM HIP UNI 1 VIEW: CPT

## 2022-12-22 PROCEDURE — 76210000000 HC OR PH I REC 2 TO 2.5 HR: Performed by: ORTHOPAEDIC SURGERY

## 2022-12-22 PROCEDURE — 76010000161 HC OR TIME 1 TO 1.5 HR INTENSV-TIER 1: Performed by: ORTHOPAEDIC SURGERY

## 2022-12-22 PROCEDURE — 0SRR01A REPLACEMENT OF RIGHT HIP JOINT, FEMORAL SURFACE WITH METAL SYNTHETIC SUBSTITUTE, UNCEMENTED, OPEN APPROACH: ICD-10-PCS | Performed by: ORTHOPAEDIC SURGERY

## 2022-12-22 PROCEDURE — 77030011264 HC ELECTRD BLD EXT COVD -A: Performed by: ORTHOPAEDIC SURGERY

## 2022-12-22 PROCEDURE — 27236 TREAT THIGH FRACTURE: CPT | Performed by: ORTHOPAEDIC SURGERY

## 2022-12-22 PROCEDURE — 77030020788: Performed by: ORTHOPAEDIC SURGERY

## 2022-12-22 PROCEDURE — 77030002933 HC SUT MCRYL J&J -A: Performed by: ORTHOPAEDIC SURGERY

## 2022-12-22 DEVICE — CORAIL HIP SYSTEM CEMENTLESS FEMORAL STEM 12/14 AMT 135 DEGREES KHO SIZE 12 HA COATED HIGH OFFSET NO COLLAR
Type: IMPLANTABLE DEVICE | Site: HIP | Status: FUNCTIONAL
Brand: CORAIL

## 2022-12-22 DEVICE — SELF CENTERING BI-POLAR HEAD 28MM ID 45MM OD
Type: IMPLANTABLE DEVICE | Site: HIP | Status: FUNCTIONAL
Brand: SELF CENTERING

## 2022-12-22 DEVICE — IMPL CAPPED H6 HEMI UNI/BIPOLAR DEPUYSYNTHES: Type: IMPLANTABLE DEVICE | Status: FUNCTIONAL

## 2022-12-22 DEVICE — ARTICUL/EZE FEMORAL HEAD DIAMETER 28MM +1.5 12/14 TAPER
Type: IMPLANTABLE DEVICE | Site: HIP | Status: FUNCTIONAL
Brand: ARTICUL/EZE

## 2022-12-22 RX ORDER — SODIUM CHLORIDE 9 MG/ML
125 INJECTION, SOLUTION INTRAVENOUS CONTINUOUS
Status: DISPENSED | OUTPATIENT
Start: 2022-12-22 | End: 2022-12-23

## 2022-12-22 RX ORDER — POLYETHYLENE GLYCOL 3350 17 G/17G
17 POWDER, FOR SOLUTION ORAL DAILY
Status: DISCONTINUED | OUTPATIENT
Start: 2022-12-22 | End: 2022-12-27 | Stop reason: HOSPADM

## 2022-12-22 RX ORDER — GLYCOPYRROLATE 0.2 MG/ML
INJECTION INTRAMUSCULAR; INTRAVENOUS AS NEEDED
Status: DISCONTINUED | OUTPATIENT
Start: 2022-12-22 | End: 2022-12-22 | Stop reason: HOSPADM

## 2022-12-22 RX ORDER — MIDAZOLAM HYDROCHLORIDE 1 MG/ML
1 INJECTION, SOLUTION INTRAMUSCULAR; INTRAVENOUS AS NEEDED
Status: DISCONTINUED | OUTPATIENT
Start: 2022-12-22 | End: 2022-12-22 | Stop reason: HOSPADM

## 2022-12-22 RX ORDER — SODIUM CHLORIDE 0.9 % (FLUSH) 0.9 %
5-40 SYRINGE (ML) INJECTION EVERY 8 HOURS
Status: DISCONTINUED | OUTPATIENT
Start: 2022-12-22 | End: 2022-12-27 | Stop reason: HOSPADM

## 2022-12-22 RX ORDER — DULOXETIN HYDROCHLORIDE 30 MG/1
60 CAPSULE, DELAYED RELEASE ORAL DAILY
Status: DISCONTINUED | OUTPATIENT
Start: 2022-12-23 | End: 2022-12-27 | Stop reason: HOSPADM

## 2022-12-22 RX ORDER — PROPOFOL 10 MG/ML
INJECTION, EMULSION INTRAVENOUS AS NEEDED
Status: DISCONTINUED | OUTPATIENT
Start: 2022-12-22 | End: 2022-12-22 | Stop reason: HOSPADM

## 2022-12-22 RX ORDER — POLYETHYLENE GLYCOL 3350 17 G/17G
17 POWDER, FOR SOLUTION ORAL DAILY
Status: DISCONTINUED | OUTPATIENT
Start: 2022-12-23 | End: 2022-12-24

## 2022-12-22 RX ORDER — SODIUM CHLORIDE 0.9 % (FLUSH) 0.9 %
5-40 SYRINGE (ML) INJECTION AS NEEDED
Status: DISCONTINUED | OUTPATIENT
Start: 2022-12-22 | End: 2022-12-27 | Stop reason: HOSPADM

## 2022-12-22 RX ORDER — HYDROMORPHONE HYDROCHLORIDE 1 MG/ML
INJECTION, SOLUTION INTRAMUSCULAR; INTRAVENOUS; SUBCUTANEOUS AS NEEDED
Status: DISCONTINUED | OUTPATIENT
Start: 2022-12-22 | End: 2022-12-22 | Stop reason: HOSPADM

## 2022-12-22 RX ORDER — ASPIRIN 81 MG/1
81 TABLET ORAL 2 TIMES DAILY
Status: DISCONTINUED | OUTPATIENT
Start: 2022-12-22 | End: 2022-12-27 | Stop reason: HOSPADM

## 2022-12-22 RX ORDER — FACIAL-BODY WIPES
10 EACH TOPICAL DAILY PRN
Status: DISCONTINUED | OUTPATIENT
Start: 2022-12-24 | End: 2022-12-27 | Stop reason: HOSPADM

## 2022-12-22 RX ORDER — SODIUM CHLORIDE 0.9 % (FLUSH) 0.9 %
5-40 SYRINGE (ML) INJECTION EVERY 8 HOURS
Status: DISCONTINUED | OUTPATIENT
Start: 2022-12-22 | End: 2022-12-24

## 2022-12-22 RX ORDER — KETAMINE HYDROCHLORIDE 10 MG/ML
INJECTION, SOLUTION INTRAMUSCULAR; INTRAVENOUS AS NEEDED
Status: DISCONTINUED | OUTPATIENT
Start: 2022-12-22 | End: 2022-12-22 | Stop reason: HOSPADM

## 2022-12-22 RX ORDER — ONDANSETRON 2 MG/ML
4 INJECTION INTRAMUSCULAR; INTRAVENOUS AS NEEDED
Status: DISCONTINUED | OUTPATIENT
Start: 2022-12-22 | End: 2022-12-22 | Stop reason: HOSPADM

## 2022-12-22 RX ORDER — FENTANYL CITRATE 50 UG/ML
50 INJECTION, SOLUTION INTRAMUSCULAR; INTRAVENOUS AS NEEDED
Status: DISCONTINUED | OUTPATIENT
Start: 2022-12-22 | End: 2022-12-22 | Stop reason: HOSPADM

## 2022-12-22 RX ORDER — ROCURONIUM BROMIDE 10 MG/ML
INJECTION, SOLUTION INTRAVENOUS AS NEEDED
Status: DISCONTINUED | OUTPATIENT
Start: 2022-12-22 | End: 2022-12-22 | Stop reason: HOSPADM

## 2022-12-22 RX ORDER — FENTANYL CITRATE 50 UG/ML
25 INJECTION, SOLUTION INTRAMUSCULAR; INTRAVENOUS
Status: DISCONTINUED | OUTPATIENT
Start: 2022-12-22 | End: 2022-12-22 | Stop reason: HOSPADM

## 2022-12-22 RX ORDER — SODIUM CHLORIDE 0.9 % (FLUSH) 0.9 %
5-40 SYRINGE (ML) INJECTION AS NEEDED
Status: DISCONTINUED | OUTPATIENT
Start: 2022-12-22 | End: 2022-12-22 | Stop reason: HOSPADM

## 2022-12-22 RX ORDER — TRAMADOL HYDROCHLORIDE 50 MG/1
50 TABLET ORAL
Status: DISCONTINUED | OUTPATIENT
Start: 2022-12-22 | End: 2022-12-27 | Stop reason: HOSPADM

## 2022-12-22 RX ORDER — TRAZODONE HYDROCHLORIDE 100 MG/1
50 TABLET ORAL
Status: DISCONTINUED | OUTPATIENT
Start: 2022-12-22 | End: 2022-12-27 | Stop reason: HOSPADM

## 2022-12-22 RX ORDER — KETOROLAC TROMETHAMINE 30 MG/ML
15 INJECTION, SOLUTION INTRAMUSCULAR; INTRAVENOUS EVERY 6 HOURS
Status: DISPENSED | OUTPATIENT
Start: 2022-12-22 | End: 2022-12-23

## 2022-12-22 RX ORDER — SODIUM CHLORIDE 0.9 % (FLUSH) 0.9 %
5-40 SYRINGE (ML) INJECTION EVERY 8 HOURS
Status: DISCONTINUED | OUTPATIENT
Start: 2022-12-22 | End: 2022-12-22 | Stop reason: HOSPADM

## 2022-12-22 RX ORDER — AMOXICILLIN 250 MG
1 CAPSULE ORAL 2 TIMES DAILY
Status: DISCONTINUED | OUTPATIENT
Start: 2022-12-22 | End: 2022-12-27 | Stop reason: HOSPADM

## 2022-12-22 RX ORDER — HYDRALAZINE HYDROCHLORIDE 20 MG/ML
20 INJECTION INTRAMUSCULAR; INTRAVENOUS
Status: DISCONTINUED | OUTPATIENT
Start: 2022-12-22 | End: 2022-12-27 | Stop reason: HOSPADM

## 2022-12-22 RX ORDER — ACETAMINOPHEN 500 MG
1000 TABLET ORAL EVERY 8 HOURS
Status: DISCONTINUED | OUTPATIENT
Start: 2022-12-22 | End: 2022-12-27 | Stop reason: HOSPADM

## 2022-12-22 RX ORDER — ONDANSETRON 2 MG/ML
4 INJECTION INTRAMUSCULAR; INTRAVENOUS
Status: DISCONTINUED | OUTPATIENT
Start: 2022-12-22 | End: 2022-12-27 | Stop reason: HOSPADM

## 2022-12-22 RX ORDER — NEOSTIGMINE METHYLSULFATE 1 MG/ML
INJECTION, SOLUTION INTRAVENOUS AS NEEDED
Status: DISCONTINUED | OUTPATIENT
Start: 2022-12-22 | End: 2022-12-22 | Stop reason: HOSPADM

## 2022-12-22 RX ORDER — SODIUM CHLORIDE 9 MG/ML
75 INJECTION, SOLUTION INTRAVENOUS CONTINUOUS
Status: DISCONTINUED | OUTPATIENT
Start: 2022-12-22 | End: 2022-12-25

## 2022-12-22 RX ORDER — ONDANSETRON 2 MG/ML
INJECTION INTRAMUSCULAR; INTRAVENOUS AS NEEDED
Status: DISCONTINUED | OUTPATIENT
Start: 2022-12-22 | End: 2022-12-22 | Stop reason: HOSPADM

## 2022-12-22 RX ORDER — POTASSIUM CHLORIDE 7.45 MG/ML
10 INJECTION INTRAVENOUS
Status: COMPLETED | OUTPATIENT
Start: 2022-12-22 | End: 2022-12-22

## 2022-12-22 RX ORDER — SODIUM CHLORIDE, SODIUM LACTATE, POTASSIUM CHLORIDE, CALCIUM CHLORIDE 600; 310; 30; 20 MG/100ML; MG/100ML; MG/100ML; MG/100ML
50 INJECTION, SOLUTION INTRAVENOUS CONTINUOUS
Status: DISCONTINUED | OUTPATIENT
Start: 2022-12-22 | End: 2022-12-22 | Stop reason: HOSPADM

## 2022-12-22 RX ORDER — BUSPIRONE HYDROCHLORIDE 10 MG/1
10 TABLET ORAL 3 TIMES DAILY
Status: DISCONTINUED | OUTPATIENT
Start: 2022-12-22 | End: 2022-12-27 | Stop reason: HOSPADM

## 2022-12-22 RX ORDER — HYDROMORPHONE HYDROCHLORIDE 1 MG/ML
1 INJECTION, SOLUTION INTRAMUSCULAR; INTRAVENOUS; SUBCUTANEOUS
Status: DISCONTINUED | OUTPATIENT
Start: 2022-12-22 | End: 2022-12-23 | Stop reason: ALTCHOICE

## 2022-12-22 RX ORDER — PHENYLEPHRINE HCL IN 0.9% NACL 0.4MG/10ML
SYRINGE (ML) INTRAVENOUS AS NEEDED
Status: DISCONTINUED | OUTPATIENT
Start: 2022-12-22 | End: 2022-12-22 | Stop reason: HOSPADM

## 2022-12-22 RX ORDER — NALOXONE HYDROCHLORIDE 0.4 MG/ML
0.4 INJECTION, SOLUTION INTRAMUSCULAR; INTRAVENOUS; SUBCUTANEOUS AS NEEDED
Status: DISCONTINUED | OUTPATIENT
Start: 2022-12-22 | End: 2022-12-27 | Stop reason: HOSPADM

## 2022-12-22 RX ORDER — METFORMIN HYDROCHLORIDE 500 MG/1
500 TABLET ORAL 2 TIMES DAILY WITH MEALS
Status: DISCONTINUED | OUTPATIENT
Start: 2022-12-22 | End: 2022-12-23

## 2022-12-22 RX ORDER — HYDROMORPHONE HYDROCHLORIDE 1 MG/ML
0.2 INJECTION, SOLUTION INTRAMUSCULAR; INTRAVENOUS; SUBCUTANEOUS
Status: DISCONTINUED | OUTPATIENT
Start: 2022-12-22 | End: 2022-12-22 | Stop reason: HOSPADM

## 2022-12-22 RX ORDER — OXYCODONE HYDROCHLORIDE 5 MG/1
10 TABLET ORAL
Status: DISCONTINUED | OUTPATIENT
Start: 2022-12-22 | End: 2022-12-27 | Stop reason: HOSPADM

## 2022-12-22 RX ORDER — LIDOCAINE HYDROCHLORIDE 10 MG/ML
0.1 INJECTION, SOLUTION EPIDURAL; INFILTRATION; INTRACAUDAL; PERINEURAL AS NEEDED
Status: DISCONTINUED | OUTPATIENT
Start: 2022-12-22 | End: 2022-12-22 | Stop reason: HOSPADM

## 2022-12-22 RX ORDER — ONDANSETRON 4 MG/1
4 TABLET, ORALLY DISINTEGRATING ORAL
Status: DISCONTINUED | OUTPATIENT
Start: 2022-12-22 | End: 2022-12-27 | Stop reason: HOSPADM

## 2022-12-22 RX ORDER — HYDROXYZINE HYDROCHLORIDE 10 MG/1
10 TABLET, FILM COATED ORAL
Status: DISCONTINUED | OUTPATIENT
Start: 2022-12-22 | End: 2022-12-27 | Stop reason: HOSPADM

## 2022-12-22 RX ORDER — NALOXONE HYDROCHLORIDE 0.4 MG/ML
0.4 INJECTION, SOLUTION INTRAMUSCULAR; INTRAVENOUS; SUBCUTANEOUS
Status: DISCONTINUED | OUTPATIENT
Start: 2022-12-22 | End: 2022-12-27 | Stop reason: HOSPADM

## 2022-12-22 RX ORDER — ATORVASTATIN CALCIUM 40 MG/1
40 TABLET, FILM COATED ORAL DAILY
Status: DISCONTINUED | OUTPATIENT
Start: 2022-12-22 | End: 2022-12-27 | Stop reason: HOSPADM

## 2022-12-22 RX ORDER — TRANEXAMIC ACID 100 MG/ML
INJECTION, SOLUTION INTRAVENOUS AS NEEDED
Status: DISCONTINUED | OUTPATIENT
Start: 2022-12-22 | End: 2022-12-22 | Stop reason: HOSPADM

## 2022-12-22 RX ORDER — SODIUM CHLORIDE, SODIUM LACTATE, POTASSIUM CHLORIDE, CALCIUM CHLORIDE 600; 310; 30; 20 MG/100ML; MG/100ML; MG/100ML; MG/100ML
INJECTION, SOLUTION INTRAVENOUS
Status: DISCONTINUED | OUTPATIENT
Start: 2022-12-22 | End: 2022-12-22 | Stop reason: HOSPADM

## 2022-12-22 RX ORDER — HYDROMORPHONE HYDROCHLORIDE 1 MG/ML
0.5 INJECTION, SOLUTION INTRAMUSCULAR; INTRAVENOUS; SUBCUTANEOUS
Status: ACTIVE | OUTPATIENT
Start: 2022-12-22 | End: 2022-12-23

## 2022-12-22 RX ORDER — FENTANYL CITRATE 50 UG/ML
INJECTION, SOLUTION INTRAMUSCULAR; INTRAVENOUS AS NEEDED
Status: DISCONTINUED | OUTPATIENT
Start: 2022-12-22 | End: 2022-12-22 | Stop reason: HOSPADM

## 2022-12-22 RX ORDER — ACETAMINOPHEN 325 MG/1
650 TABLET ORAL ONCE
Status: DISCONTINUED | OUTPATIENT
Start: 2022-12-22 | End: 2022-12-22 | Stop reason: HOSPADM

## 2022-12-22 RX ORDER — FERROUS SULFATE, DRIED 160(50) MG
1 TABLET, EXTENDED RELEASE ORAL
Status: DISCONTINUED | OUTPATIENT
Start: 2022-12-23 | End: 2022-12-27 | Stop reason: HOSPADM

## 2022-12-22 RX ORDER — POTASSIUM CHLORIDE 7.45 MG/ML
10 INJECTION INTRAVENOUS
Status: DISCONTINUED | OUTPATIENT
Start: 2022-12-22 | End: 2022-12-22

## 2022-12-22 RX ORDER — POTASSIUM CHLORIDE 750 MG/1
40 TABLET, FILM COATED, EXTENDED RELEASE ORAL
Status: COMPLETED | OUTPATIENT
Start: 2022-12-22 | End: 2022-12-22

## 2022-12-22 RX ADMIN — CEFAZOLIN 2 G: 1 INJECTION, POWDER, FOR SOLUTION INTRAMUSCULAR; INTRAVENOUS at 22:07

## 2022-12-22 RX ADMIN — ACETAMINOPHEN 1000 MG: 500 TABLET, FILM COATED ORAL at 05:17

## 2022-12-22 RX ADMIN — Medication 3 MG: at 15:04

## 2022-12-22 RX ADMIN — HYDROMORPHONE HYDROCHLORIDE 0.5 MG: 1 INJECTION, SOLUTION INTRAMUSCULAR; INTRAVENOUS; SUBCUTANEOUS at 14:19

## 2022-12-22 RX ADMIN — ACETAMINOPHEN 1000 MG: 500 TABLET, FILM COATED ORAL at 22:01

## 2022-12-22 RX ADMIN — SODIUM CHLORIDE, POTASSIUM CHLORIDE, SODIUM LACTATE AND CALCIUM CHLORIDE: 600; 310; 30; 20 INJECTION, SOLUTION INTRAVENOUS at 14:14

## 2022-12-22 RX ADMIN — PROPOFOL 60 MG: 10 INJECTION, EMULSION INTRAVENOUS at 14:48

## 2022-12-22 RX ADMIN — ASPIRIN 81 MG: 81 TABLET, COATED ORAL at 22:01

## 2022-12-22 RX ADMIN — FENTANYL CITRATE 50 MCG: 50 INJECTION INTRAMUSCULAR; INTRAVENOUS at 14:20

## 2022-12-22 RX ADMIN — ONDANSETRON HYDROCHLORIDE 4 MG: 2 INJECTION, SOLUTION INTRAMUSCULAR; INTRAVENOUS at 15:02

## 2022-12-22 RX ADMIN — SODIUM CHLORIDE, POTASSIUM CHLORIDE, SODIUM LACTATE AND CALCIUM CHLORIDE 50 ML/HR: 600; 310; 30; 20 INJECTION, SOLUTION INTRAVENOUS at 14:05

## 2022-12-22 RX ADMIN — WATER 2 G: 1 INJECTION INTRAMUSCULAR; INTRAVENOUS; SUBCUTANEOUS at 14:25

## 2022-12-22 RX ADMIN — SODIUM CHLORIDE, POTASSIUM CHLORIDE, SODIUM LACTATE AND CALCIUM CHLORIDE 75 ML/HR: 600; 310; 30; 20 INJECTION, SOLUTION INTRAVENOUS at 00:05

## 2022-12-22 RX ADMIN — POTASSIUM CHLORIDE 40 MEQ: 750 TABLET, FILM COATED, EXTENDED RELEASE ORAL at 05:18

## 2022-12-22 RX ADMIN — HYDROMORPHONE HYDROCHLORIDE 1 MG: 1 INJECTION, SOLUTION INTRAMUSCULAR; INTRAVENOUS; SUBCUTANEOUS at 11:49

## 2022-12-22 RX ADMIN — Medication 120 MCG: at 14:59

## 2022-12-22 RX ADMIN — POTASSIUM CHLORIDE 10 MEQ: 7.46 INJECTION, SOLUTION INTRAVENOUS at 07:07

## 2022-12-22 RX ADMIN — PROPOFOL 30 MG: 10 INJECTION, EMULSION INTRAVENOUS at 14:44

## 2022-12-22 RX ADMIN — POTASSIUM CHLORIDE 10 MEQ: 7.46 INJECTION, SOLUTION INTRAVENOUS at 05:21

## 2022-12-22 RX ADMIN — GLYCOPYRROLATE 0.4 MG: 0.2 INJECTION INTRAMUSCULAR; INTRAVENOUS at 15:04

## 2022-12-22 RX ADMIN — ATORVASTATIN CALCIUM 40 MG: 40 TABLET, FILM COATED ORAL at 10:25

## 2022-12-22 RX ADMIN — TRANEXAMIC ACID 1 G: 100 INJECTION, SOLUTION INTRAVENOUS at 14:30

## 2022-12-22 RX ADMIN — TRAZODONE HYDROCHLORIDE 50 MG: 100 TABLET ORAL at 22:01

## 2022-12-22 RX ADMIN — KETOROLAC TROMETHAMINE 15 MG: 30 INJECTION, SOLUTION INTRAMUSCULAR; INTRAVENOUS at 22:07

## 2022-12-22 RX ADMIN — PROPOFOL 110 MG: 10 INJECTION, EMULSION INTRAVENOUS at 14:20

## 2022-12-22 RX ADMIN — BUSPIRONE HYDROCHLORIDE 10 MG: 10 TABLET ORAL at 22:02

## 2022-12-22 RX ADMIN — FENTANYL CITRATE 50 MCG: 50 INJECTION INTRAMUSCULAR; INTRAVENOUS at 14:44

## 2022-12-22 RX ADMIN — ACETAMINOPHEN 1000 MG: 500 TABLET, FILM COATED ORAL at 14:06

## 2022-12-22 RX ADMIN — HYDROMORPHONE HYDROCHLORIDE 1 MG: 1 INJECTION, SOLUTION INTRAMUSCULAR; INTRAVENOUS; SUBCUTANEOUS at 05:18

## 2022-12-22 RX ADMIN — SENNOSIDES AND DOCUSATE SODIUM 1 TABLET: 50; 8.6 TABLET ORAL at 22:02

## 2022-12-22 RX ADMIN — SODIUM CHLORIDE 75 ML/HR: 9 INJECTION, SOLUTION INTRAVENOUS at 05:17

## 2022-12-22 RX ADMIN — SODIUM CHLORIDE, PRESERVATIVE FREE 10 ML: 5 INJECTION INTRAVENOUS at 05:21

## 2022-12-22 RX ADMIN — BUSPIRONE HYDROCHLORIDE 10 MG: 10 TABLET ORAL at 10:25

## 2022-12-22 RX ADMIN — MORPHINE SULFATE 2 MG: 2 INJECTION, SOLUTION INTRAMUSCULAR; INTRAVENOUS at 03:13

## 2022-12-22 RX ADMIN — Medication 20 MG: at 14:20

## 2022-12-22 RX ADMIN — HYDROCHLOROTHIAZIDE: 25 TABLET ORAL at 05:17

## 2022-12-22 RX ADMIN — HYDRALAZINE HYDROCHLORIDE 20 MG: 20 INJECTION INTRAMUSCULAR; INTRAVENOUS at 16:26

## 2022-12-22 RX ADMIN — ROCURONIUM BROMIDE 35 MG: 10 SOLUTION INTRAVENOUS at 14:20

## 2022-12-22 NOTE — H&P
History & Physical    Primary Care Provider: None  Source of Information: Patient and chart review    History of Presenting Illness:   Robert Herndon is a 81 y.o. female with hx of htn, hx of mi, cad, mdd w/ fawn, niddm ii, who presented to hospital with hip pain after a fall. Had been at a store buying a lottery ticket when she twisted her ankle and fell to her back. Noted immediate right hip pain and was brought to ed for evaluation. No associated rle numbness or tingling. The patient denies any fever, chills, chest or abdominal pain, nausea, vomiting, cough, congestion, recent illness, palpitations, or dysuria. Remarkable vitals on ER Presentations: bp to 206/87  Labs Remarkable for: wbc 14.3, k-3.0,   ER Images: ct head: no acute process, xr right hip: right femoral neck frx. ER Rx: norco, morphine, zofran,      Review of Systems:  Pertinent items are noted in the History of Present Illness. Past Medical History:   Diagnosis Date    Arthritis     Chronic pain     Diabetes ([address]     Essential hypertension, malignant 4/6/2011    Hypertension     MI, old 2017    Pre-operative cardiovascular examination 4/6/2011    Psychiatric disorder     DEPRESSION/ANXIETY    PUD (peptic ulcer disease)     1970S    Pure hypercholesterolemia 4/6/2011    Type II or unspecified type diabetes mellitus without mention of complication, not stated as uncontrolled 4/6/2011      Past Surgical History:   Procedure Laterality Date    HX DILATION AND CURETTAGE      HX HEENT      EARS AS CHILD    HX SALPINGO-OOPHORECTOMY      HX TONSILLECTOMY      NEUROLOGICAL PROCEDURE UNLISTED      CERV. FUSION    GA ABDOMEN SURGERY PROC UNLISTED      BOWEL OBSTRUCTION X2    GA ABDOMEN SURGERY PROC UNLISTED      GASTRIC BYPASS    GA ABDOMEN SURGERY PROC UNLISTED      LAP SEBASTIÁN    GA ABDOMEN SURGERY PROC UNLISTED      HERNIA      Prior to Admission medications    Medication Sig Start Date End Date Taking? Authorizing Provider   aspirin delayed-release 81 mg tablet Take  by mouth daily. Yes Provider, Historical   atorvastatin (LIPITOR) 40 mg tablet Take 40 mg by mouth daily. 8/29/22  Yes Provider, Historical   dilTIAZem ER (TIAZAC) 240 mg capsule Take 240 mg by mouth daily. Yes Provider, Historical   olmesartan-hydroCHLOROthiazide (BENICAR HCT) 40-12.5 mg per tablet Take 1 Tablet by mouth. Yes Provider, Historical   busPIRone (BUSPAR) 10 mg tablet Take 10 mg by mouth three (3) times daily. Yes Provider, Historical   exenatide (BYETTA) 10 mcg/dose(250 mcg/mL) 2.4 mL pnij injection by SubCUTAneous route. Yes Provider, Historical   DULOXETINE HCL (CYMBALTA PO) Take 50 mg by mouth daily. 4/6/11  Yes Provider, Historical   MULTIVITAMIN PO Take  by mouth daily. 4/6/11  Yes Provider, Historical   trazodone (DESYREL) 100 mg tablet Take  by mouth nightly. Take two tablets at bedtime   Yes Provider, Historical   VITAMIN A PO Take  by mouth daily. 4/6/11  Yes Provider, Historical   CYANOCOBALAMIN, VITAMIN B-12, (VITAMIN B-12 PO) Take  by mouth daily. 4/6/11  Yes Provider, Historical   ASCORBATE CALCIUM (VITAMIN C PO) Take  by mouth daily. 4/6/11  Yes Provider, Historical   ergocalciferol (ERGOCALCIFEROL) 1,250 mcg (50,000 unit) capsule Take 50,000 Units by mouth. Once a week    Yes Provider, Historical   VITAMIN E ACETATE (VITAMIN E PO) Take  by mouth daily. 4/6/11  Yes Provider, Historical   COD LIVER OIL PO Take  by mouth daily. Patient not taking: Reported on 12/21/2022    Provider, Historical   ascorbic acid, vitamin C, (VITAMIN C) 500 mg tablet Take  by mouth daily. Patient not taking: Reported on 12/21/2022    Provider, Historical   ERGOCALCIFEROL, VITAMIN D2, (VITAMIN D PO) Take  by mouth daily. Patient not taking: Reported on 12/21/2022    Provider, Historical   metformin (GLUCOPHAGE) 500 mg tablet Take  by mouth two (2) times daily (with meals).   Patient not taking: Reported on 12/21/2022    Provider, Historical   propoxyphene napsylate-acetaminophen (DARVOCET-N 100) 100-650 mg per tablet Take 1 Tab by mouth. Every 4 hours as needed  Patient not taking: Reported on 12/21/2022    Provider, Historical     Allergies   Allergen Reactions    Actonel [Risedronate] Other (comments)     BREATHING DIFFICULTIES AND EDEMA AND HIVES    Penicillin G Other (comments)     Black out      Family History   Problem Relation Age of Onset    Diabetes Mother     Hypertension Mother     Heart Disease Mother     Hypertension Father     Heart Disease Father     Cancer Father     Diabetes Father     Hypertension Sister     Hypertension Brother     Cancer Paternal Grandmother     Cancer Paternal Grandfather     Hypertension Sister     Cancer Sister         SOCIAL HISTORY:  Patient resides:  Independently x   Assisted Living    SNF    With family care       Smoking history:   None x   Former    Chronic      Alcohol history:   None x   Social    Chronic      Ambulates:   Independently x   w/cane    w/walker    w/wc    CODE STATUS:  DNR    Full x   Other      Objective:     Physical Exam:     Visit Vitals  BP (!) 179/94   Pulse 83   Temp 98.9 °F (37.2 °C)   Resp 16   SpO2 97%      O2 Device: None (Room air)    General:  Alert, cooperative, no distress, appears stated age. Head:  Normocephalic, without obvious abnormality, atraumatic. Eyes:  Conjunctivae/corneas clear. PERRL, EOMs intact. Nose: Nares normal. Septum midline. Mucosa normal.        Neck: Supple, symmetrical, trachea midline       Lungs:   Clear to auscultation bilaterally. Chest wall:  No tenderness or deformity. Heart:  Regular rate and rhythm, S1, S2 normal   Abdomen:   Soft, non-tender. Bowel sounds normal. No masses,  No organomegaly. Extremities: Extremities normal, atraumatic, no cyanosis or edema. Pulses: 2+ and symmetric all extremities. Skin: Skin color, texture, turgor normal. No rashes or lesions   Neurologic: CNII-XII grossly intact.         Data Review:     Recent Days:  Recent Labs     12/21/22 1948   WBC 14.3*   HGB 12.5   HCT 38.5        Recent Labs     12/21/22 1948      K 3.0*      CO2 25   *   BUN 13   CREA 1.07*   CA 9.3   ALB 3.8   ALT 25   INR 1.0     No results for input(s): PH, PCO2, PO2, HCO3, FIO2 in the last 72 hours. 24 Hour Results:  Recent Results (from the past 24 hour(s))   CBC WITH AUTOMATED DIFF    Collection Time: 12/21/22  7:48 PM   Result Value Ref Range    WBC 14.3 (H) 3.6 - 11.0 K/uL    RBC 4.87 3.80 - 5.20 M/uL    HGB 12.5 11.5 - 16.0 g/dL    HCT 38.5 35.0 - 47.0 %    MCV 79.1 (L) 80.0 - 99.0 FL    MCH 25.7 (L) 26.0 - 34.0 PG    MCHC 32.5 30.0 - 36.5 g/dL    RDW 18.0 (H) 11.5 - 14.5 %    PLATELET 588 952 - 305 K/uL    MPV 9.9 8.9 - 12.9 FL    NRBC 0.0 0  WBC    ABSOLUTE NRBC 0.00 0.00 - 0.01 K/uL    NEUTROPHILS 86 (H) 32 - 75 %    LYMPHOCYTES 7 (L) 12 - 49 %    MONOCYTES 6 5 - 13 %    EOSINOPHILS 0 0 - 7 %    BASOPHILS 1 0 - 1 %    IMMATURE GRANULOCYTES 0 0.0 - 0.5 %    ABS. NEUTROPHILS 12.2 (H) 1.8 - 8.0 K/UL    ABS. LYMPHOCYTES 1.0 0.8 - 3.5 K/UL    ABS. MONOCYTES 0.9 0.0 - 1.0 K/UL    ABS. EOSINOPHILS 0.0 0.0 - 0.4 K/UL    ABS. BASOPHILS 0.1 0.0 - 0.1 K/UL    ABS. IMM. GRANS. 0.1 (H) 0.00 - 0.04 K/UL    DF AUTOMATED     METABOLIC PANEL, COMPREHENSIVE    Collection Time: 12/21/22  7:48 PM   Result Value Ref Range    Sodium 141 136 - 145 mmol/L    Potassium 3.0 (L) 3.5 - 5.1 mmol/L    Chloride 102 97 - 108 mmol/L    CO2 25 21 - 32 mmol/L    Anion gap 14 5 - 15 mmol/L    Glucose 154 (H) 65 - 100 mg/dL    BUN 13 6 - 20 MG/DL    Creatinine 1.07 (H) 0.55 - 1.02 MG/DL    BUN/Creatinine ratio 12 12 - 20      eGFR 52 (L) >60 ml/min/1.73m2    Calcium 9.3 8.5 - 10.1 MG/DL    Bilirubin, total 0.5 0.2 - 1.0 MG/DL    ALT (SGPT) 25 12 - 78 U/L    AST (SGOT) 35 15 - 37 U/L    Alk.  phosphatase 102 45 - 117 U/L    Protein, total 7.3 6.4 - 8.2 g/dL    Albumin 3.8 3.5 - 5.0 g/dL    Globulin 3.5 2.0 - 4.0 g/dL A-G Ratio 1.1 1.1 - 2.2     PROTHROMBIN TIME + INR    Collection Time: 12/21/22  7:48 PM   Result Value Ref Range    INR 1.0 0.9 - 1.1      Prothrombin time 9.6 9.0 - 11.1 sec   TYPE & SCREEN    Collection Time: 12/21/22  7:48 PM   Result Value Ref Range    Crossmatch Expiration 12/24/2022,2359     ABO/Rh(D) A POSITIVE     Antibody screen NEG          Imaging:     Assessment:     Kulwinder President is a 81 y.o. female with hx of htn, hx of mi, cad, mdd w/ tuan, niddm ii, who is admitted for right hip fracture.        Plan:       Right Femoral Neck Fracture  -has plans for surgical intervention in am  -keep npo  -roxu, tylenol and prn dilaudid  -no medical contraindication to planned surgery  -pt/ot consult    HTN  -resume home meds    TUAN  -c/w home buspar    Dyslipidemia  -c/w home lipitor    NIDDM II  -SSI +Hypoglycemic protocols    Obesity  -Counseled on weight loss, dieting and exercise          [email or web address]  Activity - as tolerated  DVT prophylaxis - scds  GI prophylaxis -  NI  Disposition - Home    CODE STATUS:  full code       Signed By: Yaneli Leahy MD     December 22, 2022

## 2022-12-22 NOTE — CONSULTS
FRACTURE CONSULT NOTE    Subjective:     Date of Consultation:  December 22, 2022  Referring Physician:  Guerline Slater is a 81 y.o. female  with a past medical history of hypertension, diabetes, hyperlipidemia who presents after GLF Patient reports she was walking in a parking lot when she missed stepped and fell. She fell onto her right leg. States she did hit her head but no LOC. No blood thinner use.      Patient Active Problem List    Diagnosis Date Noted    Hip fracture ([address] 12/21/2022    Acute on chronic congestive heart failure, unspecified heart failure type ([address] 10/13/2022    Cervical stenosis of spine 08/08/2011    Nonspecific abnormal electrocardiogram (ECG) (EKG) 04/06/2011    Essential hypertension, malignant 04/06/2011    Pure hypercholesterolemia 04/06/2011    Type II or unspecified type diabetes mellitus without mention of complication, not stated as uncontrolled 04/06/2011     Family History   Problem Relation Age of Onset    Diabetes Mother     Hypertension Mother     Heart Disease Mother     Hypertension Father     Heart Disease Father     Cancer Father     Diabetes Father     Hypertension Sister     Hypertension Brother     Cancer Paternal Grandmother     Cancer Paternal Grandfather     Hypertension Sister     Cancer Sister       Social History     Tobacco Use    Smoking status: Never    Smokeless tobacco: Never   Substance Use Topics    Alcohol use: Yes     Comment: RARE     Past Medical History:   Diagnosis Date    Arthritis     Chronic pain     Diabetes [address] [address]     Essential hypertension, malignant 4/6/2011    Hypertension     MI, old 2017    Pre-operative cardiovascular examination 4/6/2011    Psychiatric disorder     DEPRESSION/ANXIETY    PUD (peptic ulcer disease)     1970S    Pure hypercholesterolemia 4/6/2011    Type II or unspecified type diabetes mellitus without mention of complication, not stated as uncontrolled 4/6/2011      Past Surgical History:   Procedure Laterality Date    HX DILATION AND CURETTAGE      HX HEENT      EARS AS CHILD    HX SALPINGO-OOPHORECTOMY      HX TONSILLECTOMY      NEUROLOGICAL PROCEDURE UNLISTED      CERV. FUSION    [place] ABDOMEN SURGERY PROC UNLISTED      BOWEL OBSTRUCTION X2    [place] ABDOMEN SURGERY PROC UNLISTED      GASTRIC BYPASS    [place] ABDOMEN SURGERY PROC UNLISTED      LAP SEBASTIÁN    [place] ABDOMEN SURGERY PROC UNLISTED      HERNIA       Prior to Admission medications    Medication Sig Start Date End Date Taking? Authorizing Provider   aspirin delayed-release 81 mg tablet Take  by mouth daily. Yes Provider, Historical   atorvastatin (LIPITOR) 40 mg tablet Take 40 mg by mouth daily. 8/29/22  Yes Provider, Historical   dilTIAZem ER (TIAZAC) 240 mg capsule Take 240 mg by mouth daily. Yes Provider, Historical   olmesartan-hydroCHLOROthiazide (BENICAR HCT) 40-12.5 mg per tablet Take 1 Tablet by mouth. Yes Provider, Historical   busPIRone (BUSPAR) 10 mg tablet Take 10 mg by mouth three (3) times daily. Yes Provider, Historical   exenatide (BYETTA) 10 mcg/dose(250 mcg/mL) 2.4 mL pnij injection by SubCUTAneous route. Yes Provider, Historical   DULOXETINE HCL (CYMBALTA PO) Take 50 mg by mouth daily. 4/6/11  Yes Provider, Historical   MULTIVITAMIN PO Take  by mouth daily. 4/6/11  Yes Provider, Historical   trazodone (DESYREL) 100 mg tablet Take  by mouth nightly. Take two tablets at bedtime   Yes Provider, Historical   VITAMIN A PO Take  by mouth daily. 4/6/11  Yes Provider, Historical   CYANOCOBALAMIN, VITAMIN B-12, (VITAMIN B-12 PO) Take  by mouth daily. 4/6/11  Yes Provider, Historical   ASCORBATE CALCIUM (VITAMIN C PO) Take  by mouth daily. 4/6/11  Yes Provider, Historical   ergocalciferol (ERGOCALCIFEROL) 1,250 mcg (50,000 unit) capsule Take 50,000 Units by mouth. Once a week    Yes Provider, Historical   VITAMIN E ACETATE (VITAMIN E PO) Take  by mouth daily. 4/6/11  Yes Provider, Historical   COD LIVER OIL PO Take  by mouth daily.   Patient not taking: Reported on 12/21/2022    Provider, Historical   ascorbic acid, vitamin C, (VITAMIN C) 500 mg tablet Take  by mouth daily. Patient not taking: Reported on 12/21/2022    Provider, Historical   ERGOCALCIFEROL, VITAMIN D2, (VITAMIN D PO) Take  by mouth daily. Patient not taking: Reported on 12/21/2022    Provider, Historical   metformin (GLUCOPHAGE) 500 mg tablet Take  by mouth two (2) times daily (with meals). Patient not taking: Reported on 12/21/2022    Provider, Historical   propoxyphene napsylate-acetaminophen (DARVOCET-N 100) 100-650 mg per tablet Take 1 Tab by mouth. Every 4 hours as needed  Patient not taking: Reported on 12/21/2022    Provider, Historical     Current Facility-Administered Medications   Medication Dose Route Frequency    atorvastatin (LIPITOR) tablet 40 mg  40 mg Oral DAILY    busPIRone (BUSPAR) tablet 10 mg  10 mg Oral TID    traZODone (DESYREL) tablet 50 mg  50 mg Oral QHS    . PHARMACY TO SUBSTITUTE PER PROTOCOL (Reordered from: DULOXETINE HCL (CYMBALTA PO))    Per Protocol    losartan/hydroCHLOROthiazide (HYZAAR) 50/12.5 mg   Oral DAILY    hydrALAZINE (APRESOLINE) 20 mg/mL injection 20 mg  20 mg IntraVENous Q6H PRN    oxyCODONE IR (ROXICODONE) tablet 10 mg  10 mg Oral Q4H PRN    HYDROmorphone (DILAUDID) injection 1 mg  1 mg IntraVENous Q6H PRN    acetaminophen (TYLENOL) tablet 1,000 mg  1,000 mg Oral Q8H    sodium chloride (NS) flush 5-40 mL  5-40 mL IntraVENous Q8H    sodium chloride (NS) flush 5-40 mL  5-40 mL IntraVENous PRN    ondansetron (ZOFRAN ODT) tablet 4 mg  4 mg Oral Q8H PRN    Or    ondansetron (ZOFRAN) injection 4 mg  4 mg IntraVENous Q6H PRN    0.9% sodium chloride infusion  75 mL/hr IntraVENous CONTINUOUS    polyethylene glycol (MIRALAX) packet 17 g  17 g Oral DAILY    senna-docusate (PERICOLACE) 8.6-50 mg per tablet 1 Tablet  1 Tablet Oral BID    naloxone (NARCAN) injection 0.4 mg  0.4 mg IntraVENous EVERY 2 MINUTES AS NEEDED    lactated Ringers infusion  50 mL/hr IntraVENous CONTINUOUS    sodium chloride (NS) flush 5-40 mL  5-40 mL IntraVENous Q8H    sodium chloride (NS) flush 5-40 mL  5-40 mL IntraVENous PRN    lidocaine (PF) (XYLOCAINE) 10 mg/mL (1 %) injection 0.1 mL  0.1 mL SubCUTAneous PRN    fentaNYL citrate (PF) injection 50 mcg  50 mcg IntraVENous PRN    midazolam (VERSED) injection 1 mg  1 mg IntraVENous PRN    acetaminophen (TYLENOL) tablet 650 mg  650 mg Oral ONCE      Allergies   Allergen Reactions    Actonel [Risedronate] Other (comments)     BREATHING DIFFICULTIES AND EDEMA AND HIVES    Penicillin G Other (comments)     Black out        Review of Systems:    Negative for fevers, chills, nausea, vomiting, chest pain, shortness of breath, headaches.       Objective:     Patient Vitals for the past 24 hrs:   Temp Pulse Resp BP SpO2   22 1204 -- 71 -- -- --   22 1024 -- 76 -- 128/76 --   22 1023 97 °F (36.1 °C) 76 16 127/77 96 %   22 1003 -- 75 -- -- --   22 0547 -- 88 -- -- --   22 0358 -- 83 -- (!) 179/94 --   22 0349 -- 73 -- -- --   22 0308 98.9 °F (37.2 °C) 84 16 (!) 191/115 97 %   22 0150 -- 80 -- -- --   22 2322 98.2 °F (36.8 °C) 86 16 (!) 187/101 95 %   22 -- -- -- (!) 154/112 94 %   22 1922 -- -- -- -- 98 %   22 1910 -- -- 16 (!) 214/67 94 %   22 1745 -- -- -- (!) 225/79 --   22 -- -- -- (!) 226/83 --   22 -- -- -- (!) 230/80 --   225 -- 61 16 -- 98 %   22 1529 98.5 °F (36.9 °C) 99 14 (!) 206/87 96 %       Temp (24hrs), Av.2 °F (36.8 °C), Min:97 °F (36.1 °C), Max:98.9 °F (37.2 °C)      Gen: NAD, A&Ox3  Resp: Non-labored breathing  CV: Extremities well perfused  Abd: soft, NT  RLE: shortened, rotated, did not range due to pain, skin intact, warm well perfused, SILT in al distributions L3-S1, palpable pedal pulses, no calf tenderness  LLE: no pain with ROM, no swelling about knee or ankle, skin intact, warm well perfused, SILT in al distributions L3-S1, palpable pedal pulses, no calf tenderness    Imaging Review: Displaced right femoral neck fracture    Labs:   Recent Results (from the past 24 hour(s))   CBC WITH AUTOMATED DIFF    Collection Time: 12/21/22  7:48 PM   Result Value Ref Range    WBC 14.3 (H) 3.6 - 11.0 K/uL    RBC 4.87 3.80 - 5.20 M/uL    HGB 12.5 11.5 - 16.0 g/dL    HCT 38.5 35.0 - 47.0 %    MCV 79.1 (L) 80.0 - 99.0 FL    MCH 25.7 (L) 26.0 - 34.0 PG    MCHC 32.5 30.0 - 36.5 g/dL    RDW 18.0 (H) 11.5 - 14.5 %    PLATELET 363 721 - 998 K/uL    MPV 9.9 8.9 - 12.9 FL    NRBC 0.0 0  WBC    ABSOLUTE NRBC 0.00 0.00 - 0.01 K/uL    NEUTROPHILS 86 (H) 32 - 75 %    LYMPHOCYTES 7 (L) 12 - 49 %    MONOCYTES 6 5 - 13 %    EOSINOPHILS 0 0 - 7 %    BASOPHILS 1 0 - 1 %    IMMATURE GRANULOCYTES 0 0.0 - 0.5 %    ABS. NEUTROPHILS 12.2 (H) 1.8 - 8.0 K/UL    ABS. LYMPHOCYTES 1.0 0.8 - 3.5 K/UL    ABS. MONOCYTES 0.9 0.0 - 1.0 K/UL    ABS. EOSINOPHILS 0.0 0.0 - 0.4 K/UL    ABS. BASOPHILS 0.1 0.0 - 0.1 K/UL    ABS. IMM. GRANS. 0.1 (H) 0.00 - 0.04 K/UL    DF AUTOMATED     METABOLIC PANEL, COMPREHENSIVE    Collection Time: 12/21/22  7:48 PM   Result Value Ref Range    Sodium 141 136 - 145 mmol/L    Potassium 3.0 (L) 3.5 - 5.1 mmol/L    Chloride 102 97 - 108 mmol/L    CO2 25 21 - 32 mmol/L    Anion gap 14 5 - 15 mmol/L    Glucose 154 (H) 65 - 100 mg/dL    BUN 13 6 - 20 MG/DL    Creatinine 1.07 (H) 0.55 - 1.02 MG/DL    BUN/Creatinine ratio 12 12 - 20      eGFR 52 (L) >60 ml/min/1.73m2    Calcium 9.3 8.5 - 10.1 MG/DL    Bilirubin, total 0.5 0.2 - 1.0 MG/DL    ALT (SGPT) 25 12 - 78 U/L    AST (SGOT) 35 15 - 37 U/L    Alk.  phosphatase 102 45 - 117 U/L    Protein, total 7.3 6.4 - 8.2 g/dL    Albumin 3.8 3.5 - 5.0 g/dL    Globulin 3.5 2.0 - 4.0 g/dL    A-G Ratio 1.1 1.1 - 2.2     PROTHROMBIN TIME + INR    Collection Time: 12/21/22  7:48 PM   Result Value Ref Range    INR 1.0 0.9 - 1.1      Prothrombin time 9.6 9.0 - 11.1 sec   TYPE & SCREEN Collection Time: 12/21/22  7:48 PM   Result Value Ref Range    Crossmatch Expiration 12/24/2022,2359     ABO/Rh(D) A POSITIVE     Antibody screen NEG    GLUCOSE, POC    Collection Time: 12/22/22  7:06 AM   Result Value Ref Range    Glucose (POC) 120 (H) 65 - 117 mg/dL    Performed by 57Christiana Mendiola, POC    Collection Time: 12/22/22 11:29 AM   Result Value Ref Range    Glucose (POC) 142 (H) 65 - 117 mg/dL    Performed by ELSA Whatley  PCT          Current Facility-Administered Medications   Medication Dose Route Frequency    atorvastatin (LIPITOR) tablet 40 mg  40 mg Oral DAILY    busPIRone (BUSPAR) tablet 10 mg  10 mg Oral TID    traZODone (DESYREL) tablet 50 mg  50 mg Oral QHS    . PHARMACY TO SUBSTITUTE PER PROTOCOL (Reordered from: DULOXETINE HCL (CYMBALTA PO))    Per Protocol    losartan/hydroCHLOROthiazide (HYZAAR) 50/12.5 mg   Oral DAILY    hydrALAZINE (APRESOLINE) 20 mg/mL injection 20 mg  20 mg IntraVENous Q6H PRN    oxyCODONE IR (ROXICODONE) tablet 10 mg  10 mg Oral Q4H PRN    HYDROmorphone (DILAUDID) injection 1 mg  1 mg IntraVENous Q6H PRN    acetaminophen (TYLENOL) tablet 1,000 mg  1,000 mg Oral Q8H    sodium chloride (NS) flush 5-40 mL  5-40 mL IntraVENous Q8H    sodium chloride (NS) flush 5-40 mL  5-40 mL IntraVENous PRN    ondansetron (ZOFRAN ODT) tablet 4 mg  4 mg Oral Q8H PRN    Or    ondansetron (ZOFRAN) injection 4 mg  4 mg IntraVENous Q6H PRN    0.9% sodium chloride infusion  75 mL/hr IntraVENous CONTINUOUS    polyethylene glycol (MIRALAX) packet 17 g  17 g Oral DAILY    senna-docusate (PERICOLACE) 8.6-50 mg per tablet 1 Tablet  1 Tablet Oral BID    naloxone (NARCAN) injection 0.4 mg  0.4 mg IntraVENous EVERY 2 MINUTES AS NEEDED    lactated Ringers infusion  50 mL/hr IntraVENous CONTINUOUS    sodium chloride (NS) flush 5-40 mL  5-40 mL IntraVENous Q8H    sodium chloride (NS) flush 5-40 mL  5-40 mL IntraVENous PRN    lidocaine (PF) (XYLOCAINE) 10 mg/mL (1 %) injection 0.1 mL  0.1 mL SubCUTAneous PRN    fentaNYL citrate (PF) injection 50 mcg  50 mcg IntraVENous PRN    midazolam (VERSED) injection 1 mg  1 mg IntraVENous PRN    acetaminophen (TYLENOL) tablet 650 mg  650 mg Oral ONCE         Impression:     Active Problems:    Hip fracture ([address] (12/21/2022)    82 yo female with displaced right femoral neck fracture after GLF. Deemed medically optimized. She understands elevated risk due to age, DM, HTN, and other medical comorbidities. Plan:   Plan for R anaya    Risks and benefits of joint arthroplasty discussed at length including but not limited to bleeding, need for blood transfusion, infection, damage to surrounding structures, intra-operative fracture, blood clots, pulmonary embolism, death. The patient understands the risks of surgery. All questions answered. They elected to move forward.          Wilder Lubin MD

## 2022-12-22 NOTE — CONSULTS
ORTHOPAEDIC CONSULT NOTE    Subjective:     Date of Consultation:  December 22, 2022  Referring Physician:  Karen Mcgill is a 81 y.o. female with past medical history significant for hypertension, hypercholesterolemia, type 2 diabetes, peptic ulcer disease and history of an old MI is seen for right hip and groin pain following a ground-level fall yesterday. States she was walking in a store when she tripped and lost her footing landing on her right side. She describes pain in the groin and down into her thigh towards the knee but not past the knee. She denies any lower back pain. She denies any ankle or foot pain. She denies any open injuries. She denies tingling or numbness. Patient lives independently and alone with no family in the area. She is a independent ambulator.      Patient Active Problem List    Diagnosis Date Noted    Hip fracture ([address] 12/21/2022    Acute on chronic congestive heart failure, unspecified heart failure type ([address] 10/13/2022    Cervical stenosis of spine 08/08/2011    Nonspecific abnormal electrocardiogram (ECG) (EKG) 04/06/2011    Essential hypertension, malignant 04/06/2011    Pure hypercholesterolemia 04/06/2011    Type II or unspecified type diabetes mellitus without mention of complication, not stated as uncontrolled 04/06/2011     Family History   Problem Relation Age of Onset    Diabetes Mother     Hypertension Mother     Heart Disease Mother     Hypertension Father     Heart Disease Father     Cancer Father     Diabetes Father     Hypertension Sister     Hypertension Brother     Cancer Paternal Grandmother     Cancer Paternal Grandfather     Hypertension Sister     Cancer Sister       Social History     Tobacco Use    Smoking status: Never    Smokeless tobacco: Never   Substance Use Topics    Alcohol use: Yes     Comment: RARE     Past Medical History:   Diagnosis Date    Arthritis     Chronic pain     Diabetes ([address]     Essential hypertension, malignant 4/6/2011    Hypertension     MI, old 2017    Pre-operative cardiovascular examination 4/6/2011    Psychiatric disorder     DEPRESSION/ANXIETY    PUD (peptic ulcer disease)     1970S    Pure hypercholesterolemia 4/6/2011    Type II or unspecified type diabetes mellitus without mention of complication, not stated as uncontrolled 4/6/2011      Past Surgical History:   Procedure Laterality Date    HX DILATION AND CURETTAGE      HX HEENT      EARS AS CHILD    HX SALPINGO-OOPHORECTOMY      HX TONSILLECTOMY      NEUROLOGICAL PROCEDURE UNLISTED      CERV. FUSION    [place] ABDOMEN SURGERY PROC UNLISTED      BOWEL OBSTRUCTION X2    [place] ABDOMEN SURGERY PROC UNLISTED      GASTRIC BYPASS    [place] ABDOMEN SURGERY PROC UNLISTED      LAP SEBASTIÁN    [place] ABDOMEN SURGERY PROC UNLISTED      HERNIA       Prior to Admission medications    Medication Sig Start Date End Date Taking? Authorizing Provider   aspirin delayed-release 81 mg tablet Take  by mouth daily. Yes Provider, Historical   atorvastatin (LIPITOR) 40 mg tablet Take 40 mg by mouth daily. 8/29/22  Yes Provider, Historical   dilTIAZem ER (TIAZAC) 240 mg capsule Take 240 mg by mouth daily. Yes Provider, Historical   olmesartan-hydroCHLOROthiazide (BENICAR HCT) 40-12.5 mg per tablet Take 1 Tablet by mouth. Yes Provider, Historical   busPIRone (BUSPAR) 10 mg tablet Take 10 mg by mouth three (3) times daily. Yes Provider, Historical   exenatide (BYETTA) 10 mcg/dose(250 mcg/mL) 2.4 mL pnij injection by SubCUTAneous route. Yes Provider, Historical   DULOXETINE HCL (CYMBALTA PO) Take 50 mg by mouth daily. 4/6/11  Yes Provider, Historical   MULTIVITAMIN PO Take  by mouth daily. 4/6/11  Yes Provider, Historical   trazodone (DESYREL) 100 mg tablet Take  by mouth nightly. Take two tablets at bedtime   Yes Provider, Historical   VITAMIN A PO Take  by mouth daily. 4/6/11  Yes Provider, Historical   CYANOCOBALAMIN, VITAMIN B-12, (VITAMIN B-12 PO) Take  by mouth daily.  4/6/11  Yes Provider, Historical   ASCORBATE CALCIUM (VITAMIN C PO) Take  by mouth daily. 4/6/11  Yes Provider, Historical   ergocalciferol (ERGOCALCIFEROL) 1,250 mcg (50,000 unit) capsule Take 50,000 Units by mouth. Once a week    Yes Provider, Historical   VITAMIN E ACETATE (VITAMIN E PO) Take  by mouth daily. 4/6/11  Yes Provider, Historical   COD LIVER OIL PO Take  by mouth daily. Patient not taking: Reported on 12/21/2022    Provider, Historical   ascorbic acid, vitamin C, (VITAMIN C) 500 mg tablet Take  by mouth daily. Patient not taking: Reported on 12/21/2022    Provider, Historical   ERGOCALCIFEROL, VITAMIN D2, (VITAMIN D PO) Take  by mouth daily. Patient not taking: Reported on 12/21/2022    Provider, Historical   metformin (GLUCOPHAGE) 500 mg tablet Take  by mouth two (2) times daily (with meals). Patient not taking: Reported on 12/21/2022    Provider, Historical   propoxyphene napsylate-acetaminophen (DARVOCET-N 100) 100-650 mg per tablet Take 1 Tab by mouth. Every 4 hours as needed  Patient not taking: Reported on 12/21/2022    Provider, Historical     Current Facility-Administered Medications   Medication Dose Route Frequency    atorvastatin (LIPITOR) tablet 40 mg  40 mg Oral DAILY    busPIRone (BUSPAR) tablet 10 mg  10 mg Oral TID    traZODone (DESYREL) tablet 50 mg  50 mg Oral QHS    . PHARMACY TO SUBSTITUTE PER PROTOCOL (Reordered from: DULOXETINE HCL (CYMBALTA PO))    Per Protocol    . PHARMACY TO SUBSTITUTE PER PROTOCOL (Reordered from: dilTIAZem ER (TIAZAC) 240 mg capsule)    Per Protocol    losartan/hydroCHLOROthiazide (HYZAAR) 50/12.5 mg   Oral DAILY    hydrALAZINE (APRESOLINE) 20 mg/mL injection 20 mg  20 mg IntraVENous Q6H PRN    oxyCODONE IR (ROXICODONE) tablet 10 mg  10 mg Oral Q4H PRN    HYDROmorphone (DILAUDID) injection 1 mg  1 mg IntraVENous Q6H PRN    acetaminophen (TYLENOL) tablet 1,000 mg  1,000 mg Oral Q8H    sodium chloride (NS) flush 5-40 mL  5-40 mL IntraVENous Q8H    sodium chloride (NS) flush 5-40 mL  5-40 mL IntraVENous PRN    ondansetron (ZOFRAN ODT) tablet 4 mg  4 mg Oral Q8H PRN    Or    ondansetron (ZOFRAN) injection 4 mg  4 mg IntraVENous Q6H PRN    0.9% sodium chloride infusion  75 mL/hr IntraVENous CONTINUOUS    polyethylene glycol (MIRALAX) packet 17 g  17 g Oral DAILY    senna-docusate (PERICOLACE) 8.6-50 mg per tablet 1 Tablet  1 Tablet Oral BID    naloxone (NARCAN) injection 0.4 mg  0.4 mg IntraVENous EVERY 2 MINUTES AS NEEDED    lactated Ringers infusion  75 mL/hr IntraVENous CONTINUOUS      Allergies   Allergen Reactions    Actonel [Risedronate] Other (comments)     BREATHING DIFFICULTIES AND EDEMA AND HIVES    Penicillin G Other (comments)     Black out        Review of Systems:  Pertinent items are noted in HPI. Mental Status: no dementia    Objective:     Patient Vitals for the past 8 hrs:   BP Temp Pulse Resp SpO2   22 0547 -- -- 88 -- --   22 0358 (!) 179/94 -- 83 -- --   22 0349 -- -- 73 -- --   22 0308 (!) 191/115 98.9 °F (37.2 °C) 84 16 97 %   22 0150 -- -- 80 -- --     Temp (24hrs), Av.5 °F (36.9 °C), Min:98.2 °F (36.8 °C), Max:98.9 °F (37.2 °C)      EXAM: Awake and alert lying in bed; NAD; agreeable to exam  Right leg is slightly shortened compared to the left but without much external rotation  Movement of leg increases pain; logroll of hip increases pain  Mildly tender to palpation over the right greater trochanter  Knee, ankle and foot all nontender to palpation  No significant erythema or ecchymosis about the leg  Moves her ankles and toes to command  Distal sensory function grossly intact  Distal pulses palpable    Imaging Review:   EXAM: XR KNEE RT 3 V, XR HIP RT W OR WO PELV 2-3 VWS, XR FEMUR RT 2 VS     INDICATION: Right hip, femur, and knee pain after fall and injury     COMPARISON: CT pelvis on 10/27/2006. Right knee views on 2019.      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 Bones are osteopenic. Right femoral neck fracture is  partially obscured by patient positioning. No evidence of underlying bone  lesion. No other fracture. Mild bilateral hip osteoarthritis. Mild right knee  osteoarthritis. There are vascular calcifications. Small right knee joint  effusion. IMPRESSION     1. Right femoral neck fracture. 2. No other fracture. 3. Right hip and right knee osteoarthritis. Labs:   Recent Results (from the past 24 hour(s))   CBC WITH AUTOMATED DIFF    Collection Time: 12/21/22  7:48 PM   Result Value Ref Range    WBC 14.3 (H) 3.6 - 11.0 K/uL    RBC 4.87 3.80 - 5.20 M/uL    HGB 12.5 11.5 - 16.0 g/dL    HCT 38.5 35.0 - 47.0 %    MCV 79.1 (L) 80.0 - 99.0 FL    MCH 25.7 (L) 26.0 - 34.0 PG    MCHC 32.5 30.0 - 36.5 g/dL    RDW 18.0 (H) 11.5 - 14.5 %    PLATELET 150 273 - 255 K/uL    MPV 9.9 8.9 - 12.9 FL    NRBC 0.0 0  WBC    ABSOLUTE NRBC 0.00 0.00 - 0.01 K/uL    NEUTROPHILS 86 (H) 32 - 75 %    LYMPHOCYTES 7 (L) 12 - 49 %    MONOCYTES 6 5 - 13 %    EOSINOPHILS 0 0 - 7 %    BASOPHILS 1 0 - 1 %    IMMATURE GRANULOCYTES 0 0.0 - 0.5 %    ABS. NEUTROPHILS 12.2 (H) 1.8 - 8.0 K/UL    ABS. LYMPHOCYTES 1.0 0.8 - 3.5 K/UL    ABS. MONOCYTES 0.9 0.0 - 1.0 K/UL    ABS. EOSINOPHILS 0.0 0.0 - 0.4 K/UL    ABS. BASOPHILS 0.1 0.0 - 0.1 K/UL    ABS. IMM.  GRANS. 0.1 (H) 0.00 - 0.04 K/UL    DF AUTOMATED     METABOLIC PANEL, COMPREHENSIVE    Collection Time: 12/21/22  7:48 PM   Result Value Ref Range    Sodium 141 136 - 145 mmol/L    Potassium 3.0 (L) 3.5 - 5.1 mmol/L    Chloride 102 97 - 108 mmol/L    CO2 25 21 - 32 mmol/L    Anion gap 14 5 - 15 mmol/L    Glucose 154 (H) 65 - 100 mg/dL    BUN 13 6 - 20 MG/DL    Creatinine 1.07 (H) 0.55 - 1.02 MG/DL    BUN/Creatinine ratio 12 12 - 20      eGFR 52 (L) >60 ml/min/1.73m2 Calcium 9.3 8.5 - 10.1 MG/DL    Bilirubin, total 0.5 0.2 - 1.0 MG/DL    ALT (SGPT) 25 12 - 78 U/L    AST (SGOT) 35 15 - 37 U/L    Alk. phosphatase 102 45 - 117 U/L    Protein, total 7.3 6.4 - 8.2 g/dL    Albumin 3.8 3.5 - 5.0 g/dL    Globulin 3.5 2.0 - 4.0 g/dL    A-G Ratio 1.1 1.1 - 2.2     PROTHROMBIN TIME + INR    Collection Time: 12/21/22  7:48 PM   Result Value Ref Range    INR 1.0 0.9 - 1.1      Prothrombin time 9.6 9.0 - 11.1 sec   TYPE & SCREEN    Collection Time: 12/21/22  7:48 PM   Result Value Ref Range    Crossmatch Expiration 12/24/2022,2359     ABO/Rh(D) A POSITIVE     Antibody screen NEG    GLUCOSE, POC    Collection Time: 12/22/22  7:06 AM   Result Value Ref Range    Glucose (POC) 120 (H) 65 - 117 mg/dL    Performed by Ford Monroy          Impression:     Active Problems:    Hip fracture ([address] (12/21/2022)        Plan:   Acute right femoral neck fracture    Fracture indicated for operative fixation with hemiarthroplasty. Risks and benefits of operative versus nonoperative fixation discussed with the patient who is in agreement with proceeding. Admitted to medicine service for preoperative evaluation and medical management  Remain n.p.o.  Maintain bedrest for now  Ortega catheter already placed for large amount of retained urine  Pain medication as needed  Consent for right hip hemiarthroplasty  Likely 2 OR this afternoon with Dr. Koko Astudillo aware of patient and in agreement with current plan of care.       [facility] [address] [facility] [address]

## 2022-12-22 NOTE — ANESTHESIA PREPROCEDURE EVALUATION
Relevant Problems   No relevant active problems       Anesthetic History   No history of anesthetic complications            Review of Systems / Medical History  Patient summary reviewed, nursing notes reviewed and pertinent labs reviewed    Pulmonary  Within defined limits                 Neuro/Psych         Psychiatric history     Cardiovascular    Hypertension          CAD and hyperlipidemia    Exercise tolerance: >4 METS  Comments:   Left Ventricle: Hyperdynamic left ventricular systolic function with a visually estimated EF of 70 - 75%. Left ventricle is dilated. Normal wall thickness. Normal wall motion. Diastolic dysfunction present with normal LV EF.   Aortic Valve: Tricuspid valve.   Technical qualifiers: Echo study was technically difficult with poor endocardial visualization, a technically difficult Doppler study and technically difficult due to low parasternal window.      GI/Hepatic/Renal           PUD     Endo/Other    Diabetes    Arthritis     Other Findings              Physical Exam    Airway  Mallampati: II  TM Distance: 4 - 6 cm  Neck ROM: normal range of motion   Mouth opening: Normal     Cardiovascular    Rhythm: regular  Rate: normal         Dental  No notable dental hx       Pulmonary  Breath sounds clear to auscultation               Abdominal  Abdominal exam normal       Other Findings            Anesthetic Plan    ASA: 3  Anesthesia type: general          Induction: Intravenous  Anesthetic plan and risks discussed with: Patient

## 2022-12-22 NOTE — PROGRESS NOTES
1725TRANSFER - OUT REPORT:    Verbal report given to Margarita Allen RN(name) on Clement Macy  being transferred to 57(unit) for routine post - op       Report consisted of patients Situation, Background, Assessment and   Recommendations(SBAR). Time Pre op antibiotic given:1425  Anesthesia Stop time: 0939    Information from the following report(s) SBAR, Kardex, OR Summary, Intake/Output, and MAR was reviewed with the receiving nurse. Opportunity for questions and clarification was provided. Is the patient on 02? YES       L/Min 2          Is the patient on a monitor? NO    Is the nurse transporting with the patient? NO    Surgical Waiting Area notified of patient's transfer from PACU? YES      The following personal items collected during your admission accompanied patient upon transfer:   Dental Appliance: Dental Appliances: None  Vision:    Hearing Aid:    Jewelry: Jewelry: Ring, Earrings, Bracelet (ring taped, consent signed, braclet and earrings to security)  Clothing: Clothing: None  Other Valuables:  Other Valuables: None  Valuables sent to safe: Personal Items Sent to Safe: clear stone ear rings, multi clear stone braclet

## 2022-12-22 NOTE — PROGRESS NOTES
Occupational Therapy Note:     Consult received and chart reviewed. Patient admitted with R femoral neck fx with plan for OR this afternoon. We will follow up tomorrow for assessment per ortho.        Erlin Joe OT

## 2022-12-22 NOTE — PROGRESS NOTES
[facility] Adult  Hospitalist Group                                                                                          Hospitalist Progress Note  Kassy Kemp NP  Answering service: [phone] from in house phone        Date of Service:  2022  NAME:  Claudine Goodpasture  :  1940  MRN:  [id number]      Admission Summary:   Claudine Goodpasture is a 81 y.o. female with hx of htn, hx of mi, cad, mdd w/ fawn, niddm ii, who presented to hospital with hip pain after a fall. Had been at a store buying a lottery ticket when she twisted her ankle and fell to her back. Noted immediate right hip pain and was brought to ed for evaluation. No associated rle numbness or tingling. The patient denies any fever, chills, chest or abdominal pain, nausea, vomiting, cough, congestion, recent illness, palpitations, or dysuria. Remarkable vitals on ER Presentations: bp to 206/87  Labs Remarkable for: wbc 14.3, k-3.0,   ER Images: ct head: no acute process, xr right hip: right femoral neck frx.   ER Rx: norco, morphine, zofran,        Interval history / Subjective:      Pain controlled, OR later today, VSS, right lefg shortened and rotated    Assessment & Plan:     Right Femoral Neck Fracture  -has plans for surgical intervention in am  -keep npo  -roxu, tylenol and prn dilaudid  -no medical contraindication to planned surgery  -pt/ot consult     HTN  -resume home meds     FAWN  -c/w home buspar     Dyslipidemia  -c/w home lipitor     NIDDM II  -SSI +Hypoglycemic protocols     Obesity  -Counseled on weight loss, dieting and exercise          Code status: Full  Prophylaxis: [address] discussed with: patient, nurse, CM  Anticipated Disposition: SNF > 48 hours     Hospital Problems  Date Reviewed: 2011            Codes Class Noted POA    Hip fracture [facility]) ICD-10-CM: X66.745A  ICD-9-CM: 820.8  2022 Unknown             Review of Systems:   A comprehensive review of systems was negative except for that written in the HPI. Vital Signs:    Last 24hrs VS reviewed since prior progress note. Most recent are:  Visit Vitals  /76   Pulse 71   Temp 97 °F (36.1 °C)   Resp 16   SpO2 96%         Intake/Output Summary (Last 24 hours) at 12/22/2022 1414  Last data filed at 12/22/2022 9641  Gross per 24 hour   Intake --   Output 1425 ml   Net -1425 ml        Physical Examination:     I had a face to face encounter with this patient and independently examined them on 12/22/2022 as outlined below:          Constitutional:  No acute distress, cooperative, pleasant    ENT:  Oral mucosa moist, oropharynx benign. Resp:  CTA bilaterally. No wheezing/rhonchi/rales. No accessory muscle use. CV:  Regular rhythm, normal rate, no murmurs, gallops, rubs    GI:  Soft, non distended, non tender. normoactive bowel sounds, no hepatosplenomegaly     Musculoskeletal:  No edema, warm, 2+ pulses throughout    Neurologic:  Moves all extremities. AAOx3, CN II-XII reviewed            Data Review:    Review and/or order of clinical lab test  Review and/or order of tests in the radiology section of CPT  Review and/or order of tests in the medicine section of CPT      Labs:     Recent Labs     12/21/22 1948   WBC 14.3*   HGB 12.5   HCT 38.5        Recent Labs     12/21/22 1948      K 3.0*      CO2 25   BUN 13   CREA 1.07*   *   CA 9.3     Recent Labs     12/21/22 1948   ALT 25      TBILI 0.5   TP 7.3   ALB 3.8   GLOB 3.5     Recent Labs     12/21/22 1948   INR 1.0   PTP 9.6      No results for input(s): FE, TIBC, PSAT, FERR in the last 72 hours. Lab Results   Component Value Date/Time    Folate >24.0 (H) 08/03/2009 03:59 PM      No results for input(s): PH, PCO2, PO2 in the last 72 hours. No results for input(s): CPK, CKNDX, TROIQ in the last 72 hours.     No lab exists for component: CPKMB  Lab Results   Component Value Date/Time    Cholesterol, total 142 10/14/2022 10:06 AM    HDL Cholesterol 74 10/14/2022 10:06 AM    LDL, calculated 55.4 10/14/2022 10:06 AM    Triglyceride 63 10/14/2022 10:06 AM    CHOL/HDL Ratio 1.9 10/14/2022 10:06 AM     Lab Results   Component Value Date/Time    Glucose (POC) 136 (H) 12/22/2022 01:57 PM    Glucose (POC) 142 (H) 12/22/2022 11:29 AM    Glucose (POC) 120 (H) 12/22/2022 07:06 AM    Glucose (POC) 213 (H) 10/17/2022 12:23 PM    Glucose (POC) 111 10/17/2022 07:39 AM    Glucose (POC) 179 (H) 10/16/2022 09:58 PM     Lab Results   Component Value Date/Time    Color YELLOW/STRAW 10/16/2022 02:23 PM    Appearance CLEAR 10/16/2022 02:23 PM    Specific gravity 1.014 10/16/2022 02:23 PM    pH (UA) 5.0 10/16/2022 02:23 PM    Protein Negative 10/16/2022 02:23 PM    Glucose Negative 10/16/2022 02:23 PM    Ketone TRACE (A) 10/16/2022 02:23 PM    Bilirubin Negative 10/16/2022 02:23 PM    Urobilinogen 0.2 10/16/2022 02:23 PM    Nitrites Negative 10/16/2022 02:23 PM    Leukocyte Esterase SMALL (A) 10/16/2022 02:23 PM    Epithelial cells FEW 10/16/2022 02:23 PM    Bacteria Negative 10/16/2022 02:23 PM    WBC 5-10 10/16/2022 02:23 PM    RBC 0-5 10/16/2022 02:23 PM         Medications Reviewed:     Current Facility-Administered Medications   Medication Dose Route Frequency    atorvastatin (LIPITOR) tablet 40 mg  40 mg Oral DAILY    busPIRone (BUSPAR) tablet 10 mg  10 mg Oral TID    traZODone (DESYREL) tablet 50 mg  50 mg Oral QHS    . PHARMACY TO SUBSTITUTE PER PROTOCOL (Reordered from: DULOXETINE HCL (CYMBALTA PO))    Per Protocol    losartan/hydroCHLOROthiazide (HYZAAR) 50/12.5 mg   Oral DAILY    hydrALAZINE (APRESOLINE) 20 mg/mL injection 20 mg  20 mg IntraVENous Q6H PRN    oxyCODONE IR (ROXICODONE) tablet 10 mg  10 mg Oral Q4H PRN    HYDROmorphone (DILAUDID) injection 1 mg  1 mg IntraVENous Q6H PRN    acetaminophen (TYLENOL) tablet 1,000 mg  1,000 mg Oral Q8H    sodium chloride (NS) flush 5-40 mL  5-40 mL IntraVENous Q8H    sodium chloride (NS) flush 5-40 mL  5-40 mL IntraVENous PRN    ondansetron (ZOFRAN ODT) tablet 4 mg  4 mg Oral Q8H PRN    Or    ondansetron (ZOFRAN) injection 4 mg  4 mg IntraVENous Q6H PRN    0.9% sodium chloride infusion  75 mL/hr IntraVENous CONTINUOUS    polyethylene glycol (MIRALAX) packet 17 g  17 g Oral DAILY    senna-docusate (PERICOLACE) 8.6-50 mg per tablet 1 Tablet  1 Tablet Oral BID    naloxone (NARCAN) injection 0.4 mg  0.4 mg IntraVENous EVERY 2 MINUTES AS NEEDED    lactated Ringers infusion  50 mL/hr IntraVENous CONTINUOUS    sodium chloride (NS) flush 5-40 mL  5-40 mL IntraVENous Q8H    sodium chloride (NS) flush 5-40 mL  5-40 mL IntraVENous PRN    lidocaine (PF) (XYLOCAINE) 10 mg/mL (1 %) injection 0.1 mL  0.1 mL SubCUTAneous PRN    fentaNYL citrate (PF) injection 50 mcg  50 mcg IntraVENous PRN    midazolam (VERSED) injection 1 mg  1 mg IntraVENous PRN    acetaminophen (TYLENOL) tablet 650 mg  650 mg Oral ONCE    ceFAZolin (ANCEF) 2 g in sterile water (preservative free) 20 mL IV syringe  2 g IntraVENous ON CALL TO OR     ______________________________________________________________________  EXPECTED LENGTH OF STAY: - - -  ACTUAL LENGTH OF STAY:          1                 Gay Osorio V NP

## 2022-12-22 NOTE — PROGRESS NOTES
Bedside and Verbal shift change report given to Sesar Helms RN (oncoming nurse) by Vincenzo Kellogg RN (offgoing nurse). Report included the following information SBAR, Kardex, Procedure Summary, Intake/Output, MAR, Accordion, and Recent Results.

## 2022-12-22 NOTE — CONSULTS
Brief ORtho    [facility] consult from HOWIE regarding 82F with Right hip and thigh pain following a GLF. Found to have femoral neck fracture and is in process of being transferred to [facility] for further management.     Admitting to [facility] for pre-op eval and medical management  Fracture indicated for hemiarthroplasty  Keep NPO after midnight  Pain control ordered  Will see for formal consult once transferred, likely in am  Likely to [address] pm    Dr Melvin Terry aware of patient and in agreement with current plan    Javi Padilla PA-C  Orthopedic Trauma Service  Count includes the [facility]

## 2022-12-22 NOTE — PROGRESS NOTES
Physical Therapy    Consult received and chart reviewed. Patient admitted with R femoral neck fx with plan for OR this afternoon. We will follow up tomorrow for assessment per ortho.     Jarett Whatley, PT

## 2022-12-22 NOTE — OP NOTES
Name: Jae Campos  MRN:  [id number]  : 1940  Age:  81 y.o. Surgery Date: 2022      OPERATIVE REPORT - RIGHT HIP HEMIARTHROPLASTY -   ANTERIOR APPROACH    PREOPERATIVE DIAGNOSIS: Femoral neck fracture right hip    POSTOPERATIVE DIAGNOSIS: Femoral neck fracture right hip    PROCEDURE PERFORMED: Right bipolar hemiarthroplasty    SURGEON: Krzysztof Dunham MD    FIRST ASSISTANTKala Medici    ANESTHESIA: General    PRE-OP ANTIBIOTIC: Ancef 2g    COMPLICATIONS: None. ESTIMATED BLOOD LOSS: 300 mL. SPECIMENS REMOVED: None    COMPONENTS IMPLANTED:   Implant Name Type Inv. Item Serial No.  Lot No. LRB No. Used Action   HEAD BIPOLAR SELF-CENTERING [id number] GRY - SN/A Joint Component HEAD BIPOLAR SELF-CENTERING 45X28 GRY N/A [employer]_ [id number] Right 1 Implanted   HEAD FEM [id number] NK L+1.5MM HIP MTL ON MTL  TAPR - SN/A  HEAD FEM [id number] NK L+1.5MM HIP MTL ON MTL  TAPR N/A [facility] [employer]_ [id number] Right 1 Implanted   STEM FEM SZ 12 135DEG HI OFFSET HIP TI HA CLLRLSS PRESSFIT - SN/A  STEM FEM SZ 12 135DEG HI OFFSET HIP TI HA CLLRLSS PRESSFIT N/A [facility] [employer]_ [id number] Right 1 Implanted       INDICATIONS: The patient is an 81 yrs female with a right femoral neck fracture. Risks, benefits, alternatives of the procedure were reviewed and the patient desired to proceed. They understood the increased risk for perioperative medical complications due to their medical comorbidities. They were deemed medically optimized prior to surgery by the hospitalist service. DESCRIPTION OF PROCEDURE: Anesthetic was initiated. Preoperative dose of IV  antibiotic was given. Ortega catheter was placed. The right side was confirmed as the operative side, prepped and draped in the usual sterile fashion. Skin was covered with Ioban occlusive dressing. One gram of tranexamic acid given intravenously if not contraindicated.      Direct anterior exposure was made to the patient's hip through the sartorius tensor interval well lateral of the ASIS to protect the LFCN. Anterior hip vasculature including the ascending branches of the lateral circumflex artery were cauterized. Retractors were taken out to observe for bleeding and there was none. A T capsulotomy was made with bovie electrocautery. The Charnley retractor was repositioned for exposure. The femoral neck was recut. Femoral head was removed from the acetabulum. Femur was positioned and elevated from the wound. Appropriate soft tissue releases were made including the posterior capsule and obturator internus. The medullary canal was entered with a box osteotome. The femur was broached to a size 12. Calcar planed and then trialed. A 45 mm , +1 hip ball was the most appropriate for leg length and tension with offset to best match native offset. The hip was dislocated. The trial was removed and the real stem was impacted. The real hip ball was placed. The hip was reduced. After copious irrigation, the capsule was closed with #1 Stratafix barbed sutures. I irrigated the skin, subcutaneous and deep wound. I closed the fascia of the tensor fascia kristine with #1 stratafix barbed sutures. Skin and subcutaneous were irrigated. Soft tissues were infiltrated with local anesthetic. 2 grams of tranexamic acid with 0.4 mL of epi given topically in the wound. Skin and subcutaneous were closed in a standard fashion with 2-0 vicryl and 3-0 monocryl followed by Dermabond. An aquacel dressing was applied. There were no complications. No specimen was sent. The procedure was a RIGHT BIPOLAR HEMIARTHROPLASTY using a Depuy construct. The patient was transferred to the recovery room in stable condition.     Dayana Thurman MD

## 2022-12-22 NOTE — PROGRESS NOTES
TRANSFER - IN REPORT:    Verbal report received from ROSY Bates(name) on Fatoumata Bustos  being received from [facility] ED(unit) for routine progression of care      Report consisted of patients Situation, Background, Assessment and   Recommendations(SBAR). Information from the following report(s) SBAR, Kardex, ED Summary, Intake/Output, MAR, Accordion, and Recent Results was reviewed with the receiving nurse. Opportunity for questions and clarification was provided. Assessment completed upon patients arrival to unit and care assumed.

## 2022-12-23 LAB
ANION GAP SERPL CALC-SCNC: 2 MMOL/L (ref 5–15)
BASOPHILS # BLD: 0.1 K/UL (ref 0–0.1)
BASOPHILS NFR BLD: 1 % (ref 0–1)
BUN SERPL-MCNC: 14 MG/DL (ref 6–20)
BUN/CREAT SERPL: 13 (ref 12–20)
CALCIUM SERPL-MCNC: 7.7 MG/DL (ref 8.5–10.1)
CHLORIDE SERPL-SCNC: 108 MMOL/L (ref 97–108)
CO2 SERPL-SCNC: 28 MMOL/L (ref 21–32)
CREAT SERPL-MCNC: 1.12 MG/DL (ref 0.55–1.02)
DIFFERENTIAL METHOD BLD: ABNORMAL
EOSINOPHIL # BLD: 0.2 K/UL (ref 0–0.4)
EOSINOPHIL NFR BLD: 2 % (ref 0–7)
ERYTHROCYTE [DISTWIDTH] IN BLOOD BY AUTOMATED COUNT: 19.3 % (ref 11.5–14.5)
GLUCOSE BLD STRIP.AUTO-MCNC: 144 MG/DL (ref 65–117)
GLUCOSE BLD STRIP.AUTO-MCNC: 159 MG/DL (ref 65–117)
GLUCOSE BLD STRIP.AUTO-MCNC: 182 MG/DL (ref 65–117)
GLUCOSE SERPL-MCNC: 134 MG/DL (ref 65–100)
HCT VFR BLD AUTO: 33.3 % (ref 35–47)
HGB BLD-MCNC: 10.7 G/DL (ref 11.5–16)
IMM GRANULOCYTES # BLD AUTO: 0 K/UL (ref 0–0.04)
IMM GRANULOCYTES NFR BLD AUTO: 0 % (ref 0–0.5)
LYMPHOCYTES # BLD: 1.1 K/UL (ref 0.8–3.5)
LYMPHOCYTES NFR BLD: 13 % (ref 12–49)
MCH RBC QN AUTO: 28.4 PG (ref 26–34)
MCHC RBC AUTO-ENTMCNC: 32.1 G/DL (ref 30–36.5)
MCV RBC AUTO: 88.3 FL (ref 80–99)
MONOCYTES # BLD: 1 K/UL (ref 0–1)
MONOCYTES NFR BLD: 11 % (ref 5–13)
NEUTS SEG # BLD: 6.4 K/UL (ref 1.8–8)
NEUTS SEG NFR BLD: 73 % (ref 32–75)
NRBC # BLD: 0 K/UL (ref 0–0.01)
NRBC BLD-RTO: 0 PER 100 WBC
PLATELET # BLD AUTO: 270 K/UL (ref 150–400)
PMV BLD AUTO: 9.6 FL (ref 8.9–12.9)
POTASSIUM SERPL-SCNC: 4.1 MMOL/L (ref 3.5–5.1)
RBC # BLD AUTO: 3.77 M/UL (ref 3.8–5.2)
RBC MORPH BLD: ABNORMAL
SERVICE CMNT-IMP: ABNORMAL
SODIUM SERPL-SCNC: 138 MMOL/L (ref 136–145)
WBC # BLD AUTO: 8.8 K/UL (ref 3.6–11)

## 2022-12-23 PROCEDURE — 80048 BASIC METABOLIC PNL TOTAL CA: CPT

## 2022-12-23 PROCEDURE — 85025 COMPLETE CBC W/AUTO DIFF WBC: CPT

## 2022-12-23 PROCEDURE — 36415 COLL VENOUS BLD VENIPUNCTURE: CPT

## 2022-12-23 PROCEDURE — 97530 THERAPEUTIC ACTIVITIES: CPT | Performed by: OCCUPATIONAL THERAPIST

## 2022-12-23 PROCEDURE — 97116 GAIT TRAINING THERAPY: CPT

## 2022-12-23 PROCEDURE — 97530 THERAPEUTIC ACTIVITIES: CPT

## 2022-12-23 PROCEDURE — 74011250637 HC RX REV CODE- 250/637: Performed by: ORTHOPAEDIC SURGERY

## 2022-12-23 PROCEDURE — 97161 PT EVAL LOW COMPLEX 20 MIN: CPT

## 2022-12-23 PROCEDURE — 97535 SELF CARE MNGMENT TRAINING: CPT | Performed by: OCCUPATIONAL THERAPIST

## 2022-12-23 PROCEDURE — 65270000046 HC RM TELEMETRY

## 2022-12-23 PROCEDURE — 51798 US URINE CAPACITY MEASURE: CPT

## 2022-12-23 PROCEDURE — 74011250636 HC RX REV CODE- 250/636: Performed by: ORTHOPAEDIC SURGERY

## 2022-12-23 PROCEDURE — 97165 OT EVAL LOW COMPLEX 30 MIN: CPT | Performed by: OCCUPATIONAL THERAPIST

## 2022-12-23 PROCEDURE — 74011000250 HC RX REV CODE- 250: Performed by: ORTHOPAEDIC SURGERY

## 2022-12-23 PROCEDURE — 82962 GLUCOSE BLOOD TEST: CPT

## 2022-12-23 RX ADMIN — ASPIRIN 81 MG: 81 TABLET, COATED ORAL at 20:32

## 2022-12-23 RX ADMIN — KETOROLAC TROMETHAMINE 15 MG: 30 INJECTION, SOLUTION INTRAMUSCULAR; INTRAVENOUS at 04:00

## 2022-12-23 RX ADMIN — DULOXETINE HYDROCHLORIDE 60 MG: 30 CAPSULE, DELAYED RELEASE ORAL at 15:25

## 2022-12-23 RX ADMIN — OXYCODONE 10 MG: 5 TABLET ORAL at 09:28

## 2022-12-23 RX ADMIN — SODIUM CHLORIDE, PRESERVATIVE FREE 10 ML: 5 INJECTION INTRAVENOUS at 20:36

## 2022-12-23 RX ADMIN — BUSPIRONE HYDROCHLORIDE 10 MG: 10 TABLET ORAL at 20:34

## 2022-12-23 RX ADMIN — ASPIRIN 81 MG: 81 TABLET, COATED ORAL at 09:27

## 2022-12-23 RX ADMIN — ACETAMINOPHEN 1000 MG: 500 TABLET, FILM COATED ORAL at 05:08

## 2022-12-23 RX ADMIN — SENNOSIDES AND DOCUSATE SODIUM 1 TABLET: 50; 8.6 TABLET ORAL at 09:27

## 2022-12-23 RX ADMIN — SODIUM CHLORIDE, PRESERVATIVE FREE 10 ML: 5 INJECTION INTRAVENOUS at 05:09

## 2022-12-23 RX ADMIN — TRAZODONE HYDROCHLORIDE 50 MG: 100 TABLET ORAL at 20:35

## 2022-12-23 RX ADMIN — SENNOSIDES AND DOCUSATE SODIUM 1 TABLET: 50; 8.6 TABLET ORAL at 20:34

## 2022-12-23 RX ADMIN — ACETAMINOPHEN 1000 MG: 500 TABLET, FILM COATED ORAL at 15:26

## 2022-12-23 RX ADMIN — ACETAMINOPHEN 1000 MG: 500 TABLET, FILM COATED ORAL at 20:32

## 2022-12-23 RX ADMIN — CEFAZOLIN 2 G: 1 INJECTION, POWDER, FOR SOLUTION INTRAMUSCULAR; INTRAVENOUS at 05:09

## 2022-12-23 RX ADMIN — KETOROLAC TROMETHAMINE 15 MG: 30 INJECTION, SOLUTION INTRAMUSCULAR; INTRAVENOUS at 15:25

## 2022-12-23 RX ADMIN — BUSPIRONE HYDROCHLORIDE 10 MG: 10 TABLET ORAL at 09:27

## 2022-12-23 RX ADMIN — OXYCODONE 10 MG: 5 TABLET ORAL at 15:26

## 2022-12-23 RX ADMIN — SODIUM CHLORIDE 125 ML/HR: 9 INJECTION, SOLUTION INTRAVENOUS at 05:15

## 2022-12-23 RX ADMIN — OYSTER SHELL CALCIUM WITH VITAMIN D 1 TABLET: 500; 200 TABLET, FILM COATED ORAL at 15:25

## 2022-12-23 RX ADMIN — ATORVASTATIN CALCIUM 40 MG: 40 TABLET, FILM COATED ORAL at 09:27

## 2022-12-23 RX ADMIN — SODIUM CHLORIDE, PRESERVATIVE FREE 10 ML: 5 INJECTION INTRAVENOUS at 15:28

## 2022-12-23 RX ADMIN — OXYCODONE 10 MG: 5 TABLET ORAL at 20:36

## 2022-12-23 NOTE — ACP (ADVANCE CARE PLANNING)
Advance Care Planning   Advance Care Planning Inpatient Note  [address] Department    Today's Date: 2022  Unit: [facility] JOINT    Received request from IDT member. Upon review of chart and communication with care team, patient's decision making abilities are not in question. Patient was/were present in the room during visit. Goals of ACP Conversation:  Discuss Advance Care planning documents  Facilitate a discussion related to patient's goals of care as they align with the patient's values and beliefs    Health Care Decision Makers:    No healthcare decision makers have been documented. Click here to complete [employer] including selection of the Healthcare Decision Maker Relationship (ie \"Primary\")  Summary:  No Decision Maker named by patient at this time    Advance Care Planning Documents (Patient Wishes) on file:  She reports that her friend Edouard Combs, who is noted in chart, is her MPOA and has an AMD. She was requested to have a copy presented to hospital for scanning. Assessment:    Looking at her chart prior to visit I noted that she has no next of kin listed and no AMD. An AMD conversation was engaged based on this observation. In conversation she noted that she is a , her   in April of this year. She has one surviving sister from whom she is estranged; the sisters do not speak to each other. She reports that her friend Edouard Combs, who is noted in chart, is her MPOA and has an AMD. She was requested to have a copy presented to hospital for scanning.     Interventions:  Requested patient/family submit existing document(s) for our records: None    Care Preferences Communicated:  No    Outcomes/Plan:  ACP Discussion Refused    Darrin GardnerMontgomery General Hospital on 2022 at 12:11 PM

## 2022-12-23 NOTE — PROGRESS NOTES
[address] [facility] Adult  Hospitalist Group                                                                                          Hospitalist Progress Note  Justin Weston NP  Answering service: [phone] from in house phone        Date of Service:  2022  NAME:  Naif Hogan  :  1940  MRN:  [id number]      Admission Summary:   Naif Hogan is a 81 y.o. female with hx of htn, hx of mi, cad, mdd w/ tuan, niddm ii, who presented to hospital with hip pain after a fall. Had been at a store buying a lottery ticket when she twisted her ankle and fell to her back. Noted immediate right hip pain and was brought to ed for evaluation. No associated rle numbness or tingling. The patient denies any fever, chills, chest or abdominal pain, nausea, vomiting, cough, congestion, recent illness, palpitations, or dysuria. Remarkable vitals on ER Presentations: bp to 206/87  Labs Remarkable for: wbc 14.3, k-3.0,   ER Images: ct head: no acute process, xr right hip: right femoral neck frx.   ER Rx: norco, morphine, zofran,        Interval history / Subjective:      VSS  Hgb 10.7  POD#1  Sitting up in chair, pain controlled  Lives alone,  this year, no family in the area  Ortega removed this am, if patient hasnt voided in 4 hours, nursing to bladder scan and advise    Assessment & Plan:     Right Femoral Neck Fracture  - s/p Right bipolar hemiarthroplasty on  w/ Dr. Kenroy Alford  -roxu, tylenol and prn dilaudid  -no medical contraindication to planned surgery  -pt/ot consult     HTN  -resume home meds     TUAN  -c/w home buspar     Dyslipidemia  -c/w home lipitor     NIDDM II  -SSI +Hypoglycemic protocols     Obesity  -Counseled on weight loss, dieting and exercise          Code status: Full  Prophylaxis: [address] discussed with: patient, nurse, CM  Anticipated Disposition: > 48 hours     Hospital Problems  Date Reviewed: 2011            Codes Class Noted POA    Hip fracture [facility]) ICD-10-CM: S72.009A  ICD-9-CM: 820.8  12/21/2022 Unknown           Review of Systems:   A comprehensive review of systems was negative except for that written in the HPI. Vital Signs:    Last 24hrs VS reviewed since prior progress note. Most recent are:  Visit Vitals  /62 (BP 1 Location: Right upper arm, BP Patient Position: Supine)   Pulse 64   Temp 99.7 °F (37.6 °C)   Resp 18   SpO2 94%         Intake/Output Summary (Last 24 hours) at 12/23/2022 1128  Last data filed at 12/22/2022 2030  Gross per 24 hour   Intake 1230 ml   Output 1000 ml   Net 230 ml          Physical Examination:     I had a face to face encounter with this patient and independently examined them on 12/23/2022 as outlined below:          Constitutional:  No acute distress, cooperative, pleasant    ENT:  Oral mucosa moist, oropharynx benign. Resp:  CTA bilaterally. No wheezing/rhonchi/rales. No accessory muscle use. CV:  Regular rhythm, normal rate, no murmurs, gallops, rubs    GI:  Soft, non distended, non tender. normoactive bowel sounds, no hepatosplenomegaly     Musculoskeletal:    No edema, warm, 2+ pulses throughout. Right hip dressing c/d/I. Thigh/calf soft. Distal pulses intact    Neurologic:  Moves all extremities.   AAOx3, CN II-XII reviewed            Data Review:    Review and/or order of clinical lab test  Review and/or order of tests in the radiology section of CPT  Review and/or order of tests in the medicine section of CPT      Labs:     Recent Labs     12/23/22  0517 12/21/22 1948   WBC 8.8 14.3*   HGB 10.7* 12.5   HCT 33.3* 38.5    390       Recent Labs     12/23/22  0517 12/21/22 1948    141   K 4.1 3.0*    102   CO2 28 25   BUN 14 13   CREA 1.12* 1.07*   * 154*   CA 7.7* 9.3       Recent Labs     12/21/22 1948   ALT 25      TBILI 0.5   TP 7.3   ALB 3.8   GLOB 3.5       Recent Labs     12/21/22 1948   INR 1.0   PTP 9.6        No results for input(s): FE, TIBC, PSAT, FERR in the last 72 hours. Lab Results   Component Value Date/Time    Folate >24.0 (H) 08/03/2009 03:59 PM        No results for input(s): PH, PCO2, PO2 in the last 72 hours. No results for input(s): CPK, CKNDX, TROIQ in the last 72 hours.     No lab exists for component: CPKMB  Lab Results   Component Value Date/Time    Cholesterol, total 142 10/14/2022 10:06 AM    HDL Cholesterol 74 10/14/2022 10:06 AM    LDL, calculated 55.4 10/14/2022 10:06 AM    Triglyceride 63 10/14/2022 10:06 AM    CHOL/HDL Ratio 1.9 10/14/2022 10:06 AM     Lab Results   Component Value Date/Time    Glucose (POC) 144 (H) 12/23/2022 06:54 AM    Glucose (POC) 145 (H) 12/22/2022 10:07 PM    Glucose (POC) 139 (H) 12/22/2022 03:33 PM    Glucose (POC) 136 (H) 12/22/2022 01:57 PM    Glucose (POC) 142 (H) 12/22/2022 11:29 AM    Glucose (POC) 120 (H) 12/22/2022 07:06 AM     Lab Results   Component Value Date/Time    Color YELLOW/STRAW 10/16/2022 02:23 PM    Appearance CLEAR 10/16/2022 02:23 PM    Specific gravity 1.014 10/16/2022 02:23 PM    pH (UA) 5.0 10/16/2022 02:23 PM    Protein Negative 10/16/2022 02:23 PM    Glucose Negative 10/16/2022 02:23 PM    Ketone TRACE (A) 10/16/2022 02:23 PM    Bilirubin Negative 10/16/2022 02:23 PM    Urobilinogen 0.2 10/16/2022 02:23 PM    Nitrites Negative 10/16/2022 02:23 PM    Leukocyte Esterase SMALL (A) 10/16/2022 02:23 PM    Epithelial cells FEW 10/16/2022 02:23 PM    Bacteria Negative 10/16/2022 02:23 PM    WBC 5-10 10/16/2022 02:23 PM    RBC 0-5 10/16/2022 02:23 PM         Medications Reviewed:     Current Facility-Administered Medications   Medication Dose Route Frequency    atorvastatin (LIPITOR) tablet 40 mg  40 mg Oral DAILY    busPIRone (BUSPAR) tablet 10 mg  10 mg Oral TID    traZODone (DESYREL) tablet 50 mg  50 mg Oral QHS    DULoxetine (CYMBALTA) capsule 60 mg  60 mg Oral DAILY    losartan/hydroCHLOROthiazide (HYZAAR) 50/12.5 mg   Oral DAILY    hydrALAZINE (APRESOLINE) 20 mg/mL injection 20 mg 20 mg IntraVENous Q6H PRN    oxyCODONE IR (ROXICODONE) tablet 10 mg  10 mg Oral Q4H PRN    HYDROmorphone (DILAUDID) injection 1 mg  1 mg IntraVENous Q6H PRN    acetaminophen (TYLENOL) tablet 1,000 mg  1,000 mg Oral Q8H    sodium chloride (NS) flush 5-40 mL  5-40 mL IntraVENous Q8H    sodium chloride (NS) flush 5-40 mL  5-40 mL IntraVENous PRN    ondansetron (ZOFRAN ODT) tablet 4 mg  4 mg Oral Q8H PRN    Or    ondansetron (ZOFRAN) injection 4 mg  4 mg IntraVENous Q6H PRN    0.9% sodium chloride infusion  75 mL/hr IntraVENous CONTINUOUS    polyethylene glycol (MIRALAX) packet 17 g  17 g Oral DAILY    senna-docusate (PERICOLACE) 8.6-50 mg per tablet 1 Tablet  1 Tablet Oral BID    naloxone (NARCAN) injection 0.4 mg  0.4 mg IntraVENous EVERY 2 MINUTES AS NEEDED    0.9% sodium chloride infusion  125 mL/hr IntraVENous CONTINUOUS    sodium chloride (NS) flush 5-40 mL  5-40 mL IntraVENous Q8H    sodium chloride (NS) flush 5-40 mL  5-40 mL IntraVENous PRN    traMADoL (ULTRAM) tablet 50 mg  50 mg Oral Q6H PRN    HYDROmorphone (DILAUDID) injection 0.5 mg  0.5 mg IntraVENous Q4H PRN    hydrOXYzine HCL (ATARAX) tablet 10 mg  10 mg Oral Q8H PRN    naloxone (NARCAN) injection 0.4 mg  0.4 mg IntraVENous PRN    calcium-vitamin D (OS-ONESIMO +D3) 500 mg-200 unit per tablet 1 Tablet  1 Tablet Oral TID WITH MEALS    polyethylene glycol (MIRALAX) packet 17 g  17 g Oral DAILY    [START ON 12/24/2022] bisacodyL (DULCOLAX) suppository 10 mg  10 mg Rectal DAILY PRN    ketorolac (TORADOL) injection 15 mg  15 mg IntraVENous Q6H    aspirin delayed-release tablet 81 mg  81 mg Oral BID     ______________________________________________________________________  EXPECTED LENGTH OF STAY: - - -  ACTUAL LENGTH OF STAY:          2                 Gay Osorio V NP

## 2022-12-23 NOTE — PROGRESS NOTES
FUNCTIONAL STATUS PRIOR TO ADMISSION: Patient was independent and active without use of DME.    HOME SUPPORT PRIOR TO ADMISSION: The patient lived alone and did not require assist - has friends/neighbors. Physical Therapy Goals  Initiated 12/23/2022  1. Patient will move from supine to sit and sit to supine  and scoot up and down in bed with modified independence within 7 day(s). 2.  Patient will transfer from bed to chair and chair to bed with modified independence using the least restrictive device within 7 day(s). 3.  Patient will perform sit to stand with modified independence within 7 day(s). 4.  Patient will ambulate with modified independence for > 150 feet with the least restrictive device within 7 day(s). 5.  Patient will ascend/descend 4 stairs with one handrail(s) with supervision within 7 day(s). PHYSICAL THERAPY EVALUATION  Patient: Braden Willis (89 y.o. female)  Date: 12/23/2022  Primary [facility]: [facility] [S72.009A]  Procedure(s) (LRB):  RIGHT BIPOLAR HIP ANTERIOR APPROACH (Right) 1 Day Post-Op   Precautions:   WBAT    ASSESSMENT  Based on the objective data described below, the patient presents POD# 1 Right bipolar hip anterior approac with pain right hip, decreased AROM/strength and function right leg, decline in functional mobility, decreased activity tolerance and impaired standing balance/gait with RW. Pt mobilized easily without c/o dizziness. Anticipate pt will require 2 - 4  additional PT sessions to increase amb distance, exercise and progress to stairs. Pt considering hiring paid caregiver if needed when cleared for discharge. Current Level of Function Impacting Discharge (mobility/balance): Sit to/from stand, amb with RW - CGA to min assist x 1    Functional Outcome Measure: The patient scored 60/100 on the Barthel Index outcome measure.      Other factors to consider for discharge: will need RW      Patient will benefit from skilled therapy intervention to address the above noted impairments. PLAN :  Recommendations and Planned Interventions: bed mobility training, transfer training, gait training, therapeutic exercises, patient and family training/education, and therapeutic activities      Frequency/Duration: Patient will be followed by physical therapy:  twice daily to address goals. Recommendation for discharge: (in order for the patient to meet his/her long term goals)  Physical therapy at least 2 days/week in the home  - if pt able to secure additional assistance in home    This discharge recommendation:  Has been made in collaboration with the attending provider and/or case management    IF patient discharges home will need the following DME: rolling walker - order placed         SUBJECTIVE:   Patient stated I want to go home today .     OBJECTIVE DATA SUMMARY:   HISTORY:    Past Medical History:   Diagnosis Date    Arthritis     Chronic pain     Diabetes ([facility]     Essential hypertension, malignant 4/6/2011    Hypertension     MI, old 2017    Pre-operative cardiovascular examination 4/6/2011    Psychiatric disorder     DEPRESSION/ANXIETY    PUD (peptic ulcer disease)     1970S    Pure hypercholesterolemia 4/6/2011    Type II or unspecified type diabetes mellitus without mention of complication, not stated as uncontrolled 4/6/2011     Past Surgical History:   Procedure Laterality Date    HX DILATION AND CURETTAGE      HX HEENT      EARS AS CHILD    HX SALPINGO-OOPHORECTOMY      HX TONSILLECTOMY      NEUROLOGICAL PROCEDURE UNLISTED      CERV.  FUSION    [place] ABDOMEN SURGERY PROC UNLISTED      BOWEL OBSTRUCTION X2    [place] ABDOMEN SURGERY PROC UNLISTED      GASTRIC BYPASS    [place] ABDOMEN SURGERY PROC UNLISTED      LAP SEBASTIÁN    [place] ABDOMEN SURGERY PROC UNLISTED      HERNIA        Personal factors and/or comorbidities impacting plan of care: as above    Home Situation  Home Environment: Private residence  # Steps to Enter: 1  Rails to Enter: No  One/Two Story Residence: Two story (can stay on first floor recliner ; 1/2 bath first floor)  # of Interior Steps: 14 (7 steps landing 7 steps)  [employer]: Both (can reach both at same time)  Living Alone: Yes  Support Systems: Friend/Neighbor  Patient Expects to be Discharged to[de-identified] Home with home health  Current DME Used/Available at Home: Cane, straight  Tub or Shower Type: Shower    EXAMINATION/PRESENTATION/DECISION MAKING:   Critical Behavior:  Neurologic State: Alert  Orientation Level: Oriented X4  Cognition: Follows commands, Appropriate for age attention/concentration, Appropriate safety awareness, Appropriate decision making  Safety/Judgement: Awareness of environment, Fall prevention, Home safety, Decreased awareness of need for assistance  Hearing: Auditory  Auditory Impairment: None  Range Of Motion:  AROM: Within functional limits (except right hip)                       Strength:    Strength: Within functional limits (except right hip)                    Tone & Sensation:   Tone: Normal              Sensation: Intact               Coordination:  Coordination: Within functional limits  Functional Mobility:  Bed Mobility:     Supine to Sit: Moderate assistance; Additional time;Assist x1     Scooting: Minimum assistance  Transfers:  Sit to Stand: Contact guard assistance;Assist x1  Stand to Sit: Contact guard assistance;Assist x1        Bed to Chair: Minimum assistance;Contact guard assistance; Adaptive equipment;Assist x1              Balance:   Sitting: Impaired; Without support  Sitting - Static: Good (unsupported)  Sitting - Dynamic: Not tested (secondary to pain with reaching)  Standing: Impaired; With support  Standing - Static: Good;Constant support  Standing - Dynamic : Fair;Constant support  Ambulation/Gait Training:  Distance (ft): 20 Feet (ft)  Assistive Device: Walker, rolling;Gait belt  Ambulation - Level of Assistance: Contact guard assistance        Gait Abnormalities: Decreased step clearance  Right Side Weight Bearing: As tolerated  Left Side Weight Bearing: Full  Base of Support: Center of gravity altered;Shift to left  Stance: Right decreased  Speed/Laura: Slow  Step Length: Right shortened;Left shortened  Swing Pattern: Right asymmetrical     Therapeutic Exercises:   Pt instructed and performed ankle pumps and demonstrated proper use of incentive spirometer - instructed to perform 10 reps once hr when awake. Pt instructed and demonstrated quad sets, gluteal sets and heel slides - to be performed 3 - 5 reps once hr as tolerated when awake. Written instructions provided on pt's communication board. Functional Measure:  Barthel Index:    Bathin  Bladder: 10  Bowels: 10  Groomin  Dressin  Feeding: 10  Mobility: 5  Stairs: 0  Toilet Use: 5  Transfer (Bed to Chair and Back): 10  Total: 60/100       The Barthel ADL Index: Guidelines  1. The index should be used as a record of what a patient does, not as a record of what a patient could do. 2. The main aim is to establish degree of independence from any help, physical or verbal, however minor and for whatever reason. 3. The need for supervision renders the patient not independent. 4. A patient's performance should be established using the best available evidence. Asking the patient, friends/relatives and nurses are the usual sources, but direct observation and common sense are also important. However direct testing is not needed. 5. Usually the patient's performance over the preceding 24-48 hours is important, but occasionally longer periods will be relevant. 6. Middle categories imply that the patient supplies over 50 per cent of the effort. 7. Use of aids to be independent is allowed. Score Interpretation (from [address] [place] [address])    Independent   60-79 Minimally independent   40-59 Partially dependent   20-39 Very dependent   <20 Totally dependent     -Joseph Beaver., Barthel, D.W. (1965).  Functional evaluation: the Barthel Index. [address] [facility] ([address] (1997). The Barthel activities of daily living index: self-reporting versus actual performance in the old (> or = 75 years). Journal of [address] [place] [address] 45(7), [facility], VIRGILIO, Lana Benavidez., Devi Beltran. (1999). Measuring the change in disability after inpatient rehabilitation; comparison of the responsiveness of the Barthel Index and Functional [place] Measure. Journal of Neurology, Neurosurgery, and Psychiatry, 66(4), 907-166. SOHA Bassett, RAJANI Hoskins, & Bay Collins M.A. (2004) Assessment of post-stroke quality of life in cost-effectiveness studies: The usefulness of the Barthel Index and the EuroQoL-5D. Quality of Life Research, 15, 263-28           Physical Therapy Evaluation Charge Determination   History Examination Presentation Decision-Making   LOW Complexity : Zero comorbidities / personal factors that will impact the outcome / POC LOW Complexity : 1-2 Standardized tests and measures addressing body structure, function, activity limitation and / or participation in recreation  LOW Complexity : Stable, uncomplicated  LOW Complexity : FOTO score of       Based on the above components, the patient evaluation is determined to be of the following complexity level: LOW     Pain Rating:  Right hip 6/10    Activity Tolerance:   Good - POD# 1    After treatment patient left in no apparent distress:   Sitting in chair, Call bell within reach, and ice applied to right hip    COMMUNICATION/EDUCATION:   The patients plan of care was discussed with: Registered nurse and Case management. Fall prevention education was provided and the patient/caregiver indicated understanding., Patient/family have participated as able in goal setting and plan of care. , and Patient/family agree to work toward stated goals and plan of care.     Thank you for this referral.  Kerry Harrison, PT   Time Calculation: 35 mins

## 2022-12-23 NOTE — PROGRESS NOTES
Problem: Self Care Deficits Care Plan (Adult)  Goal: *Acute Goals and Plan of Care (Insert Text)  Description: FUNCTIONAL STATUS PRIOR TO ADMISSION: Patient was independent and active without use of DME.    HOME SUPPORT: The patient lived alone with no assistance. Occupational Therapy Goals  Initiated 12/23/2022  1. Patient will perform lower body ADLs with AE supervision/set-up within 4 day(s). 2.  Patient will perform upper body ADLs standing 5 mins without fatigue or LOB with supervision/set-up within 4 day(s). 3.  Patient will perform toilet transfer with supervision/set-up within 4 day(s). 4.  Patient will perform all aspects of toileting with supervision/set-up within 4 day(s). 5.  Patient will participate in upper extremity therapeutic exercise/activities with supervision/set-up for 10 minutes within 4 day(s). 6.  Patient will utilize energy conservation techniques during functional activities without cues within 4 day(s). Outcome: Not Met       OCCUPATIONAL THERAPY EVALUATION  Patient: Leandro Kiser (36 y.o. female)  Date: 12/23/2022  Primary Diagnosis: Hip fracture ([address] [S72.009A]  Procedure(s) (LRB):  RIGHT BIPOLAR HIP ANTERIOR APPROACH (Right) 1 Day Post-Op   Precautions:   WBAT    ASSESSMENT  Based on the objective data described below, the patient presents with decreased independence in all basic ADL's and mobility due to above sx. Demonstrated good activity tolerance however limited by pain, decreased balance and decreased endurance for daily routine. Patient eager to return home however lives alone and without local support. Recommend instruction on use of hip kit prn tomorrow. Current Level of Function Impacting Discharge (ADLs/self-care): max assist LE ADLs, min assist toileting, CGA to min assist transfers    Functional Outcome Measure: The patient scored 60/100 on the Barthel Index outcome measure which is indicative of minimal independence.       Other factors to consider for discharge: mentioned a male friend across the street however though further conversation does not feel comfortable with him and not appropriate to assist as she stated \"he always tries to hug me\"     Educated on potential of hiring aides and informed      Patient will benefit from skilled therapy intervention to address the above noted impairments. PLAN :  Recommendations and Planned Interventions: self care training, functional mobility training, therapeutic exercise, balance training, therapeutic activities, endurance activities, patient education, home safety training, and family training/education    Frequency/Duration: Patient will be followed by occupational therapy 5 times a week to address goals. Recommendation for discharge: (in order for the patient to meet his/her long term goals)  Occupational therapy at least 2 days/week in the home AND ensure assist and/or supervision for safety with ADL's, IADLs and mobility    This discharge recommendation:  Has been made in collaboration with the attending provider and/or case management    IF patient discharges home will need the following DME: ? Hip kit, rolling walker ordered this date       SUBJECTIVE:   Patient stated I don't want to fall again.     OBJECTIVE DATA SUMMARY:   HISTORY:   Past Medical History:   Diagnosis Date    Arthritis     Chronic pain     Diabetes ([facility] [address]     Essential hypertension, malignant 4/6/2011    Hypertension     MI, old 2017    Pre-operative cardiovascular examination 4/6/2011    Psychiatric disorder     DEPRESSION/ANXIETY    PUD (peptic ulcer disease)     1970S    Pure hypercholesterolemia 4/6/2011    Type II or unspecified type diabetes mellitus without mention of complication, not stated as uncontrolled 4/6/2011     Past Surgical History:   Procedure Laterality Date    HX DILATION AND CURETTAGE      HX HEENT      EARS AS CHILD    HX SALPINGO-OOPHORECTOMY      HX TONSILLECTOMY      NEUROLOGICAL PROCEDURE UNLISTED      CERV. FUSION    VA ABDOMEN SURGERY PROC UNLISTED      BOWEL OBSTRUCTION X2    VA ABDOMEN SURGERY PROC UNLISTED      GASTRIC BYPASS    VA ABDOMEN SURGERY PROC UNLISTED      LAP SEBASTIÁN    VA ABDOMEN SURGERY PROC UNLISTED      HERNIA        Expanded or extensive additional review of patient history:     Home Situation  Home Environment: Private residence  # Steps to Enter: 1  Rails to Enter: No  One/Two Story Residence: Two story (can stay on first floor recliner ; 1/2 bath first floor)  # of Interior Steps: 14 (7 steps landing 7 steps)  [employer]: Both (can reach both at same time)  Living Alone: Yes  Support Systems: Friend/Neighbor  Patient Expects to be Discharged to[de-identified] Home  Current DME Used/Available at Home: Cane, straight  Tub or Shower Type: Shower    Hand dominance: Right    EXAMINATION OF PERFORMANCE DEFICITS:  Cognitive/Behavioral Status:  Neurologic State: Alert  Orientation Level: Oriented X4  Cognition: Follows commands; Appropriate for age attention/concentration; Appropriate safety awareness; Appropriate decision making  Perception: Appears intact  Perseveration: No perseveration noted  Safety/Judgement: Awareness of environment; Fall prevention;Home safety;Decreased awareness of need for assistance    Skin: dressing intact    Edema: minimal right LE    Hearing: Auditory  Auditory Impairment: None    Vision/Perceptual:            No glasses per patient     Range of Motion:  WFL bilateral UE        Strength:  Generally decreased, functional                   Coordination:     Fine Motor Skills-Upper: Right Intact; Left Intact    Gross Motor Skills-Upper: Left Intact; Right Intact         Balance:  Sitting: Intact; Without support  Standing: Impaired; With support    Functional Mobility and Transfers for ADLs:  Bed Mobility:  Supine to Sit: Moderate assistance; Additional time;Assist x1  Scooting: Minimum assistance    Transfers:  Sit to Stand: Contact guard assistance; Adaptive equipment;Assist x1  Stand to Sit: Contact guard assistance; Additional time;Assist x1  Bed to Chair: Minimum assistance;Contact guard assistance; Adaptive equipment;Assist x1  Bathroom Mobility: Contact guard assistance  Toilet Transfer : Minimum assistance;Contact guard assistance; Adaptive equipment; Additional time;Assist x1    ADL Assessment:  Feeding: Independent    Oral Facial Hygiene/Grooming: Modified Independent         Type of Bath: Chlorhexidine (CHG); Partial (krause care)    Upper Body Dressing: Setup    Lower Body Dressing: Maximum assistance    Toileting: Minimum assistance                  ADL Intervention and task modifications:           Educated on role of OT, home safety, fall prevention, need for increased assist at discharge. Educated on positioning of her right LE for functional transfers    Educated on use of call bell, need for assist at this time          Patient instructed and indicated understanding the benefits of maintaining activity tolerance, functional mobility, and independence with self care tasks during acute stay  to ensure safe return home and to baseline. Encouraged patient to increase frequency and duration OOB, be out of bed for all meals, perform daily ADLs (as approved by RN/MD regarding bathing etc), and performing functional mobility to/from bathroom. Cognitive Retraining  Safety/Judgement: Awareness of environment; Fall prevention;Home safety;Decreased awareness of need for assistance      Therapeutic Exercise: Instructed to perform ankle pumps throughout day     Functional Measure:    Barthel Index:  Bathin  Bladder: 10  Bowels: 10  Groomin  Dressin  Feeding: 10  Mobility: 5  Stairs: 0  Toilet Use: 5  Transfer (Bed to Chair and Back): 10  Total: 60/100      The Barthel ADL Index: Guidelines  1. The index should be used as a record of what a patient does, not as a record of what a patient could do.   2. The main aim is to establish degree of independence from any help, physical or verbal, however minor and for whatever reason. 3. The need for supervision renders the patient not independent. 4. A patient's performance should be established using the best available evidence. Asking the patient, friends/relatives and nurses are the usual sources, but direct observation and common sense are also important. However direct testing is not needed. 5. Usually the patient's performance over the preceding 24-48 hours is important, but occasionally longer periods will be relevant. 6. Middle categories imply that the patient supplies over 50 per cent of the effort. 7. Use of aids to be independent is allowed. Score Interpretation (from [facility])    Independent   60-79 Minimally independent   40-59 Partially dependent   20-39 Very dependent   <20 Totally dependent     -Joseph Beaver., Barthel, D.W. (1965). Functional evaluation: the Barthel Index. [facility] ([address] (1997). The Barthel activities of daily living index: self-reporting versus actual performance in the old (> or = 75 years). Journal [address] 45(7), 14 [facility], VIRGILIO, Armand Angela., Joann Malave. (1999). Measuring the change in disability after inpatient rehabilitation; comparison of the responsiveness of the Barthel Index and Functional [place] Measure. Journal of Neurology, Neurosurgery, and Psychiatry, 66(4), 597-128. SOHA Torres, RAJANI Hoskins, & Bharathi Sierra MChenA. (2004) Assessment of post-stroke quality of life in cost-effectiveness studies: The usefulness of the Barthel Index and the EuroQoL-5D.  Quality of Life Research, 15, 017-41         Occupational Therapy Evaluation Charge Determination   History Examination Decision-Making   LOW Complexity : Brief history review  LOW Complexity : 1-3 performance deficits relating to physical, cognitive , or psychosocial skils that result in activity limitations and / or participation restrictions  MEDIUM Complexity : Patient may present with comorbidities that affect occupational performnce. Miniml to moderate modification of tasks or assistance (eg, physical or verbal ) with assesment(s) is necessary to enable patient to complete evaluation       Based on the above components, the patient evaluation is determined to be of the following complexity level: LOW   Pain Rating:  Rated 6/10, however stated \"I'm not in much pain\"     Activity Tolerance:   Good    After treatment patient left in no apparent distress:    Sitting in chair, Call bell within reach, and Bed / chair alarm activated    COMMUNICATION/EDUCATION:   The patients plan of care was discussed with: Physical therapist, Registered nurse, and Case management. Home safety education was provided and the patient/caregiver indicated understanding. and Patient/family have participated as able in goal setting and plan of care. This patients plan of care is appropriate for delegation to [facility].     Thank you for this referral.  Andrea Mcdowell OTR/L

## 2022-12-23 NOTE — PROGRESS NOTES
Resting comfortably. GEN:  NAD. AOx3   ABD:  S/NT/ND   RLE:  Dressing C/D/I , 5/5 motor, Calf nttp (Bilat), Sensation grossly intact to light touch throughout, 1+ dp/pt pulses, foot perfused    Patient Vitals for the past 24 hrs:   Temp Pulse Resp BP SpO2   12/23/22 0546 -- 65 -- -- --   12/23/22 0348 -- 66 -- -- --   12/23/22 0312 98.8 °F (37.1 °C) 83 16 115/66 96 %   12/23/22 0156 -- 68 -- -- --   12/22/22 2146 -- 83 -- -- --   12/22/22 2030 100 °F (37.8 °C) 77 18 (!) 163/81 97 %   12/22/22 1948 -- 87 -- -- --   12/22/22 1851 99.5 °F (37.5 °C) 82 -- 130/73 97 %   12/22/22 1715 98 °F (36.7 °C) 78 15 (!) 121/51 97 %   12/22/22 1700 -- 77 16 (!) 113/59 96 %   12/22/22 1650 -- 78 15 (!) 118/52 96 %   12/22/22 1645 98 °F (36.7 °C) 76 15 (!) 114/50 96 %   12/22/22 1630 -- 64 13 (!) 151/66 96 %   12/22/22 1620 -- 76 20 (!) 166/87 96 %   12/22/22 1615 -- 75 19 (!) 171/57 97 %   12/22/22 1600 -- 74 15 (!) 147/92 96 %   12/22/22 1545 -- 73 14 137/62 97 %   12/22/22 1540 -- 75 15 (!) 142/63 97 %   12/22/22 1535 -- 77 17 138/61 99 %   12/22/22 1532 98.6 °F (37 °C) 78 14 (!) 137/58 100 %   12/22/22 1204 -- 71 -- -- --   12/22/22 1024 -- 76 -- 128/76 --   12/22/22 1023 97 °F (36.1 °C) 76 16 127/77 96 %   12/22/22 1003 -- 75 -- -- --       Current Facility-Administered Medications   Medication Dose Route Frequency    atorvastatin (LIPITOR) tablet 40 mg  40 mg Oral DAILY    busPIRone (BUSPAR) tablet 10 mg  10 mg Oral TID    traZODone (DESYREL) tablet 50 mg  50 mg Oral QHS    . PHARMACY TO SUBSTITUTE PER PROTOCOL (Reordered from: DULOXETINE HCL (CYMBALTA PO))    Per Protocol    losartan/hydroCHLOROthiazide (HYZAAR) 50/12.5 mg   Oral DAILY    hydrALAZINE (APRESOLINE) 20 mg/mL injection 20 mg  20 mg IntraVENous Q6H PRN    oxyCODONE IR (ROXICODONE) tablet 10 mg  10 mg Oral Q4H PRN    HYDROmorphone (DILAUDID) injection 1 mg  1 mg IntraVENous Q6H PRN    acetaminophen (TYLENOL) tablet 1,000 mg  1,000 mg Oral Q8H    sodium chloride (NS) flush 5-40 mL  5-40 mL IntraVENous Q8H    sodium chloride (NS) flush 5-40 mL  5-40 mL IntraVENous PRN    ondansetron (ZOFRAN ODT) tablet 4 mg  4 mg Oral Q8H PRN    Or    ondansetron (ZOFRAN) injection 4 mg  4 mg IntraVENous Q6H PRN    0.9% sodium chloride infusion  75 mL/hr IntraVENous CONTINUOUS    polyethylene glycol (MIRALAX) packet 17 g  17 g Oral DAILY    senna-docusate (PERICOLACE) 8.6-50 mg per tablet 1 Tablet  1 Tablet Oral BID    naloxone (NARCAN) injection 0.4 mg  0.4 mg IntraVENous EVERY 2 MINUTES AS NEEDED    metFORMIN (GLUCOPHAGE) tablet 500 mg  500 mg Oral BID WITH MEALS    0.9% sodium chloride infusion  125 mL/hr IntraVENous CONTINUOUS    sodium chloride (NS) flush 5-40 mL  5-40 mL IntraVENous Q8H    sodium chloride (NS) flush 5-40 mL  5-40 mL IntraVENous PRN    traMADoL (ULTRAM) tablet 50 mg  50 mg Oral Q6H PRN    HYDROmorphone (DILAUDID) injection 0.5 mg  0.5 mg IntraVENous Q4H PRN    hydrOXYzine HCL (ATARAX) tablet 10 mg  10 mg Oral Q8H PRN    naloxone (NARCAN) injection 0.4 mg  0.4 mg IntraVENous PRN    calcium-vitamin D (OS-ONESIMO +D3) 500 mg-200 unit per tablet 1 Tablet  1 Tablet Oral TID WITH MEALS    polyethylene glycol (MIRALAX) packet 17 g  17 g Oral DAILY    [START ON 12/24/2022] bisacodyL (DULCOLAX) suppository 10 mg  10 mg Rectal DAILY PRN    ketorolac (TORADOL) injection 15 mg  15 mg IntraVENous Q6H    aspirin delayed-release tablet 81 mg  81 mg Oral BID       Lab Results   Component Value Date/Time    HGB 10.7 (L) 12/23/2022 05:17 AM    INR 1.0 12/21/2022 07:48 PM       Lab Results   Component Value Date/Time    Sodium 138 12/23/2022 05:17 AM    Potassium 4.1 12/23/2022 05:17 AM    Chloride 108 12/23/2022 05:17 AM    CO2 28 12/23/2022 05:17 AM    BUN 14 12/23/2022 05:17 AM    Creatinine 1.12 (H) 12/23/2022 05:17 AM    Calcium 7.7 (L) 12/23/2022 05:17 AM    Magnesium 2.1 10/14/2022 04:56 AM            81 y.o. female s/p right direct anterior anaya  hip arthroplasty secondary to fracture on 12/22/2022  . Doing well. Precautions: None  ABX: Complete 24 hours perioperative abx  PATHWAY: Straight cath per protocol  DVT Prophylaxis: ASA 81 mg enteric coated BID  Weight Bearing: WBAT RLE   Disposition: Cleared from ortho stand point once cleared by PT, HHPT as able.

## 2022-12-23 NOTE — PROGRESS NOTES
Spiritual Care Assessment/Progress Note  [facility]      NAME: Claudine Goodpasture      MRN: [id number]  AGE: 81 y.o.  SEX: female  Presybeterian Affiliation: Caodaism   Language: English     12/23/2022     Total Time (in minutes): 52     Spiritual Assessment begun in [facility] through conversation with:         [x]Patient        [] Family    [] Friend(s)        Reason for Consult: Request by staff     Spiritual beliefs: (Please include comment if needed)     [] Identifies with a hilaria tradition:         [] Supported by a hilaria community:            [] Claims no spiritual orientation:           [] Seeking spiritual identity:                [] Adheres to an individual form of spirituality:           [x] Not able to assess:                           Identified resources for coping:      [] Prayer                               [] Music                  [] Guided Imagery     [x] Family/friends                 [] Pet visits     [] Devotional reading                         [] Unknown     [] Other:                                               Interventions offered during this visit: (See comments for more details)    Patient Interventions: Advance medical directive consult, Affirmation of emotions/emotional suffering, Catharsis/review of pertinent events in supportive environment, Coping skills reviewed/reinforced, Normalization of emotional/spiritual concerns           Plan of Care:     [] Support spiritual and/or cultural needs    [] Support AMD and/or advance care planning process      [] Support grieving process   [] Coordinate Rites and/or Rituals    [] Coordination with community clergy   [] No spiritual needs identified at this time   [] Detailed Plan of Care below (See Comments)  [] Make referral to Music Therapy  [] Make referral to Pet Therapy     [] Make referral to Addiction services  [] Make referral to [facility]  [] Make referral to Spiritual Care Partner  [] No future visits requested [x] Follow up visits as needed     Visited her in response to a request from nurse. In conversation she noted that she is a , her   in April of this year. The couple were  some 46 years and the grief remains strong. She has one surviving sister from whom she is estranged; the sisters do not speak to each other. She lives alone and hopes to be discharged to her home.    Chaplain Kline MDiv, MS, [facility]

## 2022-12-23 NOTE — PROGRESS NOTES
Problem: Falls - Risk of  Goal: *Absence of Falls  Description: Document Dennie Oak Fall Risk and appropriate interventions in the flowsheet.   12/23/2022 0443 by Fran James RN  Outcome: Progressing Towards Goal  Note: Fall Risk Interventions:                             12/23/2022 0442 by Fran James RN  Outcome: Progressing Towards Goal  Note: Fall Risk Interventions:                                Problem: Patient Education: Go to Patient Education Activity  Goal: Patient/Family Education  12/23/2022 0443 by Fran James RN  Outcome: Progressing Towards Goal  12/23/2022 0442 by Fran James RN  Outcome: Progressing Towards Goal     Problem: Pain  Goal: *Control of Pain  Outcome: Progressing Towards Goal  Goal: *PALLIATIVE CARE:  Alleviation of Pain  Outcome: Progressing Towards Goal

## 2022-12-23 NOTE — PROGRESS NOTES
Bedside shift change report given to Johnnie Phillips RN (oncoming nurse) by [id number] Rem[address]t, RN (offgoing nurse). Report included the following information SBAR, Kardex, Intake/Output, and MAR.

## 2022-12-23 NOTE — PROGRESS NOTES
BRADEN: anticipate d/c home with [facility] & their Bridge to Home Program as well;    [facility] added to AVS    CM provided written information on [facility] as well as Private Caregivers  CM provided [facility] brochure for community resources    Pt's RW was delivered to room by Jeanne Pereira, [phone]    RUR: 14%  PT/OT recs. HH & RW  Reason for Admission:    S/p right direct anaya hip arthoplasty secodary to fracture on 12/22  History of: MI, CAD, MDD with [facility], NIDDM                   RUR Score:     14%                Plan for utilizing home health: [facility]      PCP: First and Last name:  None pt uses [facility]     Name of Practice:    Are you a current patient: Yes/No: YES   Approximate date of last visit: within last six months   Can you participate in a virtual visit with your PCP: YES                    Current Advanced Directive/Advance Care Plan: Full Code      Healthcare Decision Maker:   Click here to complete [address] including selection of the [address] Relationship (ie \"Primary\")         Linda [facility], [phone]                    Transition of Care Plan:                    1400-CM reviewed pt chart & met with pt at bedside to discuss transitional planning. Pt resides alone in a two story home with 2 DONALD and 15 steps to 2nd floor bedroom. Pt stated that she intends to stay on first floor and she has an electric recliner chair. Pt uses [facility] for meds with no copay concerns. CM confirmed pcp/demographics/insurance. Pt uses [facility] for [facility] to provide d/c transport. CM discussed HH and d/c plan as well as pt/ot recommendations and pt wants to go home. CM informed Liza Martel of pt intentions to go home with home health at d/c and she is agreeable to plan. CM discussed HH with pt and Agencies and pt would like [facility].  AVS updated after noting acceptance in careport. Cm to follow. Carmen Strickland RN BSN CCM  Transition of Care Plan:     The Plan for Transition of Care is related to the following treatment goals: [facility], [facility]    The Patient and/or patient representative  was provided with a choice of provider and agrees  with the discharge plan. Yes [x] No []    A Freedom of choice list was provided with basic dialogue that supports the patient's individualized plan of care/goals and shares the quality data associated with the providers.        Yes [x] No []

## 2022-12-23 NOTE — PROGRESS NOTES
Tried Initiate early mobilization but pt. Said she's too tired and will do Physical therapy Ron am. Pt. Kept monitored.

## 2022-12-23 NOTE — PROGRESS NOTES
Physical Therapy 12/23/2022    Returned for pm session - pt reporting up in chair > 3 hours and also had extended visit with friend - pt resting in bed - deferred OOB.   Recommended pt to limit time up in chair to 1 - 2 hrs and continue amb to bathroom with nursing assist.    Brenda Shaffer, PT

## 2022-12-24 LAB
ANION GAP SERPL CALC-SCNC: 3 MMOL/L (ref 5–15)
BUN SERPL-MCNC: 17 MG/DL (ref 6–20)
BUN/CREAT SERPL: 16 (ref 12–20)
CALCIUM SERPL-MCNC: 8 MG/DL (ref 8.5–10.1)
CHLORIDE SERPL-SCNC: 108 MMOL/L (ref 97–108)
CO2 SERPL-SCNC: 27 MMOL/L (ref 21–32)
CREAT SERPL-MCNC: 1.08 MG/DL (ref 0.55–1.02)
GLUCOSE BLD STRIP.AUTO-MCNC: 133 MG/DL (ref 65–117)
GLUCOSE BLD STRIP.AUTO-MCNC: 146 MG/DL (ref 65–117)
GLUCOSE BLD STRIP.AUTO-MCNC: 146 MG/DL (ref 65–117)
GLUCOSE BLD STRIP.AUTO-MCNC: 150 MG/DL (ref 65–117)
GLUCOSE SERPL-MCNC: 144 MG/DL (ref 65–100)
HGB BLD-MCNC: 10 G/DL (ref 11.5–16)
POTASSIUM SERPL-SCNC: 4.2 MMOL/L (ref 3.5–5.1)
SERVICE CMNT-IMP: ABNORMAL
SODIUM SERPL-SCNC: 138 MMOL/L (ref 136–145)

## 2022-12-24 PROCEDURE — 77030041034 HC KT HIP PATT -B

## 2022-12-24 PROCEDURE — 74011250637 HC RX REV CODE- 250/637: Performed by: ORTHOPAEDIC SURGERY

## 2022-12-24 PROCEDURE — 80048 BASIC METABOLIC PNL TOTAL CA: CPT

## 2022-12-24 PROCEDURE — 74011000250 HC RX REV CODE- 250: Performed by: ORTHOPAEDIC SURGERY

## 2022-12-24 PROCEDURE — 65270000046 HC RM TELEMETRY

## 2022-12-24 PROCEDURE — 36415 COLL VENOUS BLD VENIPUNCTURE: CPT

## 2022-12-24 PROCEDURE — 97530 THERAPEUTIC ACTIVITIES: CPT

## 2022-12-24 PROCEDURE — 85018 HEMOGLOBIN: CPT

## 2022-12-24 PROCEDURE — 82962 GLUCOSE BLOOD TEST: CPT

## 2022-12-24 PROCEDURE — 97116 GAIT TRAINING THERAPY: CPT

## 2022-12-24 PROCEDURE — 94760 N-INVAS EAR/PLS OXIMETRY 1: CPT

## 2022-12-24 PROCEDURE — 51798 US URINE CAPACITY MEASURE: CPT

## 2022-12-24 RX ADMIN — ATORVASTATIN CALCIUM 40 MG: 40 TABLET, FILM COATED ORAL at 11:53

## 2022-12-24 RX ADMIN — BUSPIRONE HYDROCHLORIDE 10 MG: 10 TABLET ORAL at 14:29

## 2022-12-24 RX ADMIN — ASPIRIN 81 MG: 81 TABLET, COATED ORAL at 21:28

## 2022-12-24 RX ADMIN — SODIUM CHLORIDE, PRESERVATIVE FREE 10 ML: 5 INJECTION INTRAVENOUS at 05:29

## 2022-12-24 RX ADMIN — SENNOSIDES AND DOCUSATE SODIUM 1 TABLET: 50; 8.6 TABLET ORAL at 21:28

## 2022-12-24 RX ADMIN — OYSTER SHELL CALCIUM WITH VITAMIN D 1 TABLET: 500; 200 TABLET, FILM COATED ORAL at 17:54

## 2022-12-24 RX ADMIN — HYDROCHLOROTHIAZIDE: 25 TABLET ORAL at 11:54

## 2022-12-24 RX ADMIN — POLYETHYLENE GLYCOL 3350 17 G: 17 POWDER, FOR SOLUTION ORAL at 09:00

## 2022-12-24 RX ADMIN — SODIUM CHLORIDE, PRESERVATIVE FREE 10 ML: 5 INJECTION INTRAVENOUS at 21:33

## 2022-12-24 RX ADMIN — BUSPIRONE HYDROCHLORIDE 10 MG: 10 TABLET ORAL at 21:29

## 2022-12-24 RX ADMIN — POLYETHYLENE GLYCOL 3350 17 G: 17 POWDER, FOR SOLUTION ORAL at 11:54

## 2022-12-24 RX ADMIN — OYSTER SHELL CALCIUM WITH VITAMIN D 1 TABLET: 500; 200 TABLET, FILM COATED ORAL at 11:53

## 2022-12-24 RX ADMIN — ASPIRIN 81 MG: 81 TABLET, COATED ORAL at 14:29

## 2022-12-24 RX ADMIN — TRAZODONE HYDROCHLORIDE 50 MG: 100 TABLET ORAL at 21:28

## 2022-12-24 RX ADMIN — ACETAMINOPHEN 1000 MG: 500 TABLET, FILM COATED ORAL at 15:06

## 2022-12-24 RX ADMIN — OYSTER SHELL CALCIUM WITH VITAMIN D 1 TABLET: 500; 200 TABLET, FILM COATED ORAL at 08:00

## 2022-12-24 RX ADMIN — SENNOSIDES AND DOCUSATE SODIUM 1 TABLET: 50; 8.6 TABLET ORAL at 11:54

## 2022-12-24 RX ADMIN — SODIUM CHLORIDE, PRESERVATIVE FREE 10 ML: 5 INJECTION INTRAVENOUS at 15:08

## 2022-12-24 RX ADMIN — ACETAMINOPHEN 1000 MG: 500 TABLET, FILM COATED ORAL at 21:29

## 2022-12-24 RX ADMIN — OXYCODONE 10 MG: 5 TABLET ORAL at 17:54

## 2022-12-24 RX ADMIN — ACETAMINOPHEN 1000 MG: 500 TABLET, FILM COATED ORAL at 05:28

## 2022-12-24 RX ADMIN — DULOXETINE HYDROCHLORIDE 60 MG: 30 CAPSULE, DELAYED RELEASE ORAL at 11:53

## 2022-12-24 NOTE — PROGRESS NOTES
Occupational Therapy  12/24/22    Attempted OT intervention at [phone], however patient with 5+ visitors at bedside. Delivered hip kit AE to room. Agreeable to OT at a later time. Will follow up when available.      Phyllistine Sample, OTR/L

## 2022-12-24 NOTE — PROGRESS NOTES
Problem: Mobility Impaired (Adult and Pediatric)  Goal: *Acute Goals and Plan of Care (Insert Text)  Description: FUNCTIONAL STATUS PRIOR TO ADMISSION: Patient was independent and active without use of DME.     HOME SUPPORT PRIOR TO ADMISSION: The patient lived alone and did not require assist - has friends/neighbors. Physical Therapy Goals  Initiated 12/23/2022  1. Patient will move from supine to sit and sit to supine  and scoot up and down in bed with modified independence within 7 day(s). 2.  Patient will transfer from bed to chair and chair to bed with modified independence using the least restrictive device within 7 day(s). 3.  Patient will perform sit to stand with modified independence within 7 day(s). 4.  Patient will ambulate with modified independence for > 150 feet with the least restrictive device within 7 day(s). 5.  Patient will ascend/descend 4 stairs with one handrail(s) with supervision within 7 day(s). Outcome: Progressing Towards Goal     PHYSICAL THERAPY TREATMENT  Patient: Leandro Kiser (89 y.o. female)  Date: 12/24/2022  Diagnosis: Hip fracture ([address] [S72.009A] <principal problem not specified>  Procedure(s) (LRB):  RIGHT BIPOLAR HIP ANTERIOR APPROACH (Right) 2 Days Post-Op  Precautions: WBAT  Chart, physical therapy assessment, plan of care and goals were reviewed. ASSESSMENT  Patient continues with skilled PT services and is progressing towards goals. Pt's pain increased POD# 2 - educated pt on importance of using ice and calling RN if pain medication needed - explained narcotics not scheduled. Pt reported up in chair x 3 hrs - again emphasized importance of up in chair for shorter durations throughout day. Pt attempted short distance amb - unable to tolerate pain and returned to bed - RN aware of above.      Current Level of Function Impacting Discharge (mobility/balance): Pain limiting pt's participation with functional mobility, CGA to min assist x 1 for transfers/amb    Other factors to consider for discharge: Lives alone; pt plans to hire private paid caregivers         PLAN :  Patient continues to benefit from skilled intervention to address the above impairments. Continue treatment per established plan of care. to address goals. Recommendation for discharge: (in order for the patient to meet his/her long term goals)  Physical therapy at least 2 days/week in the home AND ensure assist and/or supervision for safety with functional mobility - paid caregivers    This discharge recommendation:  Has been made in collaboration with the attending provider and/or case management    IF patient discharges home will need the following DME: RW has been delivered for discharge       SUBJECTIVE:   Patient stated I just can't do it - it just hurts too much.     OBJECTIVE DATA SUMMARY:   Critical Behavior:  Neurologic State: Alert  Orientation Level: Oriented X4  Cognition: Appropriate decision making  Safety/Judgement: Awareness of environment  Functional Mobility Training:  Bed Mobility:     Supine to Sit: Contact guard assistance  Sit to Supine:  (did not return to supine)  Scooting: Contact guard assistance        Transfers:  Sit to Stand: Contact guard assistance  Stand to Sit: Contact guard assistance        Bed to Chair: Contact guard assistance                    Balance:  Sitting: Intact; Without support  Sitting - Static: Good (unsupported)  Sitting - Dynamic: Fair (occasional) (limited by right hip pain)  Standing: Impaired; With support  Standing - Static: Good;Constant support  Standing - Dynamic : Fair;Constant support  Ambulation/Gait Training:  Distance (ft): 5 Feet (ft)  Assistive Device: Walker, rolling;Gait belt  Ambulation - Level of Assistance: Contact guard assistance        Gait Abnormalities: Decreased step clearance  Right Side Weight Bearing: As tolerated  Left Side Weight Bearing: Full  Base of Support: Center of gravity altered;Shift to left  Stance: Right decreased  Speed/Laura: Slow  Step Length: Right shortened;Left shortened  Swing Pattern: Right asymmetrical        Pain Rating: At rest sitting in recliner 8/10 - no ice in place; with weight bearing reporting 10/10; pt states she did not ask for pain medication    Activity Tolerance:   Fair  - limited by pain    After treatment patient left in no apparent distress:   Supine in bed, Call bell within reach, Side rails x 3, and ice applied to right hip    COMMUNICATION/COLLABORATION:   The patients plan of care was discussed with: Registered nurse.      Mauro Kocher, PT   Time Calculation: 15 mins

## 2022-12-24 NOTE — DISCHARGE INSTRUCTIONS
Post op Discharge Instructions Hip Fracture   [facility]  [phone]    Patient Name: Martha Whiting  Date of admission:  12/21/2022  Date of procedure: 12/22/2022   Procedure: Procedure(s):  RIGHT BIPOLAR HIP ANTERIOR APPROACH  PCP: @PCP@  Date of discharge: [unfilled]     Follow up office visit   See Dr. Joss Bejarano approximately 3-4 weeks from date of surgery. Call [phone] (ext [phone]) to make an appointment. Activity  Walk with your walker with weight bearing restrictions as instructed by your physical therapist.  Continue using your walker until seen for follow-up visit. You should walk daily with the physical therapist.  Perform your exercise routine 3 times a day as instructed by the physical therapist.    Incision Care  The Aquacel (brown, waterproof) surgical dressing is to remain on the hip for 7 days. On the 7th day gently peel the dressing off by carefully lifting the edge and stretching it slightly to break the adhesive seal.    If your Aquacel dressing comes loose/off before the 7th day, you may replace it with a dry sterile gauze dressing; change it daily. Once the incision is not draining, leave it open to air. You may take a shower with the Aquacel dressing in place. Once the Aquacel is removed,  may shower and get your incision wet but do not submerge your incision under water in a bath tub, hot tub or swimming pool for 6 weeks after surgery. Preventing blood clots  ASA 81mg BID daily for 4 weeks following your surgery date      Pain management  - Please take scheduled 650 mg tylenol every 6 hours for 6 weeks    - Please take tramadol every 6 hours for pain as needed for pain. - Avoid alcoholic beverages while taking pain medication  - Do not take any over-the-counter medication for pain except Tylenol (acetaminophen)  - Please be aware that many medications contain Tylenol.   We do not want you to over medicate so please read the information below as a guide. Do not take more than 4 Grams of Tylenol in a 24 hour  - You may place an ice bag on your knee for 15-20 minutes after exercising and as needed throughout the day and night      Diet  Resume usual diet; encourage fluids; provide foods high in fiber, calcium and vitamin D  Provide stool softeners/laxatives as needed      After [address] for SNF/Rehab (to be followed by post-acute provider)    Nursing  Complete head to toe assessment, vital signs  Medication reconciliation  Review pain management  Manage chronic medical conditions  Remove Aquacel dressing 7 days after surgery    Physical Therapy-status at discharge from the hospital    Weight bearing status:  Precautions at Admission: WBAT  Left Side Weight Bearing: Full  Right Side Weight Bearing: As tolerated    Mobility Status:  Supine to Sit: Moderate assistance, Additional time, Assist x1  Sit to Stand: Contact guard assistance, Assist x1     Bed to Chair: Minimum assistance, Contact guard assistance, Adaptive equipment, Assist x1    Gait:  Distance (ft): 20 Feet (ft)  Ambulation - Level of Assistance: Contact guard assistance  Assistive Device: Walker, rolling, Gait belt  Gait Abnormalities: Decreased step clearance    ADL status overall composite: Toilet Transfer : Minimum assistance, Contact guard assistance, Adaptive equipment, Additional time, Assist x1    Physical Therapy-exercises, transfers, gait-training (partial weight bearing on the surgical leg)    Exercises related to strengthening the surgical hip:  Supine Exercises: Ankle pumps  Quad Sets  Gluteal Sets  Hip Abduction slides supine  Hip external rotation  Heel Slides    Standing Exercises:  Heel Raises  Mini-squats  Heel/toe touches and knee bend  Marching   Hip Abduction  Hip External Rotation  Hip Extension    Advance exercises with equipment for strengthening, flexibility, balance needs as appropriate per setting. Avoid active hip flexion exercised for 4 weeks. Repetitions and number of sets to be established per patient tolerance     Reasons to call the Surgeon  1. Increased redness, swelling or drainage from the incision site  2. Temperature consistently greater than 101 degrees  3. Increased pain or unrelieved pain in hip or calf       Discharge SNF/Rehab Instructions/LTAC       PATIENT ID: Annette Gao  MRN: [id number]   YOB: 1940    DATE OF ADMISSION: 12/22/22  DATE OF DISCHARGE: 12/27/2022    PRIMARY CARE PROVIDER: None    ATTENDING PHYSICIAN: Dr. Julita Lucero  DISCHARGING PROVIDER: Bhupendra Comer PA-C     To contact this individual call [phone] and ask the  to page. If unavailable ask to be transferred the Adult Hospitalist Department. CONSULTATIONS: Orthopedic Surgery    PROCEDURES/SURGERIES: Procedure(s):  RIGHT BIPOLAR HIP ANTERIOR APPROACH    ADMITTING DIAGNOSES & HOSPITAL COURSE:   -- Displaced R femoral neck fracture    Phyliss Tia Schirmer is a 81 y.o. female with PMHx of HTN, MI with CAD, and MDD who presented to the ED after a fall on 12/22/22 with imaging revealing displaced right femoral neck fracture. She underwent R bipolar hemiarthroplasty on 12/22/22 with Orthopedic Surgeon, Dr. Grzegorz Hutchins. She was ultimately cleared for discharge to [facility] on 12/27/22. She does not have a PCP and discussed with CCRN and a member of the [facility] team will reach out to her to assist with arranging a follow-up with a PCP near her and got confirmation from the Case Management specialist will help arrange. We discussed her medications at discharge and she is not sure of the medications she takes. I reviewed with the pharmacist her recent fill of medications to help update her medication list. Prior to discharge reviewed all questions/concerns/and follow up plans with her and plan for discharge for which she is in agreement.      Prior to discharge I reviewed care plans with attending MD, Dr. Melissa Holland, who was in agreement. DISCHARGE DIAGNOSES / PLAN:      GLF resulting in displaced R femoral neck fracture  -- Appreciate Orthopedic Surgery team involvement. Signed off on 12/24/22  -- s/p Right bipolar hemiarthroplasty on 12/23 with Dr. Sridhar Lynch  -- Pain: Continued Tylenol 1g q8h and breakthrough pain with Oxycodone 10mg q4h PRN. On discharge gave a paper script for Oxycodone 10mg q6h PRN for pain with #28# tablets and 0 refills  -- DVT PPx: ASA 81mg BID and SCDs. At follow-up with orthopedic surgery encouraged to discuss decreasing back to her home daily dose  -- Dressing: Leave in place if it remains dry and intact; change as needed for saturation with dry sterile island dressing  -- Activity: WBAT  -- Appreciate PT/OT involvement  -- Follow-up: Needs to follow-up in 2 weeks with Dr. Amarilis Weston     HTN  Hx of MI, CAD, CHF  -- Reviewed recent fill BP medications with the pharmacist, but since does not have a PCP has not regularly been getting her medications  -- During hospitalization was using Hyzaar 1 tablet daily based on hospital formulary. On discharge will resume combination pill of Olmesartan-HCTX 40-12.5mg daily. -- From recent hospitalization due to hx of MI with CHF, will restart Carvedilol 6.25mg BID. Her blood pressure has been mildly high and Carvedilol can help improve  -- On ASA 81mg daily at home and now BID dosing per ortho surgery for DVT PPx. Encouraged to follow-up with them and discuss at follow-up to decrease back to daily dosing  -- Encouraged follow-up with her cardiologist     TUAN  -- Continue home Buspar 10mg TID and Cymbalta 60mg daily     Dyslipidemia  -- Continue home Lipitor 40mg daily     NIDDM II:  -- Not on sliding scale insulin during admission.  Glucose moderately well controlled  -- Hgb A1c of 6.9% on 10/14/22  -- Home regimen included Metformin 500mg BID and recommended to restart     Insomnia  -- Continue home Trazodone 200mg nightly     Code status: Full  Prophylaxis: SCDs/BID ASA  Care Plan discussed with: patient, Care coordinator  Anticipated Disposition: [facility] on 12/27/22       PENDING TEST RESULTS:   At the time of discharge the following test results are still pending: None    FOLLOW UP APPOINTMENTS:    -- Please schedule a follow-up with orthopedic surgeon, Dr. Gwyn Francois, in 2 weeks  -- The case management team at [facility] will call you to help arrange a PCP follow-up  -- Please schedule a follow-up with your cardiologist    ADDITIONAL CARE RECOMMENDATIONS:   -- Please take all medication as prescribed  -- Do not increase the dose, frequency of the Oxycodone. Do not drive or operate machinery or drink alcohol on this medication    DIET: Regular Diet, diabetic    TUBE FEEDING INSTRUCTIONS: N/A    OXYGEN / BiPAP SETTINGS: n/a    ACTIVITY: Activity as tolerated, WBAT    WOUND CARE: Leave in place if it remains dry and intact; change as needed for saturation with dry sterile island dressing    EQUIPMENT needed: N/A      DISCHARGE MEDICATIONS:   See Medication Reconciliation Form      NOTIFY YOUR PHYSICIAN FOR ANY OF THE FOLLOWING:   Fever over 101 degrees for 24 hours. Chest pain, shortness of breath, fever, chills, nausea, vomiting, diarrhea, change in mentation, falling, weakness, bleeding. Severe pain or pain not relieved by medications. Or, any other signs or symptoms that you may have questions about.     DISPOSITION:    Home With:   OT  PT  HH  RN       SNF/Inpatient Rehab/LTAC ([facility])    Independent/assisted living    Hospice    Other:       PATIENT CONDITION AT DISCHARGE: stable and improved    Functional status    Poor     Deconditioned     Independent      Cognition     Lucid     Forgetful     Dementia      Catheters/lines (plus indication)    Ortega     PICC     PEG     None      Code status     Full code     DNR      PHYSICAL EXAMINATION AT DISCHARGE:   Refer to Progress Note      [facility]  Checked:   Yes      PROBLEM LIST Updated:  Yes          Signed:   Wali PINK BLAKE Cagle  12/27/2022  12:44 PM

## 2022-12-24 NOTE — PROGRESS NOTES
Physical Therapy  12/24/2022    Chart reviewed. Attempted to see pt this afternoon for PT, however pt currently working w/OT. Will check back later this afternoon as able/appropriate.        Marni Faust

## 2022-12-24 NOTE — PROGRESS NOTES
Problem: Self Care Deficits Care Plan (Adult)  Goal: *Acute Goals and Plan of Care (Insert Text)  Description: FUNCTIONAL STATUS PRIOR TO ADMISSION: Patient was independent and active without use of DME.    HOME SUPPORT: The patient lived alone with no assistance. Occupational Therapy Goals  Initiated 12/23/2022  1. Patient will perform lower body ADLs with AE supervision/set-up within 4 day(s). 2.  Patient will perform upper body ADLs standing 5 mins without fatigue or LOB with supervision/set-up within 4 day(s). 3.  Patient will perform toilet transfer with supervision/set-up within 4 day(s). 4.  Patient will perform all aspects of toileting with supervision/set-up within 4 day(s). 5.  Patient will participate in upper extremity therapeutic exercise/activities with supervision/set-up for 10 minutes within 4 day(s). 6.  Patient will utilize energy conservation techniques during functional activities without cues within 4 day(s). Outcome: Progressing Towards Goal     OCCUPATIONAL THERAPY TREATMENT  Patient: Ana Patton (81 y.o. female)  Date: 12/24/2022  Diagnosis: Hip fracture ([facility] [address] [S72.009A] <principal problem not specified>  Procedure(s) (LRB):  RIGHT BIPOLAR HIP ANTERIOR APPROACH (Right) 2 Days Post-Op  Precautions: WBAT  Chart, occupational therapy assessment, plan of care, and goals were reviewed. ASSESSMENT  Patient continues with skilled OT services and is progressing towards goals. She is limited due to generalized weakness, pain, functional mobility, and balance. Patient ambulated with RW and close CGA bed>chair. Good control stand>sit and tolerated activity well. Patient instructed on AE for ADLs with strong understanding via teach back and verbalization. Patient doffed/donned socks with min A using sock aid and reacher. OT will continue to follow in acute setting.      Current Level of Function Impacting Discharge (ADLs): min A    Other factors to consider for discharge: social support, DME, PLOF         PLAN :  Patient continues to benefit from skilled intervention to address the above impairments. Continue treatment per established plan of care to address goals. Recommend with staff: up to chair 3/day for meals     Recommend next OT session: standing, grooming at sink, practice with AE    Recommendation for discharge: (in order for the patient to meet his/her long term goals)  Occupational therapy at least 2 days/week in the home     This discharge recommendation:  Has been made in collaboration with the attending provider and/or case management    IF patient discharges home will need the following DME: shower chair and walker: rolling       SUBJECTIVE:   Patient stated I hope Majo Ashby finds me in here.     OBJECTIVE DATA SUMMARY:   Cognitive/Behavioral Status:  Neurologic State: Alert  Orientation Level: Oriented X4  Cognition: Appropriate decision making  Perception: Appears intact  Perseveration: No perseveration noted  Safety/Judgement: Awareness of environment    Functional Mobility and Transfers for ADLs:  Bed Mobility:  Supine to Sit: Contact guard assistance  Sit to Supine:  (did not return to supine)  Scooting: Contact guard assistance    Transfers:  Sit to Stand: Contact guard assistance; Adaptive equipment  Bed to Chair: Contact guard assistance; Adaptive equipment; Additional time    Balance:  Sitting: Intact; Without support  Sitting - Static: Good (unsupported)  Sitting - Dynamic: Fair (occasional)  Standing: Impaired; With support  Standing - Static: Good;Constant support  Standing - Dynamic : Fair;Constant support    ADL Intervention:  Upper Body [address]: Minimum  assistance    Lower Body Dressing Assistance  Socks: Minimum assistance  Leg Crossed Method Used: No  Position Performed: Seated in chair  Adaptive Equipment Used: Reacher;Sock aid    Cognitive Retraining  Safety/Judgement: Awareness of environment    Precautions: Patient recalled and demonstrated avoiding extreme planes of movement with Right LE during ADLs and functional mobility with minimal cues. Bathing: Patient instructed when bathing to not submerge wound in water, stand to shower or sponge bathe, cover wound with plastic and tape to ensure no water reaches bandage/wound without cues. Patient indicated understanding. Dressing joint: Patient instructed and demonstrated understanding to don/doff Right LE first/last with minimal cues. Patient instructed and demonstrated to don all clothing while sitting prior to standing, doff all clothing to knees while standing, then sit to doff clothing off from knees to feet to facilitate fall prevention, pain management, and energy conservation with Minimum assistance. Dressing joint reach exercise: To increase independence with lower body dressing, patient instructed and demonstrated to reach down Right LE in a seated position slowly to prevent tearing/shearing until slight pull is felt, hold at end range for 10 seconds, then return to starting upright position with Minimum assistance. Patient instructed to complete three sets of three repetitions each daily. Home safety: Patient instructed on home modifications and safety (raise height of ADL objects, appropriate height of chair surfaces, recliner safety, change of floor surfaces, clear pathways) to increase independence and fall prevention. Patient indicated understanding. Standing: Patient instructed and demonstrated to walk up to sink/countertop/surfaces, step into walker to increase safety of joint and fall prevention with Minimum assistance. Instructed to apply concept of hip contraindications to ADLs within the home (no extreme reaching across body to Right side, square off while using objects, slide objects along surfaces).   Patient instructed to increase amount of time standing, observe standing position during ADLs to increase even weight bearing through bilateral LEs to increase independence with ADLs. Goal to be reached 30 days post - op, per orthopedic surgeon or per PT. Patient indicated understanding. Tub transfer: Patient instructed regarding when it is safe to begin transfer into tub (complete stairs with PT, advance exercises with PT high enough to clear tub height). Patient instructed to use the same technique as used with stairs when entering and exiting tub (\"up with the non-surgical, down with the surgical leg\"). Patient indicated understanding. Pain:  0/10 overall     Activity Tolerance:   Good    After treatment patient left in no apparent distress:   Sitting in chair, Call bell within reach, and Bed / chair alarm activated    COMMUNICATION/COLLABORATION:   The patients plan of care was discussed with: Physical therapy assistant, Registered nurse, and Physician.      Phyllistine Sample, OT  Time Calculation: 40 mins

## 2022-12-24 NOTE — PROGRESS NOTES
Bedside shift change report given to Nelly Kearney RN (oncoming nurse) by Leigh Barba RN (offgoing nurse). Report included the following information SBAR, Kardex, Intake/Output, and MAR.

## 2022-12-24 NOTE — PROGRESS NOTES
Orthopedic Progress Note    S: Pain well controlled, no new complaints; Denies numbness, tingling, focal weakness, cp, sob, fever, chills    O: NAD, respirations unlabored; Dressings CDI; thigh soft, no edema, no posterior calf ttp; EHL/DF/PF 5/5, SILT; Cap refill brisk, foot warm, DP2+    Patient Vitals for the past 4 hrs:   Pulse   12/24/22 0549 77     Recent Labs     12/24/22  0535 12/23/22  0517 12/21/22  1948   HGB 10.0* 10.7* 12.5   HCT  --  33.3* 38.5   PLT  --  270 390   BUN 17 14 13   CREA 1.08* 1.12* 1.07*   K 4.2 4.1 3.0*    138 141       XR PELV AP ONLY  Narrative: INDICATION:  Right hip replacement, history of right femoral neck fracture . Portable AP view of the pelvis demonstrates right hip replacement with normal  alignment. Postoperative changes are evident in the soft tissues. Impression: Right hip replacement with normal alignment. XR HIP RT DURING OR PROC  Narrative: Compliance only    INDICATION:  Hip replacement. Portable intraoperative AP view of the right hip demonstrates right hip  replacement in progress. Impression: Right hip replacement in progress. A/P:  Procedure: Procedure(s):  RIGHT BIPOLAR HIP ANTERIOR APPROACH  Post Op day: 2 Days Post-Op    - DVT ppx - ASA 81mg BID; SCDs  - PT/OT - WBAT  - Pain - APAP, Oxy sparingly; Ice, Elevation, Ace wrap as needed  - Dressing - Leave in place if it remains dry and intact; change as needed for saturation with dry sterile island dressing  - Dispo - Pending PT/OT recs; needs f/u in 2 weeks with Dr. Mikayla Antonio; refer to CO instructions for further details.     Will sign off, call with questions    RADHA Olson  Orthopedic Trauma Service  [address] [place] [address]

## 2022-12-24 NOTE — PROGRESS NOTES
Pt tearful,distraught , sharing , that this is her first Magdalena without her , as he recentily . Pt also in pain, just overwhelmed.  Will recheck after pt has calmed down

## 2022-12-24 NOTE — PROGRESS NOTES
[facility]                                                                                      Hospitalist Progress Note  Cortez Nazario NP     Date of Service:  2022  NAME:  Juan Chanel  :  1940  MRN:  [id number]    Admission Summary:   Ms. Rayo Zhu is a 81 y.o. female with hx of htn, hx of mi, cad, mdd w/ tuan, niddm ii, who presented to hospital with hip pain after a fall. She had been at the store buying a lottery ticket when she twisted her ankle and fell to her back. She noted immediate right hip pain and was brought to the ED for evaluation. Interval history / Subjective:        Pt was seen this afternoon, OT at bedside preparing to get her up to chair. Has some mild pain to RLE but overall controlled. Pt was in good spirits and joking around.      Assessment & Plan:     Right Femoral Neck Fracture:  - XR right hip: right femoral neck fx.  - s/p Right bipolar hemiarthroplasty on  w/ Dr. eSb Stanton  - pain mgmt: scheduled tylenol with prn oxy or tramadol  - DVT ppx with 81 mg ASA BID  - PT/OT consult for mobility  - Ortho following     HTN:  - blood pressure elevated on presentation to the ED  - resume home meds- BP overall better controlled     TUAN:  - c/w home buspar  - stable mood - tearful when reflecting on losing  this year     Dyslipidemia:  - c/w home lipitor     NIDDM II:  - SSI +Hypoglycemic protocols  - glucose 133-182     Obesity:  - Counseled on weight loss, dieting and exercise     Code status: Full  Prophylaxis: SCDs/BID ASA  Care Plan discussed with: patient, OT  Anticipated Disposition: > 48 hours     Hospital Problems  Date Reviewed: 2011            Codes Class Noted POA    [facility] ICD-10-CM: C92.721M  ICD-9-CM: 820.8  2022 Unknown         Review of Systems:     Denies HA, dizziness  No chest pain, pressure  No SOB, cough  No GI complaints such as N/V/D/C  Mild R leg discomfort    Vital Signs: Last 24hrs VS reviewed since prior progress note. Most recent are:  Visit Vitals  /72 (BP 1 Location: Right upper arm, BP Patient Position: Lying)   Pulse 74   Temp 99.3 °F (37.4 °C)   Resp 16   Wt 76.6 kg (168 lb 12.8 oz)   SpO2 90%   BMI 32.97 kg/m²       Intake/Output Summary (Last 24 hours) at 12/24/2022 1132  Last data filed at 12/24/2022 0016  Gross per 24 hour   Intake 210 ml   Output 500 ml   Net -290 ml        Physical Examination:     I had a face to face encounter with this patient and independently examined them on 12/24/2022 as outlined below:        Constitutional:  Elderly female lying in bed in no acute distress, cooperative, pleasant    ENT:  MMM    Resp:  CTA bilaterally. No wheezing/rhonchi/rales. No accessory muscle use. Sas 91% on RA   CV:  Regular rhythm, normal rate, no murmurs, gallops, rubs    GI:  Soft/obese, non distended, non tender. Normoactive bowel sounds No BM    Musculoskeletal:  Mild R thigh edema. Right hip dressing c/d/I. Neurologic:  Moves all extremities. AAOx3       Data Review:   Review and/or order of clinical lab test  Review and/or order of tests in the medicine section of CPT    Labs:     Recent Labs     12/24/22  0535 12/23/22  0517 12/21/22 1948   WBC  --  8.8 14.3*   HGB 10.0* 10.7* 12.5   HCT  --  33.3* 38.5   PLT  --  270 390       Recent Labs     12/24/22  0535 12/23/22  0517 12/21/22 1948    138 141   K 4.2 4.1 3.0*    108 102   CO2 27 28 25   BUN 17 14 13   CREA 1.08* 1.12* 1.07*   * 134* 154*   CA 8.0* 7.7* 9.3       Recent Labs     12/21/22 1948   ALT 25      TBILI 0.5   TP 7.3   ALB 3.8   GLOB 3.5       Recent Labs     12/21/22 1948   INR 1.0   PTP 9.6        No results for input(s): FE, TIBC, PSAT, FERR in the last 72 hours. Lab Results   Component Value Date/Time    Folate >24.0 (H) 08/03/2009 03:59 PM      No results for input(s): PH, PCO2, PO2 in the last 72 hours.   No results for input(s): CPK, CKNDX, TROIQ in the last 72 hours.     No lab exists for component: CPKMB  Lab Results   Component Value Date/Time    Cholesterol, total 142 10/14/2022 10:06 AM    HDL Cholesterol 74 10/14/2022 10:06 AM    LDL, calculated 55.4 10/14/2022 10:06 AM    Triglyceride 63 10/14/2022 10:06 AM    CHOL/HDL Ratio 1.9 10/14/2022 10:06 AM     Lab Results   Component Value Date/Time    Glucose (POC) 133 (H) 12/24/2022 11:14 AM    Glucose (POC) 146 (H) 12/24/2022 06:22 AM    Glucose (POC) 182 (H) 12/23/2022 09:32 PM    Glucose (POC) 159 (H) 12/23/2022 04:28 PM    Glucose (POC) 144 (H) 12/23/2022 06:54 AM     Lab Results   Component Value Date/Time    Color YELLOW/STRAW 10/16/2022 02:23 PM    Appearance CLEAR 10/16/2022 02:23 PM    Specific gravity 1.014 10/16/2022 02:23 PM    pH (UA) 5.0 10/16/2022 02:23 PM    Protein Negative 10/16/2022 02:23 PM    Glucose Negative 10/16/2022 02:23 PM    Ketone TRACE (A) 10/16/2022 02:23 PM    Bilirubin Negative 10/16/2022 02:23 PM    Urobilinogen 0.2 10/16/2022 02:23 PM    Nitrites Negative 10/16/2022 02:23 PM    Leukocyte Esterase SMALL (A) 10/16/2022 02:23 PM    Epithelial cells FEW 10/16/2022 02:23 PM    Bacteria Negative 10/16/2022 02:23 PM    WBC 5-10 10/16/2022 02:23 PM    RBC 0-5 10/16/2022 02:23 PM     Medications Reviewed:     Current Facility-Administered Medications   Medication Dose Route Frequency    atorvastatin (LIPITOR) tablet 40 mg  40 mg Oral DAILY    busPIRone (BUSPAR) tablet 10 mg  10 mg Oral TID    traZODone (DESYREL) tablet 50 mg  50 mg Oral QHS    DULoxetine (CYMBALTA) capsule 60 mg  60 mg Oral DAILY    losartan/hydroCHLOROthiazide (HYZAAR) 50/12.5 mg   Oral DAILY    hydrALAZINE (APRESOLINE) 20 mg/mL injection 20 mg  20 mg IntraVENous Q6H PRN    oxyCODONE IR (ROXICODONE) tablet 10 mg  10 mg Oral Q4H PRN    acetaminophen (TYLENOL) tablet 1,000 mg  1,000 mg Oral Q8H    sodium chloride (NS) flush 5-40 mL  5-40 mL IntraVENous Q8H    sodium chloride (NS) flush 5-40 mL  5-40 mL IntraVENous PRN ondansetron (ZOFRAN ODT) tablet 4 mg  4 mg Oral Q8H PRN    Or    ondansetron (ZOFRAN) injection 4 mg  4 mg IntraVENous Q6H PRN    0.9% sodium chloride infusion  75 mL/hr IntraVENous CONTINUOUS    polyethylene glycol (MIRALAX) packet 17 g  17 g Oral DAILY    senna-docusate (PERICOLACE) 8.6-50 mg per tablet 1 Tablet  1 Tablet Oral BID    naloxone (NARCAN) injection 0.4 mg  0.4 mg IntraVENous EVERY 2 MINUTES AS NEEDED    sodium chloride (NS) flush 5-40 mL  5-40 mL IntraVENous Q8H    sodium chloride (NS) flush 5-40 mL  5-40 mL IntraVENous PRN    traMADoL (ULTRAM) tablet 50 mg  50 mg Oral Q6H PRN    hydrOXYzine HCL (ATARAX) tablet 10 mg  10 mg Oral Q8H PRN    naloxone (NARCAN) injection 0.4 mg  0.4 mg IntraVENous PRN    calcium-vitamin D (OS-ONESIMO +D3) 500 mg-200 unit per tablet 1 Tablet  1 Tablet Oral TID WITH MEALS    polyethylene glycol (MIRALAX) packet 17 g  17 g Oral DAILY    bisacodyL (DULCOLAX) suppository 10 mg  10 mg Rectal DAILY PRN    aspirin delayed-release tablet 81 mg  81 mg Oral BID   ______________________________________________________________________  EXPECTED LENGTH OF STAY: 4d 0h  ACTUAL LENGTH OF STAY:          3               Nirmal Laurent NP

## 2022-12-25 LAB
GLUCOSE BLD STRIP.AUTO-MCNC: 106 MG/DL (ref 65–117)
GLUCOSE BLD STRIP.AUTO-MCNC: 125 MG/DL (ref 65–117)
GLUCOSE BLD STRIP.AUTO-MCNC: 126 MG/DL (ref 65–117)
GLUCOSE BLD STRIP.AUTO-MCNC: 99 MG/DL (ref 65–117)
SERVICE CMNT-IMP: ABNORMAL
SERVICE CMNT-IMP: ABNORMAL
SERVICE CMNT-IMP: NORMAL
SERVICE CMNT-IMP: NORMAL

## 2022-12-25 PROCEDURE — 74011250637 HC RX REV CODE- 250/637: Performed by: ORTHOPAEDIC SURGERY

## 2022-12-25 PROCEDURE — 94760 N-INVAS EAR/PLS OXIMETRY 1: CPT

## 2022-12-25 PROCEDURE — 82962 GLUCOSE BLOOD TEST: CPT

## 2022-12-25 PROCEDURE — 74011000250 HC RX REV CODE- 250: Performed by: ORTHOPAEDIC SURGERY

## 2022-12-25 PROCEDURE — 65270000046 HC RM TELEMETRY

## 2022-12-25 RX ADMIN — OYSTER SHELL CALCIUM WITH VITAMIN D 1 TABLET: 500; 200 TABLET, FILM COATED ORAL at 09:53

## 2022-12-25 RX ADMIN — SODIUM CHLORIDE, PRESERVATIVE FREE 10 ML: 5 INJECTION INTRAVENOUS at 14:32

## 2022-12-25 RX ADMIN — SODIUM CHLORIDE, PRESERVATIVE FREE 10 ML: 5 INJECTION INTRAVENOUS at 06:13

## 2022-12-25 RX ADMIN — BUSPIRONE HYDROCHLORIDE 10 MG: 10 TABLET ORAL at 09:30

## 2022-12-25 RX ADMIN — BUSPIRONE HYDROCHLORIDE 10 MG: 10 TABLET ORAL at 22:00

## 2022-12-25 RX ADMIN — HYDROCHLOROTHIAZIDE: 25 TABLET ORAL at 09:39

## 2022-12-25 RX ADMIN — POLYETHYLENE GLYCOL 3350 17 G: 17 POWDER, FOR SOLUTION ORAL at 09:30

## 2022-12-25 RX ADMIN — OYSTER SHELL CALCIUM WITH VITAMIN D 1 TABLET: 500; 200 TABLET, FILM COATED ORAL at 14:32

## 2022-12-25 RX ADMIN — BUSPIRONE HYDROCHLORIDE 10 MG: 10 TABLET ORAL at 17:56

## 2022-12-25 RX ADMIN — ATORVASTATIN CALCIUM 40 MG: 40 TABLET, FILM COATED ORAL at 09:29

## 2022-12-25 RX ADMIN — DULOXETINE HYDROCHLORIDE 60 MG: 30 CAPSULE, DELAYED RELEASE ORAL at 09:29

## 2022-12-25 RX ADMIN — ASPIRIN 81 MG: 81 TABLET, COATED ORAL at 09:31

## 2022-12-25 RX ADMIN — ACETAMINOPHEN 1000 MG: 500 TABLET, FILM COATED ORAL at 06:13

## 2022-12-25 RX ADMIN — SENNOSIDES AND DOCUSATE SODIUM 1 TABLET: 50; 8.6 TABLET ORAL at 09:30

## 2022-12-25 RX ADMIN — ACETAMINOPHEN 1000 MG: 500 TABLET, FILM COATED ORAL at 14:32

## 2022-12-25 RX ADMIN — OYSTER SHELL CALCIUM WITH VITAMIN D 1 TABLET: 500; 200 TABLET, FILM COATED ORAL at 17:55

## 2022-12-25 NOTE — PROGRESS NOTES
[address] [facility]  Hospitalist Group                                                                                      Hospitalist Progress Note  Stephanie Martini NP     Date of Service:  2022  NAME:  Galina Chavez  :  1940  MRN:  [id number]    Admission Summary:   Ms. Diaz is a 81 y.o. female with hx of Htn hx of MI, CAD, MDD w/ TUAN, DM who presented to hospital with hip pain after a fall. She had been at the store buying a lottery ticket when she twisted her ankle and fell to her back. She noted immediate right hip pain and was brought to the ED for evaluation. Interval history / Subjective:        Pt was lying in bed, watching TV - no acute distress. Not as talkative as yesterday. Denies any new discomforts. Assessment & Plan:     Right Femoral Neck Fracture:  - XR right hip: right femoral neck fx.  - s/p Right bipolar hemiarthroplasty on  w/ Dr. Jaleesa Polo  - pain mgmt: scheduled tylenol with prn oxy or tramadol  - DVT ppx with 81 mg ASA BID  - PT/OT consult for mobility  - Ortho following  - case mgmt for discharge planning     HTN:  - blood pressure elevated on presentation to the ED  - resumed home meds  - BP controlled     TUAN:  - c/w home buspar  - stable mood      Dyslipidemia:  - c/w home lipitor     NIDDM II:  - SSI +Hypoglycemic protocols  - glucose fairly well controlled 126-150     Obesity:  - Counseled on weight loss, dieting and exercise     Code status: Full  Prophylaxis: SCDs/BID ASA  Care Plan discussed with: patient  Anticipated Disposition: to be determined, wants to go home with personal care.  consulted.       Hospital Problems  Date Reviewed: 2011            Codes Class Noted POA    Hip fracture [facility]) ICD-10-CM: Z23.094V  ICD-9-CM: 820.8  2022 Unknown         Review of Systems:     Denies HA, dizziness  No chest pain, pressure  No SOB, cough  No GI complaints such as N/V/D/C, fair appetite  Mild R leg discomfort but controlled    Vital Signs:    Last 24hrs VS reviewed since prior progress note. Most recent are:  Visit Vitals  /68 (BP 1 Location: Left upper arm, BP Patient Position: At rest;Semi fowlers)   Pulse 68   Temp 98.1 °F (36.7 °C)   Resp 18   Wt 76.6 kg (168 lb 12.8 oz)   SpO2 92%   BMI 32.97 kg/m²       Intake/Output Summary (Last 24 hours) at 12/25/2022 2643  Last data filed at 12/24/2022 2030  Gross per 24 hour   Intake 210 ml   Output --   Net 210 ml        Physical Examination:     I had a face to face encounter with this patient and independently examined them on 12/25/2022 as outlined below:        Constitutional: Elderly female lying in bed in no acute distress, cooperative, pleasant    ENT:  MMM    Resp:  CTA bilaterally. No wheezing/rhonchi/rales. No accessory muscle use. Sats 92% on RA   CV:  Regular rhythm, normal rate, no murmurs. HR 75    GI:  Soft/obese, non distended, non tender. Normoactive bowel sounds No BM    Musculoskeletal:  Mild R thigh edema. Right hip dressing c/d/I. Neurologic:  Moves all extremities. AAOx3       Data Review:   Review and/or order of clinical lab test  Review and/or order of tests in the medicine section of CPT    Labs:     Recent Labs     12/24/22  0535 12/23/22  0517   WBC  --  8.8   HGB 10.0* 10.7*   HCT  --  33.3*   PLT  --  270       Recent Labs     12/24/22  0535 12/23/22  0517    138   K 4.2 4.1    108   CO2 27 28   BUN 17 14   CREA 1.08* 1.12*   * 134*   CA 8.0* 7.7*       No results for input(s): ALT, AP, TBIL, TBILI, TP, ALB, GLOB, GGT, AML, LPSE in the last 72 hours. No lab exists for component: SGOT, GPT, AMYP, HLPSE    No results for input(s): INR, PTP, APTT, INREXT, INREXT in the last 72 hours. No results for input(s): FE, TIBC, PSAT, FERR in the last 72 hours. Lab Results   Component Value Date/Time    Folate >24.0 (H) 08/03/2009 03:59 PM        No results for input(s): PH, PCO2, PO2 in the last 72 hours.   No results for input(s): CPK, CKNDX, TROIQ in the last 72 hours.     No lab exists for component: CPKMB  Lab Results   Component Value Date/Time    Cholesterol, total 142 10/14/2022 10:06 AM    HDL Cholesterol 74 10/14/2022 10:06 AM    LDL, calculated 55.4 10/14/2022 10:06 AM    Triglyceride 63 10/14/2022 10:06 AM    CHOL/HDL Ratio 1.9 10/14/2022 10:06 AM     Lab Results   Component Value Date/Time    Glucose (POC) 125 (H) 12/25/2022 07:00 AM    Glucose (POC) 150 (H) 12/24/2022 09:32 PM    Glucose (POC) 146 (H) 12/24/2022 06:09 PM    Glucose (POC) 133 (H) 12/24/2022 11:14 AM    Glucose (POC) 146 (H) 12/24/2022 06:22 AM     Lab Results   Component Value Date/Time    Color YELLOW/STRAW 10/16/2022 02:23 PM    Appearance CLEAR 10/16/2022 02:23 PM    Specific gravity 1.014 10/16/2022 02:23 PM    pH (UA) 5.0 10/16/2022 02:23 PM    Protein Negative 10/16/2022 02:23 PM    Glucose Negative 10/16/2022 02:23 PM    Ketone TRACE (A) 10/16/2022 02:23 PM    Bilirubin Negative 10/16/2022 02:23 PM    Urobilinogen 0.2 10/16/2022 02:23 PM    Nitrites Negative 10/16/2022 02:23 PM    Leukocyte Esterase SMALL (A) 10/16/2022 02:23 PM    Epithelial cells FEW 10/16/2022 02:23 PM    Bacteria Negative 10/16/2022 02:23 PM    WBC 5-10 10/16/2022 02:23 PM    RBC 0-5 10/16/2022 02:23 PM     Medications Reviewed:     Current Facility-Administered Medications   Medication Dose Route Frequency    atorvastatin (LIPITOR) tablet 40 mg  40 mg Oral DAILY    busPIRone (BUSPAR) tablet 10 mg  10 mg Oral TID    traZODone (DESYREL) tablet 50 mg  50 mg Oral QHS    DULoxetine (CYMBALTA) capsule 60 mg  60 mg Oral DAILY    losartan/hydroCHLOROthiazide (HYZAAR) 50/12.5 mg   Oral DAILY    hydrALAZINE (APRESOLINE) 20 mg/mL injection 20 mg  20 mg IntraVENous Q6H PRN    oxyCODONE IR (ROXICODONE) tablet 10 mg  10 mg Oral Q4H PRN    acetaminophen (TYLENOL) tablet 1,000 mg  1,000 mg Oral Q8H    sodium chloride (NS) flush 5-40 mL  5-40 mL IntraVENous Q8H    sodium chloride (NS) flush 5-40 mL  5-40 mL IntraVENous PRN    ondansetron (ZOFRAN ODT) tablet 4 mg  4 mg Oral Q8H PRN    Or    ondansetron (ZOFRAN) injection 4 mg  4 mg IntraVENous Q6H PRN    0.9% sodium chloride infusion  75 mL/hr IntraVENous CONTINUOUS    polyethylene glycol (MIRALAX) packet 17 g  17 g Oral DAILY    senna-docusate (PERICOLACE) 8.6-50 mg per tablet 1 Tablet  1 Tablet Oral BID    naloxone (NARCAN) injection 0.4 mg  0.4 mg IntraVENous EVERY 2 MINUTES AS NEEDED    sodium chloride (NS) flush 5-40 mL  5-40 mL IntraVENous PRN    traMADoL (ULTRAM) tablet 50 mg  50 mg Oral Q6H PRN    hydrOXYzine HCL (ATARAX) tablet 10 mg  10 mg Oral Q8H PRN    naloxone (NARCAN) injection 0.4 mg  0.4 mg IntraVENous PRN    calcium-vitamin D (OS-ONESIMO +D3) 500 mg-200 unit per tablet 1 Tablet  1 Tablet Oral TID WITH MEALS    bisacodyL (DULCOLAX) suppository 10 mg  10 mg Rectal DAILY PRN    aspirin delayed-release tablet 81 mg  81 mg Oral BID   ______________________________________________________________________  EXPECTED LENGTH OF STAY: 4d 0h  ACTUAL LENGTH OF STAY:          [address] [facility] NP

## 2022-12-25 NOTE — PROGRESS NOTES
Problem: Falls - Risk of  Goal: *Absence of Falls  Description: Document Too Blandon Fall Risk and appropriate interventions in the flowsheet. Outcome: Progressing Towards Goal  Note: Fall Risk Interventions:  Mobility Interventions: Communicate number of staff needed for ambulation/transfer, Patient to call before getting OOB, PT Consult for mobility concerns, Utilize walker, cane, or other assistive device, Utilize gait belt for transfers/ambulation, PT Consult for assist device competence, Strengthening exercises (ROM-active/passive)    Mentation Interventions: Door open when patient unattended, Toileting rounds    Medication Interventions: Patient to call before getting OOB, Teach patient to arise slowly, Bed/chair exit alarm    Elimination Interventions: Call light in reach, Elevated toilet seat, Patient to call for help with toileting needs, Bed/chair exit alarm, Toilet paper/wipes in reach    History of Falls Interventions:  Investigate reason for fall, Room close to nurse's station, Utilize gait belt for transfer/ambulation, Door open when patient unattended, Bed/chair exit alarm         Problem: Pain  Goal: *Control of Pain  Outcome: Progressing Towards Goal  Goal: *PALLIATIVE CARE:  Alleviation of Pain  Outcome: Progressing Towards Goal     Problem: Patient Education: Go to Patient Education Activity  Goal: Patient/Family Education  Outcome: Progressing Towards Goal

## 2022-12-25 NOTE — PROGRESS NOTES
Bedside shift change report given to Asher Bhat (oncoming nurse) by Filippo Geiger RN (offgoing nurse). Report included the following information SBAR, Kardex, Intake/Output, and MAR.

## 2022-12-26 LAB
GLUCOSE BLD STRIP.AUTO-MCNC: 110 MG/DL (ref 65–117)
GLUCOSE BLD STRIP.AUTO-MCNC: 114 MG/DL (ref 65–117)
GLUCOSE BLD STRIP.AUTO-MCNC: 146 MG/DL (ref 65–117)
SERVICE CMNT-IMP: ABNORMAL
SERVICE CMNT-IMP: NORMAL
SERVICE CMNT-IMP: NORMAL

## 2022-12-26 PROCEDURE — 97535 SELF CARE MNGMENT TRAINING: CPT

## 2022-12-26 PROCEDURE — 74011250637 HC RX REV CODE- 250/637: Performed by: ORTHOPAEDIC SURGERY

## 2022-12-26 PROCEDURE — 65270000029 HC RM PRIVATE

## 2022-12-26 PROCEDURE — 97116 GAIT TRAINING THERAPY: CPT

## 2022-12-26 PROCEDURE — 74011000250 HC RX REV CODE- 250: Performed by: ORTHOPAEDIC SURGERY

## 2022-12-26 PROCEDURE — 82962 GLUCOSE BLOOD TEST: CPT

## 2022-12-26 PROCEDURE — 97530 THERAPEUTIC ACTIVITIES: CPT

## 2022-12-26 RX ADMIN — SENNOSIDES AND DOCUSATE SODIUM 1 TABLET: 50; 8.6 TABLET ORAL at 02:05

## 2022-12-26 RX ADMIN — HYDROCHLOROTHIAZIDE: 25 TABLET ORAL at 11:37

## 2022-12-26 RX ADMIN — SODIUM CHLORIDE, PRESERVATIVE FREE 10 ML: 5 INJECTION INTRAVENOUS at 22:37

## 2022-12-26 RX ADMIN — ATORVASTATIN CALCIUM 40 MG: 40 TABLET, FILM COATED ORAL at 11:37

## 2022-12-26 RX ADMIN — ACETAMINOPHEN 1000 MG: 500 TABLET, FILM COATED ORAL at 02:05

## 2022-12-26 RX ADMIN — TRAZODONE HYDROCHLORIDE 50 MG: 100 TABLET ORAL at 02:05

## 2022-12-26 RX ADMIN — OYSTER SHELL CALCIUM WITH VITAMIN D 1 TABLET: 500; 200 TABLET, FILM COATED ORAL at 11:37

## 2022-12-26 RX ADMIN — OYSTER SHELL CALCIUM WITH VITAMIN D 1 TABLET: 500; 200 TABLET, FILM COATED ORAL at 16:31

## 2022-12-26 RX ADMIN — SODIUM CHLORIDE, PRESERVATIVE FREE 10 ML: 5 INJECTION INTRAVENOUS at 02:08

## 2022-12-26 RX ADMIN — OXYCODONE 10 MG: 5 TABLET ORAL at 22:36

## 2022-12-26 RX ADMIN — BUSPIRONE HYDROCHLORIDE 10 MG: 10 TABLET ORAL at 22:36

## 2022-12-26 RX ADMIN — TRAZODONE HYDROCHLORIDE 50 MG: 100 TABLET ORAL at 22:36

## 2022-12-26 RX ADMIN — BUSPIRONE HYDROCHLORIDE 10 MG: 10 TABLET ORAL at 16:31

## 2022-12-26 RX ADMIN — ASPIRIN 81 MG: 81 TABLET, COATED ORAL at 02:05

## 2022-12-26 RX ADMIN — DULOXETINE HYDROCHLORIDE 60 MG: 30 CAPSULE, DELAYED RELEASE ORAL at 11:38

## 2022-12-26 RX ADMIN — ASPIRIN 81 MG: 81 TABLET, COATED ORAL at 11:38

## 2022-12-26 RX ADMIN — HYDROXYZINE HYDROCHLORIDE 10 MG: 10 TABLET ORAL at 22:36

## 2022-12-26 RX ADMIN — ACETAMINOPHEN 1000 MG: 500 TABLET, FILM COATED ORAL at 22:36

## 2022-12-26 RX ADMIN — BUSPIRONE HYDROCHLORIDE 10 MG: 10 TABLET ORAL at 11:55

## 2022-12-26 RX ADMIN — ACETAMINOPHEN 1000 MG: 500 TABLET, FILM COATED ORAL at 08:16

## 2022-12-26 RX ADMIN — SODIUM CHLORIDE, PRESERVATIVE FREE 10 ML: 5 INJECTION INTRAVENOUS at 08:16

## 2022-12-26 RX ADMIN — OYSTER SHELL CALCIUM WITH VITAMIN D 1 TABLET: 500; 200 TABLET, FILM COATED ORAL at 08:16

## 2022-12-26 RX ADMIN — ASPIRIN 81 MG: 81 TABLET, COATED ORAL at 22:36

## 2022-12-26 RX ADMIN — ACETAMINOPHEN 1000 MG: 500 TABLET, FILM COATED ORAL at 16:36

## 2022-12-26 RX ADMIN — SENNOSIDES AND DOCUSATE SODIUM 1 TABLET: 50; 8.6 TABLET ORAL at 08:16

## 2022-12-26 RX ADMIN — SENNOSIDES AND DOCUSATE SODIUM 1 TABLET: 50; 8.6 TABLET ORAL at 22:37

## 2022-12-26 NOTE — PROGRESS NOTES
Problem: Falls - Risk of  Goal: *Absence of Falls  Description: Document Mathew Jolly Fall Risk and appropriate interventions in the flowsheet. Outcome: Progressing Towards Goal  Note: Fall Risk Interventions:  Mobility Interventions: Communicate number of staff needed for ambulation/transfer, Patient to call before getting OOB, PT Consult for mobility concerns, Utilize walker, cane, or other assistive device, Utilize gait belt for transfers/ambulation, PT Consult for assist device competence, Strengthening exercises (ROM-active/passive)    Mentation Interventions: Door open when patient unattended, Toileting rounds    Medication Interventions: Patient to call before getting OOB, Teach patient to arise slowly, Bed/chair exit alarm    Elimination Interventions: Call light in reach, Elevated toilet seat, Patient to call for help with toileting needs, Bed/chair exit alarm, Toilet paper/wipes in reach    History of Falls Interventions:  Investigate reason for fall, Room close to nurse's station, Utilize gait belt for transfer/ambulation, Door open when patient unattended, Bed/chair exit alarm

## 2022-12-26 NOTE — PROGRESS NOTES
Problem: Self Care Deficits Care Plan (Adult)  Goal: *Acute Goals and Plan of Care (Insert Text)  Description: FUNCTIONAL STATUS PRIOR TO ADMISSION: Patient was independent and active without use of DME.    HOME SUPPORT: The patient lived alone with no assistance. Occupational Therapy Goals  Initiated 12/23/2022  1. Patient will perform lower body ADLs with AE supervision/set-up within 4 day(s). 2.  Patient will perform upper body ADLs standing 5 mins without fatigue or LOB with supervision/set-up within 4 day(s). 3.  Patient will perform toilet transfer with supervision/set-up within 4 day(s). 4.  Patient will perform all aspects of toileting with supervision/set-up within 4 day(s). 5.  Patient will participate in upper extremity therapeutic exercise/activities with supervision/set-up for 10 minutes within 4 day(s). 6.  Patient will utilize energy conservation techniques during functional activities without cues within 4 day(s). Outcome: Progressing Towards Goal     OCCUPATIONAL THERAPY TREATMENT  Patient: Jude Solorio (72 y.o. female)  Date: 12/26/2022  Diagnosis: Hip fracture ([facility] [S72.009A] <principal problem not specified>  Procedure(s) (LRB):  RIGHT BIPOLAR HIP ANTERIOR APPROACH (Right) 4 Days Post-Op  Precautions: WBAT  Chart, occupational therapy assessment, plan of care, and goals were reviewed. ASSESSMENT  Patient continues with skilled OT services and is progressing towards goals. Patient is limited by deficits in standing balance, muscular endurance, functional mobility, pain, and generalized weakness. She requires CGA - min A for all transfers, toileting, and simple grooming tasks. Patient educated on home safety, DME, and home management tasks given her current impairments and limited social support. Patient is highly motivated and tolerates the session well overall. She is agreeable to discuss rehab options as she is not safe to return home without 24/7 assistance.  OT recs IPR and CM has been made aware. Patient can tolerate anticipated 3 hours of intensive therapy. Current Level of Function Impacting Discharge (ADLs): min A    Other factors to consider for discharge: lives alone, limited social support during the day, higher PLOF         PLAN :  Patient continues to benefit from skilled intervention to address the above impairments. Continue treatment per established plan of care to address goals. Recommend with staff: up to chair 3x/day for meals     Recommend next OT session: standing grooming, UB therex, ADLs, IADLs    Recommendation for discharge: (in order for the patient to meet his/her long term goals)  Therapy 3 hours per day 5-7 days per week    This discharge recommendation:  Has been made in collaboration with the attending provider and/or case management    IF patient discharges home will need the following DME: bedside commode, hospital bed, shower chair, and wheelchair       SUBJECTIVE:   Patient stated I don't want to go home and fall.     OBJECTIVE DATA SUMMARY:   Cognitive/Behavioral Status:  Neurologic State: Alert  Orientation Level: Oriented X4  Cognition: Appropriate decision making  Perception: Appears intact  Perseveration: No perseveration noted  Safety/Judgement: Awareness of environment    Functional Mobility and Transfers for ADLs:  Bed Mobility:  Supine to Sit: Contact guard assistance  Sit to Supine:  (did not return to bed)  Scooting: Contact guard assistance    Transfers:  Sit to Stand: Minimum assistance  Functional Transfers  Bathroom Mobility: Minimum assistance  Toilet Transfer : Minimum assistance  Adaptive Equipment: Bedside commode  Bed to Chair: Minimum assistance    Balance:  Sitting: Intact; Without support  Standing: Impaired; With support  Standing - Static: Good;Constant support  Standing - Dynamic : Fair;Constant support    ADL Intervention:  Feeding  Drink to Mouth: Independent; Set-up    Grooming  Position Performed: Seated in chair  Brushing Teeth: Set-up  Brushing/Combing Hair: Set-up    Toileting  Toileting Assistance: Minimum assistance  Bladder Hygiene: Stand-by assistance  Bowel Hygiene: Stand-by assistance  Clothing Management: Stand-by assistance    Cognitive Retraining  Safety/Judgement: Awareness of environment    Precautions: Patient recalled and demonstrated avoiding extreme planes of movement with Right LE during ADLs and functional mobility with minimal cues. Bathing: Patient instructed when bathing to not submerge wound in water, stand to shower or sponge bathe, cover wound with plastic and tape to ensure no water reaches bandage/wound without cues. Patient indicated understanding. Dressing joint: Patient instructed and demonstrated understanding to don/doff Right LE first/last with minimal cues. Patient instructed and demonstrated to don all clothing while sitting prior to standing, doff all clothing to knees while standing, then sit to doff clothing off from knees to feet to facilitate fall prevention, pain management, and energy conservation with Minimum assistance. Dressing joint reach exercise: To increase independence with lower body dressing, patient instructed and demonstrated to reach down Right LE in a seated position slowly to prevent tearing/shearing until slight pull is felt, hold at end range for 10 seconds, then return to starting upright position with Minimum assistance. Patient instructed to complete three sets of three repetitions each daily. Home safety: Patient instructed on home modifications and safety (raise height of ADL objects, appropriate height of chair surfaces, recliner safety, change of floor surfaces, clear pathways) to increase independence and fall prevention. Patient indicated understanding. Standing: Patient instructed and demonstrated to walk up to sink/countertop/surfaces, step into walker to increase safety of joint and fall prevention with Minimum assistance. Instructed to apply concept of hip contraindications to ADLs within the home (no extreme reaching across body to Right side, square off while using objects, slide objects along surfaces). Patient instructed to increase amount of time standing, observe standing position during ADLs to increase even weight bearing through bilateral LEs to increase independence with ADLs. Goal to be reached 30 days post - op, per orthopedic surgeon or per PT. Patient indicated understanding. Tub transfer: Patient instructed regarding when it is safe to begin transfer into tub (complete stairs with PT, advance exercises with PT high enough to clear tub height). Patient instructed to use the same technique as used with stairs when entering and exiting tub (\"up with the non-surgical, down with the surgical leg\"). Patient indicated understanding  Pain:  7/10 R hip    Activity Tolerance:   Good    After treatment patient left in no apparent distress:   Sitting in chair, Call bell within reach, and Bed / chair alarm activated    COMMUNICATION/COLLABORATION:   The patients plan of care was discussed with: Registered nurse and Case management.      Sonia Coleman OT  Time Calculation: 40 mins

## 2022-12-26 NOTE — PROGRESS NOTES
Pt Is A/O x 4 no significant findings or changes during the shift. All medications have been given. Report including the following information, SBAR, Kardex, ED, Summary, Procedure summary. Intact/output, MAR, recent results, and cardiac rhythm shift report was giving to Reyes Clancy. Problem: Falls - Risk of  Goal: *Absence of Falls  Description: Document Melvin Lozada Fall Risk and appropriate interventions in the flowsheet.   Outcome: Progressing Towards Goal  Note: Fall Risk Interventions:  Mobility Interventions: Bed/chair exit alarm    Mentation Interventions: Door open when patient unattended    Medication Interventions: Evaluate medications/consider consulting pharmacy    Elimination Interventions: Bed/chair exit alarm, Call light in reach, Patient to call for help with toileting needs    History of Falls Interventions: Bed/chair exit alarm         Problem: Patient Education: Go to Patient Education Activity  Goal: Patient/Family Education  Outcome: Progressing Towards Goal     Problem: Pain  Goal: *Control of Pain  Outcome: Progressing Towards Goal     Problem: Patient Education: Go to Patient Education Activity  Goal: Patient/Family Education  Outcome: Progressing Towards Goal     Problem: Patient Education: Go to Patient Education Activity  Goal: Patient/Family Education  Outcome: Progressing Towards Goal     Problem: Patient Education: Go to Patient Education Activity  Goal: Patient/Family Education  Outcome: Progressing Towards Goal     Problem: Patient Education: Go to Patient Education Activity  Goal: Patient/Family Education  Outcome: Progressing Towards Goal

## 2022-12-26 NOTE — PROGRESS NOTES
Transition Of Care: IPR referrals pending with [facility]. Previous plan was home with [facility] with bridge to home. Attending agrees with plan. BLS to transport. RUR: 10%    The patient lives alone and doesn't have enough help to return to home and now prefers to go to rehab. CM following for discharge needs.     Igor Mas RN/CRM

## 2022-12-26 NOTE — PROGRESS NOTES
Problem: Mobility Impaired (Adult and Pediatric)  Goal: *Acute Goals and Plan of Care (Insert Text)  Description: FUNCTIONAL STATUS PRIOR TO ADMISSION: Patient was independent and active without use of DME.     HOME SUPPORT PRIOR TO ADMISSION: The patient lived alone and did not require assist - has friends/neighbors. Physical Therapy Goals  Initiated 12/23/2022  1. Patient will move from supine to sit and sit to supine  and scoot up and down in bed with modified independence within 7 day(s). 2.  Patient will transfer from bed to chair and chair to bed with modified independence using the least restrictive device within 7 day(s). 3.  Patient will perform sit to stand with modified independence within 7 day(s). 4.  Patient will ambulate with modified independence for > 150 feet with the least restrictive device within 7 day(s). 5.  Patient will ascend/descend 4 stairs with one handrail(s) with supervision within 7 day(s). Outcome: Progressing Towards Goal   PHYSICAL THERAPY TREATMENT  Patient: Fatoumata Bustos (56 y.o. female)  Date: 12/26/2022  Diagnosis: Hip fracture ([address] [S72.009A] <principal problem not specified>  Procedure(s) (LRB):  RIGHT BIPOLAR HIP ANTERIOR APPROACH (Right) 4 Days Post-Op  Precautions: WBAT  Chart, physical therapy assessment, plan of care and goals were reviewed. ASSESSMENT  Patient continues with skilled PT services and is progressing towards goals. She is continuing to make progress with her overall mobility but is still very limited in her transfers and gait distance, moving very slowly overall. She was able to advance her ambulation distance to about 15' this session, but not able to walk farther due to fatigue and pain. Instructed in method to use strap to assist RLE into bed and expect that she will have an easier time getting in and out of the bed to her L side next session.     Since her mobility is so limited still and she lives alone, highly recommend inpatient rehab prior to return home. She was independent prior to admission, and expect that she will be able to regain independence with appropriate rehab. Current Level of Function Impacting Discharge (mobility/balance): min to mod assist for transfers and short distance ambulation    Other factors to consider for discharge: lives alone         PLAN :  Patient continues to benefit from skilled intervention to address the above impairments. Continue treatment per established plan of care. to address goals. Recommendation for discharge: (in order for the patient to meet his/her long term goals)  Therapy 3 hours per day 5-7 days per week    This discharge recommendation:  Has been made in collaboration with the attending provider and/or case management    IF patient discharges home will need the following DME: to be determined (TBD)       SUBJECTIVE:   Patient stated I want to get better.     OBJECTIVE DATA SUMMARY:   Critical Behavior:  Neurologic State: Alert  Orientation Level: Oriented X4  Cognition: Appropriate decision making  Safety/Judgement: Awareness of environment  Functional Mobility Training:  Bed Mobility:     Supine to Sit: Contact guard assistance  Sit to Supine: Moderate assistance; Additional time; Adaptive equipment (using strap to assist RLE)  Scooting: Contact guard assistance        Transfers:  Sit to Stand: Minimum assistance  Stand to Sit: Minimum assistance        Bed to Chair: Minimum assistance                    Balance:  Sitting: Intact; Without support  Standing: Impaired; With support (with hands on walker)  Standing - Static: Fair;Constant support  Standing - Dynamic : Fair;Constant support  Ambulation/Gait Training:  Distance (ft): 15 Feet (ft)  Assistive Device: Gait belt;Walker, rolling  Ambulation - Level of Assistance: Contact guard assistance; Adaptive equipment; Additional time        Gait Abnormalities: Antalgic;Decreased step clearance        Base of Support: Shift to right;Center of gravity altered  Stance: Right decreased  Speed/Laura: Slow;Shuffled  Step Length: Right shortened;Left shortened  Swing Pattern: Right asymmetrical                 Stairs: Therapeutic Exercises:     Pain Rating:  Minimal to moderate pain, got pain meds prior to therapy session    Activity Tolerance:   Fair and requires rest breaks    After treatment patient left in no apparent distress:   Supine in bed, Call bell within reach, Bed / chair alarm activated, and Side rails x 3    COMMUNICATION/COLLABORATION:   The patients plan of care was discussed with: Occupational therapist, Registered nurse, and Case management.      Charisma Correa, PT   Time Calculation: 24 mins

## 2022-12-26 NOTE — PROGRESS NOTES
Physician Progress Note      PATIENT:               Laney Ferguson  CSN #:                  [id number]  :                       1940  ADMIT DATE:       2022 3:21 PM  DISCH DATE:  RESPONDING  PROVIDER #:        CL WALKER NP          QUERY TEXT:    Pt admitted with Right Femoral Neck. Pt noted to have mild bilateral hip osteoarthritis. If possible, please document in progress notes and discharge summary if you are evaluating and/or treating any of the following: The medical record reflects the following:  Risk Factors: 81yo with mild bilateral hip osteoarthritis and mild right knee osteoarthritis    Clinical Indicators:  X-rays: Right hip and right knee osteoarthritis. Primary dx: Femoral neck fracture right hip    Treatment: x-rays, ortho following, Right bipolar hemiarthroplasty    Thank you,  Surekha Orozco  [phone]  I am also available via [employer]. Options provided:  -- Pathological Right Femoral Neck fracture due to osteopenia following fall which would not usually break a normal, healthy bone  -- Osteoporotic Right Femoral Neck Fracture  -- Osteoporotic Right Femoral Neck fracture following fall which would not usually break a normal, healthy bone  -- Traumatic Right Femoral Neck fracture  -- Other - I will add my own diagnosis  -- Disagree - Not applicable / Not valid  -- Disagree - Clinically unable to determine / Unknown  -- Refer to Clinical Documentation Reviewer    PROVIDER RESPONSE TEXT:    This patient has a traumatic Right Femoral Neck fracture.     Query created by: Yajaira Washington on 2022 1:59 PM      Electronically signed by:  Miguel A WALKER NP 2022 3:37 PM

## 2022-12-26 NOTE — PROGRESS NOTES
[facility] Adult  Hospitalist Group                                                                                          Hospitalist Progress Note  Wali Cagle PA-C  Answering service: [phone] from in house phone        Date of Service:  2022  NAME:  Nisreen Valerio  :  1940  MRN:  [id number]    Admission Summary:   Nisreen Valerio is a 81 y.o. female with PMHx of HTN, MI with CAD, and MDD who presented to the ED after a fall on 22 with imaging revealing displaced right femoral neck fracture. She underwent R bipolar hemiarthroplasty on 22 with Orthopedic Surgeon, Dr. Ludivina Cobb. Now working toward disposition. Interval history / Subjective:   Patient reports overall feeling well today. She is having pain at the surgical site that is moderately well controlled in the pain. She denies any chest pain, SOB, abdominal pain, N/V/D, dysuria, or issues with her bowels. She does not feel safe with discharging today. After rounds the care coordinator reached out that she does not have a lot of assistance at home and therapy is recommending IPR v SNF and patient is considering. I agree should have referrals sent to be able to provide her all options regarding disposition. Assessment & Plan:     GLF resulting in displaced R femoral neck fracture  -- Appreciate Orthopedic Surgery team involvement. Signed off on 22  -- s/p Right bipolar hemiarthroplasty on  with Dr. Ludivina Cobb  -- Pain: Continued Tylenol 1g q8h and breakthrough pain with Oxycodone 10mg q4h PRN and Tramadol 50mg q6h PRN. Can hopefully consolidate this regimen in upcoming days.  On standing bowel regimen  -- DVT PPx: ASA 81mg BID and SCDs  -- Dressing: Leave in place if it remains dry and intact; change as needed for saturation with dry sterile island dressing  -- Activity: WBAT  -- Appreciate PT/OT involvement  -- Follow-up: Needs to follow-up in 2 weeks with  [facility]    HTN  -- On regimen Hyzaar 50-12.5mg daily. Will need to confirm additional home medications and restart as needed. Home regimen appears to be Benicar but likely replaced by hospital formulary  -- Blood pressure remains elevated. Continue trending for now. Anticipate this will improve as she gets farther out from surgery and her pain improves    TUAN  -- Continue home Buspar 10mg TID and Cymbalta 60mg daily     Dyslipidemia  -- Continue home Lipitor 40mg daily     NIDDM II:  -- Not on sliding scale insulin during admission. Glucose moderately well controlled  -- Hgb A1c of 6.9% on 10/14/22  -- Home regimen includes Metformin 500mg BID. Will likely resume on discharge     Insomnia  -- Continue home Trazodone nightly     Code status: Full  Prophylaxis: SCDs/BID ASA  Care Plan discussed with: patient, Care coordinator  Anticipated Disposition: Now screening for placement     Hospital Problems  Date Reviewed: 8/8/2011            Codes Class Noted POA    Hip fracture [facility]) ICD-10-CM: L19.048X  ICD-9-CM: 820.8  12/21/2022 Unknown             Review of Systems:   A comprehensive review of systems was negative except for that written in the HPI. Vital Signs:    Last 24hrs VS reviewed since prior progress note. Most recent are:  Visit Vitals  BP (!) 151/63 (BP 1 Location: Right upper arm, BP Patient Position: Lying)   Pulse 72   Temp 98.1 °F (36.7 °C)   Resp 18   Wt 76.6 kg (168 lb 12.8 oz)   SpO2 93%   BMI 32.97 kg/m²       No intake or output data in the 24 hours ending 12/26/22 1601     Physical Examination:     I had a face to face encounter with this patient and independently examined them on 12/26/2022 as outlined below:          Constitutional:  No acute distress, cooperative, pleasant    ENT:  Oral mucosa moist, oropharynx benign. Resp:  CTA bilaterally. No wheezing/rhonchi/rales. No accessory muscle use.     CV:  Regular rhythm, normal rate, no murmurs    GI:  Soft, non distended, non tender Musculoskeletal:  No edema, warm. Surgical site is c/d/i    Neurologic:  Moves all extremities. AAOx3            Data Review:    Review and/or order of clinical lab test  Review and/or order of tests in the radiology section of CPT  Review and/or order of tests in the medicine section of CPT      Labs:     Recent Labs     12/24/22  0535   HGB 10.0*     Recent Labs     12/24/22  0535      K 4.2      CO2 27   BUN 17   CREA 1.08*   *   CA 8.0*     No results for input(s): ALT, AP, TBIL, TBILI, TP, ALB, GLOB, GGT, AML, LPSE in the last 72 hours. No lab exists for component: SGOT, GPT, AMYP, HLPSE  No results for input(s): INR, PTP, APTT, INREXT in the last 72 hours. No results for input(s): FE, TIBC, PSAT, FERR in the last 72 hours. Lab Results   Component Value Date/Time    Folate >24.0 (H) 08/03/2009 03:59 PM      No results for input(s): PH, PCO2, PO2 in the last 72 hours. No results for input(s): CPK, CKNDX, TROIQ in the last 72 hours.     No lab exists for component: CPKMB  Lab Results   Component Value Date/Time    Cholesterol, total 142 10/14/2022 10:06 AM    HDL Cholesterol 74 10/14/2022 10:06 AM    LDL, calculated 55.4 10/14/2022 10:06 AM    Triglyceride 63 10/14/2022 10:06 AM    CHOL/HDL Ratio 1.9 10/14/2022 10:06 AM     Lab Results   Component Value Date/Time    Glucose (POC) 146 (H) 12/26/2022 11:14 AM    Glucose (POC) 106 12/25/2022 10:52 PM    Glucose (POC) 99 12/25/2022 04:16 PM    Glucose (POC) 126 (H) 12/25/2022 11:19 AM    Glucose (POC) 125 (H) 12/25/2022 07:00 AM     Lab Results   Component Value Date/Time    Color YELLOW/STRAW 10/16/2022 02:23 PM    Appearance CLEAR 10/16/2022 02:23 PM    Specific gravity 1.014 10/16/2022 02:23 PM    pH (UA) 5.0 10/16/2022 02:23 PM    Protein Negative 10/16/2022 02:23 PM    Glucose Negative 10/16/2022 02:23 PM    Ketone TRACE (A) 10/16/2022 02:23 PM    Bilirubin Negative 10/16/2022 02:23 PM    Urobilinogen 0.2 10/16/2022 02:23 PM Nitrites Negative 10/16/2022 02:23 PM    Leukocyte Esterase SMALL (A) 10/16/2022 02:23 PM    Epithelial cells FEW 10/16/2022 02:23 PM    Bacteria Negative 10/16/2022 02:23 PM    WBC 5-10 10/16/2022 02:23 PM    RBC 0-5 10/16/2022 02:23 PM         Medications Reviewed:     Current Facility-Administered Medications   Medication Dose Route Frequency    atorvastatin (LIPITOR) tablet 40 mg  40 mg Oral DAILY    busPIRone (BUSPAR) tablet 10 mg  10 mg Oral TID    traZODone (DESYREL) tablet 50 mg  50 mg Oral QHS    DULoxetine (CYMBALTA) capsule 60 mg  60 mg Oral DAILY    losartan/hydroCHLOROthiazide (HYZAAR) 50/12.5 mg   Oral DAILY    hydrALAZINE (APRESOLINE) 20 mg/mL injection 20 mg  20 mg IntraVENous Q6H PRN    oxyCODONE IR (ROXICODONE) tablet 10 mg  10 mg Oral Q4H PRN    acetaminophen (TYLENOL) tablet 1,000 mg  1,000 mg Oral Q8H    sodium chloride (NS) flush 5-40 mL  5-40 mL IntraVENous Q8H    sodium chloride (NS) flush 5-40 mL  5-40 mL IntraVENous PRN    ondansetron (ZOFRAN ODT) tablet 4 mg  4 mg Oral Q8H PRN    Or    ondansetron (ZOFRAN) injection 4 mg  4 mg IntraVENous Q6H PRN    polyethylene glycol (MIRALAX) packet 17 g  17 g Oral DAILY    senna-docusate (PERICOLACE) 8.6-50 mg per tablet 1 Tablet  1 Tablet Oral BID    naloxone (NARCAN) injection 0.4 mg  0.4 mg IntraVENous EVERY 2 MINUTES AS NEEDED    sodium chloride (NS) flush 5-40 mL  5-40 mL IntraVENous PRN    traMADoL (ULTRAM) tablet 50 mg  50 mg Oral Q6H PRN    hydrOXYzine HCL (ATARAX) tablet 10 mg  10 mg Oral Q8H PRN    naloxone (NARCAN) injection 0.4 mg  0.4 mg IntraVENous PRN    calcium-vitamin D (OS-ONESIMO +D3) 500 mg-200 unit per tablet 1 Tablet  1 Tablet Oral TID WITH MEALS    bisacodyL (DULCOLAX) suppository 10 mg  10 mg Rectal DAILY PRN    aspirin delayed-release tablet 81 mg  81 mg Oral BID     ______________________________________________________________________  EXPECTED LENGTH OF STAY: 4d 0h  ACTUAL LENGTH OF STAY:          5                 Cy A BLAKE Cagle

## 2022-12-27 VITALS
TEMPERATURE: 97.7 F | RESPIRATION RATE: 15 BRPM | OXYGEN SATURATION: 93 % | HEART RATE: 71 BPM | WEIGHT: 168.8 LBS | DIASTOLIC BLOOD PRESSURE: 83 MMHG | SYSTOLIC BLOOD PRESSURE: 162 MMHG | BODY MASS INDEX: 32.97 KG/M2

## 2022-12-27 LAB
FLUAV RNA SPEC QL NAA+PROBE: NOT DETECTED
FLUBV RNA SPEC QL NAA+PROBE: NOT DETECTED
GLUCOSE BLD STRIP.AUTO-MCNC: 109 MG/DL (ref 65–117)
GLUCOSE BLD STRIP.AUTO-MCNC: 118 MG/DL (ref 65–117)
GLUCOSE BLD STRIP.AUTO-MCNC: 207 MG/DL (ref 65–117)
SARS-COV-2, COV2: NOT DETECTED
SERVICE CMNT-IMP: ABNORMAL
SERVICE CMNT-IMP: ABNORMAL
SERVICE CMNT-IMP: NORMAL

## 2022-12-27 PROCEDURE — 82962 GLUCOSE BLOOD TEST: CPT

## 2022-12-27 PROCEDURE — 97535 SELF CARE MNGMENT TRAINING: CPT

## 2022-12-27 PROCEDURE — 87636 SARSCOV2 & INF A&B AMP PRB: CPT

## 2022-12-27 PROCEDURE — 74011000250 HC RX REV CODE- 250: Performed by: ORTHOPAEDIC SURGERY

## 2022-12-27 PROCEDURE — 97530 THERAPEUTIC ACTIVITIES: CPT

## 2022-12-27 PROCEDURE — 74011250637 HC RX REV CODE- 250/637: Performed by: ORTHOPAEDIC SURGERY

## 2022-12-27 RX ORDER — OXYCODONE HYDROCHLORIDE 10 MG/1
10 TABLET ORAL
Qty: 28 TABLET | Refills: 0 | Status: SHIPPED | OUTPATIENT
Start: 2022-12-27 | End: 2023-01-03

## 2022-12-27 RX ORDER — ASPIRIN 81 MG/1
81 TABLET ORAL 2 TIMES DAILY
Status: SHIPPED | COMMUNITY
Start: 2022-12-27

## 2022-12-27 RX ORDER — CARVEDILOL 6.25 MG/1
6.25 TABLET ORAL 2 TIMES DAILY WITH MEALS
Status: SHIPPED | COMMUNITY
Start: 2022-12-27

## 2022-12-27 RX ADMIN — ATORVASTATIN CALCIUM 40 MG: 40 TABLET, FILM COATED ORAL at 10:10

## 2022-12-27 RX ADMIN — SODIUM CHLORIDE, PRESERVATIVE FREE 10 ML: 5 INJECTION INTRAVENOUS at 05:28

## 2022-12-27 RX ADMIN — OYSTER SHELL CALCIUM WITH VITAMIN D 1 TABLET: 500; 200 TABLET, FILM COATED ORAL at 10:10

## 2022-12-27 RX ADMIN — HYDROCHLOROTHIAZIDE: 25 TABLET ORAL at 10:08

## 2022-12-27 RX ADMIN — TRAMADOL HYDROCHLORIDE 50 MG: 50 TABLET, FILM COATED ORAL at 10:10

## 2022-12-27 RX ADMIN — ASPIRIN 81 MG: 81 TABLET, COATED ORAL at 10:10

## 2022-12-27 RX ADMIN — DULOXETINE HYDROCHLORIDE 60 MG: 30 CAPSULE, DELAYED RELEASE ORAL at 10:08

## 2022-12-27 RX ADMIN — TRAMADOL HYDROCHLORIDE 50 MG: 50 TABLET, FILM COATED ORAL at 17:20

## 2022-12-27 RX ADMIN — ACETAMINOPHEN 1000 MG: 500 TABLET, FILM COATED ORAL at 05:27

## 2022-12-27 RX ADMIN — BUSPIRONE HYDROCHLORIDE 10 MG: 10 TABLET ORAL at 10:08

## 2022-12-27 NOTE — PROGRESS NOTES
Transition Of Care: The patient is discharging to [address] [place] [address] today with [facility] transport at 4pm. RN to call report to: [phone]. The patient is going to room 2056. RUR: 9%    LiaUriel mccarty ([phone]) at [facility] has accepted the patient with Dr. Jacelyn Skiff following. CM specialist setting up a new PCP appointment. Medicare pt has received, reviewed, and signed 2nd IM letter informing them of their right to appeal the discharge. Signed copied has been placed on pt bedside chart. CM following for discharge needs.     Jan Dior RN/CRM

## 2022-12-27 NOTE — PROGRESS NOTES
Bedside and Verbal shift change report given to Ruel Herr RN (oncoming nurse) by Gisele Garcia LPN (offgoing nurse). Report included the following information SBAR, Kardex, ED Summary, Procedure Summary, Intake/Output, MAR, Accordion, Recent Results, and Med Rec Status.

## 2022-12-27 NOTE — PROGRESS NOTES
Problem: Self Care Deficits Care Plan (Adult)  Goal: *Acute Goals and Plan of Care (Insert Text)  Description: FUNCTIONAL STATUS PRIOR TO ADMISSION: Patient was independent and active without use of DME.    HOME SUPPORT: The patient lived alone with no assistance. Occupational Therapy Goals  Initiated 12/23/2022  1. Patient will perform lower body ADLs with AE supervision/set-up within 4 day(s). 2.  Patient will perform upper body ADLs standing 5 mins without fatigue or LOB with supervision/set-up within 4 day(s). 3.  Patient will perform toilet transfer with supervision/set-up within 4 day(s). 4.  Patient will perform all aspects of toileting with supervision/set-up within 4 day(s). 5.  Patient will participate in upper extremity therapeutic exercise/activities with supervision/set-up for 10 minutes within 4 day(s). 6.  Patient will utilize energy conservation techniques during functional activities without cues within 4 day(s). Outcome: Progressing Towards Goal   OCCUPATIONAL THERAPY TREATMENT  Patient: Leanne Uribe (65 y.o. female)  Date: 12/27/2022  Diagnosis: Hip fracture ([address] [S72.009A] <principal problem not specified>  Procedure(s) (LRB):  RIGHT BIPOLAR HIP ANTERIOR APPROACH (Right) 5 Days Post-Op  Precautions: WBAT  Chart, occupational therapy assessment, plan of care, and goals were reviewed. ASSESSMENT  Patient continues with skilled OT services and is progressing towards goals. Patient received in bed eager to mobilize with good motivation to progress, up to bedside commode 2/2 urgency with RW and intermittent CGA-Min A for RW management. Patient unable to recall hip precautions without cueing this date but able to demo understanding with activity today and adheres to precautions without cueing throughout. Functional ambulation to recliner chair and setup for breakfast at end of session, recommend IPR at discharge as patient is below baseline, high falls risk and lives alone. Vitals:    12/27/22 0907 12/27/22 0915 12/27/22 0920   BP: (!) 171/79 (!) 179/93 (!) 188/93   BP 1 Location:      BP Patient Position: Supine Sitting Sitting;Reclining        Current Level of Function Impacting Discharge (ADLs): Min A transfers/bed mobility    Other factors to consider for discharge: PLOF, lives alone, needs rehab prior to return home         PLAN :  Patient continues to benefit from skilled intervention to address the above impairments. Continue treatment per established plan of care to address goals. Recommend with staff: OOB to recliner as tolerated. OOB to BSC/bathroom with assist x1 and RW for safety     Recommend next OT session: Progress goals    Recommendation for discharge: (in order for the patient to meet his/her long term goals)  Therapy 3 hours per day 5-7 days per week    This discharge recommendation:  Has been made in collaboration with the attending provider and/or case management    IF patient discharges home will need the following DME: RW has been delivered for discharge       SUBJECTIVE:   Patient stated I feel overwhelmed with all of this.  re: decisions surrounding disposition     OBJECTIVE DATA SUMMARY:   Cognitive/Behavioral Status:  Neurologic State: Alert  Orientation Level: Oriented X4  Cognition: Appropriate decision making; Appropriate safety awareness; Appropriate for age attention/concentration  Perception: Appears intact  Perseveration: No perseveration noted  Safety/Judgement: Awareness of environment; Fall prevention    Functional Mobility and Transfers for ADLs:  Bed Mobility:  Rolling: Contact guard assistance  Supine to Sit: Minimum assistance  Scooting: Minimum assistance    Transfers:  Sit to Stand: Minimum assistance;Assist x1;Additional time     Bed to Chair: Minimum assistance;Assist x1;Additional time    Balance:  Sitting: Intact; Without support  Sitting - Static: Good (unsupported)  Sitting - Dynamic: Fair (occasional)  Standing: Impaired; With support (RW)  Standing - Static: Fair;Constant support  Standing - Dynamic : Fair;Constant support    ADL Intervention:       Toileting  Toileting Assistance: Minimum assistance  Bladder Hygiene: Set-up; Stand-by assistance  Bowel Hygiene: Set-up; Stand-by assistance  Clothing Management: Minimum assistance    Cognitive Retraining  Safety/Judgement: Awareness of environment; Fall prevention  Precautions: Patient recalled and demonstrated avoiding extreme planes of movement with Right LE during ADLs and functional mobility with minimal cues. Home safety: Patient instructed on home modifications and safety (raise height of ADL objects, appropriate height of chair surfaces, recliner safety, change of floor surfaces, clear pathways) to increase independence and fall prevention. Patient indicated understanding. Standing: Patient instructed and demonstrated to walk up to sink/countertop/surfaces, step into walker to increase safety of joint and fall prevention with Minimum assistance. Instructed to apply concept of hip contraindications to ADLs within the home (no extreme reaching across body to Right side, square off while using objects, slide objects along surfaces). Patient instructed to increase amount of time standing, observe standing position during ADLs to increase even weight bearing through bilateral LEs to increase independence with ADLs. Goal to be reached 30 days post - op, per orthopedic surgeon or per PT. Patient indicated understanding. Tub transfer: Patient instructed regarding when it is safe to begin transfer into tub (complete stairs with PT, advance exercises with PT high enough to clear tub height). Patient instructed to use the same technique as used with stairs when entering and exiting tub (\"up with the non-surgical, down with the surgical leg\").   Patient indicated understanding    Pain:  6-7/10    Activity Tolerance:   Fair, requires rest breaks, and observed SOB with activity    After treatment patient left in no apparent distress:   Sitting in chair, Call bell within reach, and Bed / chair alarm activated    COMMUNICATION/COLLABORATION:   The patients plan of care was discussed with: Physical therapist and Registered nurse.      Juan Alberto Wild OT  Time Calculation: 28 mins

## 2022-12-27 NOTE — DISCHARGE SUMMARY
Discharge Summary       PATIENT ID: Clement Cavazos  MRN: [id number]   YOB: 1940    DATE OF ADMISSION: 12/21/2022  3:21 PM    DATE OF DISCHARGE: 12/27/22   PRIMARY CARE PROVIDER: None     ATTENDING PHYSICIAN: Dr. Monica Lomas  DISCHARGING PROVIDER: Wali Cagle PA-C    To contact this individual call [phone] and ask the  to page. If unavailable ask to be transferred the Adult Hospitalist Department. CONSULTATIONS: IP CONSULT TO ORTHOPEDIC SURGERY    PROCEDURES/SURGERIES: Procedure(s):  RIGHT BIPOLAR HIP ANTERIOR APPROACH    DISCHARGE DIAGNOSES:   -- Displaced R femoral neck fracture    ADMISSION SUMMARY AND HOSPITAL COURSE:   Clement Cavazos is a 81 y.o. female with PMHx of HTN, MI with CAD, and MDD who presented to the ED after a fall on 12/22/22 with imaging revealing displaced right femoral neck fracture. She underwent R bipolar hemiarthroplasty on 12/22/22 with Orthopedic Surgeon, Dr. Kali Caldwell. She was ultimately cleared for discharge to [facility] on 12/27/22. She does not have a PCP and discussed with CCRN and a member of the [address] [place] [address] team will reach out to her to assist with arranging a follow-up with a PCP near her and got confirmation from the Case Management specialist will help arrange. We discussed her medications at discharge and she is not sure of the medications she takes. I reviewed with the pharmacist her recent fill of medications to help update her medication list. Prior to discharge reviewed all questions/concerns/and follow up plans with her and plan for discharge for which she is in agreement. Prior to discharge I reviewed care plans with attending MD, Dr. Fady Reeder, who was in agreement. DISCHARGE DIAGNOSES / PLAN:      GLF resulting in displaced R femoral neck fracture  -- Appreciate Orthopedic Surgery team involvement.  Signed off on 12/24/22  -- s/p Right bipolar hemiarthroplasty on 12/23 with Dr. Kali Caldwell  -- Pain: Continued Tylenol 1g q8h and breakthrough pain with Oxycodone 10mg q4h PRN. On discharge gave a paper script for Oxycodone 10mg q6h PRN for pain with #28# tablets and 0 refills  -- DVT PPx: ASA 81mg BID and SCDs. At follow-up with orthopedic surgery encouraged to discuss decreasing back to her home daily dose  -- Dressing: Leave in place if it remains dry and intact; change as needed for saturation with dry sterile island dressing  -- Activity: WBAT  -- Appreciate PT/OT involvement  -- Follow-up: Needs to follow-up in 2 weeks with Dr. Adelaida Romeo     HTN  Hx of MI, CAD, CHF  -- Reviewed recent fill BP medications with the pharmacist, but since does not have a PCP has not regularly been getting her medications  -- During hospitalization was using Hyzaar 1 tablet daily based on hospital formulary. On discharge will resume combination pill of Olmesartan-HCTX 40-12.5mg daily. -- From recent hospitalization due to hx of MI with CHF, will restart Carvedilol 6.25mg BID. Her blood pressure has been mildly high and Carvedilol can help improve  -- On ASA 81mg daily at home and now BID dosing per ortho surgery for DVT PPx. Encouraged to follow-up with them and discuss at follow-up to decrease back to daily dosing  -- Encouraged follow-up with her cardiologist     TUAN  -- Continue home Buspar 10mg TID and Cymbalta 60mg daily     Dyslipidemia  -- Continue home Lipitor 40mg daily     NIDDM II:  -- Not on sliding scale insulin during admission. Glucose moderately well controlled  -- Hgb A1c of 6.9% on 10/14/22  -- Home regimen included Metformin 500mg BID and recommended to restart     Insomnia  -- Continue home Trazodone 200mg nightly     Code status: Full  Prophylaxis: SCDs/BID ASA  Care Plan discussed with: patient, Care coordinator  Anticipated Disposition: [facility] on 12/27/22    BMI: Body mass index is 32.97 kg/m². . This patient: Meets criteria for obesity given BMI >/= 30 and < 40 due to excess calories/nutritional. Weight loss and lifestyle modifications should be encouraged as an outpatient. PENDING TEST RESULTS:   At the time of discharge the following test results are still pending: None     PENDING TEST RESULTS:   At the time of discharge the following test results are still pending: None    FOLLOW UP APPOINTMENTS:    -- Please schedule a follow-up with orthopedic surgeon, Dr. Ke Du, in 2 weeks  -- The case management team at [facility] will call you to help arrange a PCP follow-up  -- Please schedule a follow-up with your cardiologist    ADDITIONAL CARE RECOMMENDATIONS:   -- Please take all medication as prescribed  -- Do not increase the dose, frequency of the Oxycodone. Do not drive or operate machinery or drink alcohol on this medication    DIET: Regular Diet, diabetic    TUBE FEEDING INSTRUCTIONS: N/A    OXYGEN / BiPAP SETTINGS: n/a    ACTIVITY: Activity as tolerated, WBAT    WOUND CARE: Leave in place if it remains dry and intact; change as needed for saturation with dry sterile island dressing    EQUIPMENT needed: N/A    NOTIFY YOUR PHYSICIAN FOR ANY OF THE FOLLOWING:   Fever over 101 degrees for 24 hours. Chest pain, shortness of breath, fever, chills, nausea, vomiting, diarrhea, change in mentation, falling, weakness, bleeding. Severe pain or pain not relieved by medications, as well as any other signs or symptoms that you may have questions about.     FOLLOW UP APPOINTMENTS:    Follow-up Information       Follow up With Specialties Details Why Contact Info    [facility]  Follow up [address] [facility] [address] [phone]    [facility] Follow up [address]  [address] [facility] [phone]    None    None [phone]) Patient stated that they have no PCP      Vahid Da Silva MD Orthopedic Surgery Call Please clal to make a follow-up appointment in 2 weeks [address] [facility]             DISCHARGE MEDICATIONS:  Current Discharge Medication List        START taking these medications    Details   oxyCODONE IR (ROXICODONE) 10 mg tab immediate release tablet Take 1 Tablet by mouth every six (6) hours as needed for Pain for up to 7 days. Max Daily Amount: 40 mg. Take for post-operative pain  Indications: pain  Qty: 28 Tablet, Refills: 0  Start date: 12/27/2022, End date: 1/3/2023    Associated Diagnoses: Closed fracture of right hip, initial encounter ([facility]           CONTINUE these medications which have CHANGED    Details   aspirin delayed-release 81 mg tablet Take 1 Tablet by mouth two (2) times a day. Take twice a day per the Orthopedic Surgery team. At follow-up with them discuss decreasing back to daily dosing  Start date: 12/27/2022           CONTINUE these medications which have NOT CHANGED    Details   carvediloL (COREG) 6.25 mg tablet Take 1 Tablet by mouth two (2) times daily (with meals). Indications: high blood pressure  Start date: 12/27/2022      atorvastatin (LIPITOR) 40 mg tablet Take 40 mg by mouth daily. olmesartan-hydroCHLOROthiazide (BENICAR HCT) 40-12.5 mg per tablet Take 1 Tablet by mouth.      busPIRone (BUSPAR) 10 mg tablet Take 10 mg by mouth three (3) times daily. DULoxetine (CYMBALTA) 60 mg capsule Take 60 mg by mouth daily. MULTIVITAMIN PO Take  by mouth daily. trazodone (DESYREL) 100 mg tablet Take  by mouth nightly. Take two tablets at bedtime      VITAMIN A PO Take  by mouth daily. CYANOCOBALAMIN, VITAMIN B-12, (VITAMIN B-12 PO) Take  by mouth daily. ASCORBATE CALCIUM (VITAMIN C PO) Take  by mouth daily. ergocalciferol (ERGOCALCIFEROL) 1,250 mcg (50,000 unit) capsule Take 50,000 Units by mouth. Once a week       VITAMIN E ACETATE (VITAMIN E PO) Take  by mouth daily. metformin (GLUCOPHAGE) 500 mg tablet Take  by mouth two (2) times daily (with meals).            STOP taking these medications       dilTIAZem ER (TIAZAC) 240 mg capsule Comments:   Reason for Stopping:         exenatide (BYETTA) 10 mcg/dose(250 mcg/mL) 2.4 mL pnij injection Comments:   Reason for Stopping:         COD LIVER OIL PO Comments:   Reason for Stopping:         propoxyphene napsylate-acetaminophen (DARVOCET-N 100) 100-650 mg per tablet Comments:   Reason for Stopping:               DISPOSITION:    Home With:   OT  PT  HH  RN       Long term SNF/Inpatient Rehab ([facility])    Independent/assisted living    Hospice    Other:       PATIENT CONDITION AT DISCHARGE: stable and improved    Functional status    Poor     Deconditioned     Independent      Cognition     Lucid     Forgetful     Dementia      Catheters/lines (plus indication)    Ortega     PICC     PEG     None      Code status     Full code     DNR      PHYSICAL EXAMINATION AT DISCHARGE:  General:          Alert, cooperative, no distress, appears stated age. HEENT:           Atraumatic, anicteric sclerae, pink conjunctivae                          No oral ulcers, mucosa moist  Neck:               Supple  Lungs:             Clear to auscultation bilaterally. No Wheezing or Rhonchi. No rales. Chest wall:      No tenderness  No Accessory muscle use. Heart:              Regular  rhythm,  No  murmur   No edema  Abdomen:        Soft, non-tender. Not distended. Bowel sounds normal  Extremities:     No cyanosis. Skin:                surgical sites are c/d/i  Psych:             Not anxious or agitated.   Neurologic:      Alert, moves all extremities, answers questions appropriately and responds to commands       CHRONIC MEDICAL DIAGNOSES:  Problem List as of 12/27/2022 Date Reviewed: 8/8/2011            Codes Class Noted - Resolved    Hip fracture ([facility] ICD-10-CM: M09.169K  ICD-9-CM: 820.8  12/21/2022 - Present        Acute on chronic congestive heart failure, unspecified heart failure type ([facility] ICD-10-CM: I50.9  ICD-9-CM: 428.0  10/13/2022 - Present        Cervical stenosis of spine ICD-10-CM: M48.02  ICD-9-CM: 723.0  8/8/2011 - Present        Nonspecific abnormal electrocardiogram (ECG) (EKG) ICD-10-CM: R94.31  ICD-9-CM: 794.31  4/6/2011 - Present        Essential hypertension, malignant ICD-10-CM: I10  ICD-9-CM: 401.0  4/6/2011 - Present        Pure hypercholesterolemia ICD-10-CM: E78.00  ICD-9-CM: 272.0  4/6/2011 - Present        Type II or unspecified type diabetes mellitus without mention of complication, not stated as uncontrolled ICD-10-CM: E11.9  ICD-9-CM: 250.00  4/6/2011 - Present        RESOLVED: Pre-operative cardiovascular examination ICD-10-CM: Z01.810  ICD-9-CM: V72.81  4/6/2011 - 10/2/2019           Greater than 20 minutes were spent with the patient on counseling and coordination of care    Signed:   Wali Cagle PA-C  12/27/2022  12:54 PM

## 2022-12-27 NOTE — PROGRESS NOTES
Problem: Patient Education: Go to Patient Education Activity  Goal: Patient/Family Education  12/27/2022 1344 by Ori, Shayla PINK  Outcome: Progressing Towards Goal  Note:   HOME SUPPORT PRIOR TO ADMISSION: The patient lived alone and did not require assist - has friends/neighbors. Physical Therapy Goals  Initiated 12/23/2022  1. Patient will move from supine to sit and sit to supine  and scoot up and down in bed with modified independence within 7 day(s). 2.  Patient will transfer from bed to chair and chair to bed with modified independence using the least restrictive device within 7 day(s). 3.  Patient will perform sit to stand with modified independence within 7 day(s). 4.  Patient will ambulate with modified independence for > 150 feet with the least restrictive device within 7 day(s). 5.  Patient will ascend/descend 4 stairs with one handrail(s) with supervision within 7 day(s). PHYSICAL THERAPY TREATMENT  Patient: Braden Willis (54 y.o. female)  Date: 12/27/2022  Diagnosis: Hip fracture ([facility] [S72.009A] <principal problem not specified>  Procedure(s) (LRB):  RIGHT BIPOLAR HIP ANTERIOR APPROACH (Right) 5 Days Post-Op  Precautions: WBAT  Chart, physical therapy assessment, plan of care and goals were reviewed. ASSESSMENT  Patient continues with skilled PT services and is progressing towards goals gradually. She is received OOB in chair and agreeable to therapy, but first request for use of BSC. She stood w/ Min A to RW from chair using armrest. She as able to amb minimally across the room to [facility] w/ the RW- CGA. She was able to address velma care while standing w/ RUE support on RW and w/ CGA. Pt  continues to demonstrate limited endurance w/ activity and ready to return to bed. BP taken also and improved compared to AM BP w/ OT (see BP below). Pt returned to bed w/ Min for RLE into bed. All needs in reach, bed alarm activated. PA present- pt to d/c to [facility] this afternoon.       Current Level of Function Impacting Discharge (mobility/balance): CGA/Min Ax1 w/ RW support for mobility. Other factors to consider for discharge: lives alone         PLAN :  Patient continues to benefit from skilled intervention to address the above impairments. Continue treatment per established plan of care. to address goals. Recommendation for discharge: (in order for the patient to meet his/her long term goals)  Therapy 3 hours per day 5-7 days per week    This discharge recommendation:  Has been made in collaboration with the attending provider and/or case management    IF patient discharges home will need the following DME: rolling walker       SUBJECTIVE:   Patient stated I'm getting tired. I need to sit.     OBJECTIVE DATA SUMMARY:   Critical Behavior:  Neurologic State: Alert  Orientation Level: Oriented X4  Cognition: Appropriate decision making, Appropriate safety awareness, Appropriate for age attention/concentration  Safety/Judgement: Awareness of environment, Fall prevention  Functional Mobility Training:  Bed Mobility:  Rolling: Contact guard assistance  Supine to Sit:  (received OOB in chair)  Sit to Supine: Minimum assistance (RLE into bed)  Scooting: Minimum assistance        Transfers:  Sit to Stand: Assist x1;Minimum assistance (pt using armrest)  Stand to Sit: Contact guard assistance;Assist x1        Bed to Chair: Minimum assistance;Assist x1;Additional time                    Balance:  Sitting: Intact  Sitting - Static: Good (unsupported)  Sitting - Dynamic: Fair (occasional)  Standing: Impaired; With support  Standing - Static: Constant support;Good  Standing - Dynamic : Constant support; Fair  Ambulation/Gait Training:  Distance (ft): 5 Feet (ft) (at bedside)  Assistive Device: Gait belt;Walker, rolling  Ambulation - Level of Assistance: Contact guard assistance;Assist x1        Gait Abnormalities: Antalgic;Decreased step clearance; Step to gait  Right Side Weight Bearing: As tolerated  Left Side Weight Bearing: Full  Base of Support: Widened  Stance: Right decreased  Speed/Laura: Slow;Shuffled  Step Length: Right shortened;Left shortened  Swing Pattern: Right asymmetrical         Therapeutic Exercises:   Standing w/ RW support: heel raises x10  Supine: heel slides, quad set glut set x10 each. Pt able to perform heel slide actively w/ CGA. Pain Rating:  3/10    Activity Tolerance:   Fair, noted SOB w/ activity. Vitals:    12/27/22 1226 12/27/22 1235   BP: (!) 139/98 (!) 144/87   BP 1 Location: Right upper arm Right upper arm   BP Patient Position: Sitting  Comment: on BSC after voiding Supine;Semi fowlers; At rest   Pulse: (!) 105 91   Temp:     Resp:     Weight:     SpO2:           After treatment patient left in no apparent distress:   Supine in bed, Call bell within reach, Bed / chair alarm activated, and Side rails x 3    COMMUNICATION/COLLABORATION:   The patients plan of care was discussed with: Registered nurse.      Shayla RehmanPTA   Time Calculation: 32 mins

## 2022-12-29 NOTE — ANESTHESIA POSTPROCEDURE EVALUATION
Post-Anesthesia Evaluation and Assessment    Patient: Kulwinder Self MRN: [id number]  SSN: [id number]    YOB: 1940  Age: 81 y.o. Sex: female      I have evaluated the patient and they are stable and ready for discharge from the PACU. Cardiovascular Function/Vital Signs  Visit Vitals  BP (!) 162/83 (BP 1 Location: Right upper arm, BP Patient Position: At rest;Lying)   Pulse 71   Temp 36.5 °C (97.7 °F)   Resp 15   Wt 76.6 kg (168 lb 12.8 oz)   SpO2 93%   BMI 32.97 kg/m²       Patient is status post General anesthesia for Procedure(s):  RIGHT BIPOLAR HIP ANTERIOR APPROACH. Nausea/Vomiting: None    Postoperative hydration reviewed and adequate. Pain:  Pain Scale 1: Numeric (0 - 10) (12/27/22 1017)  Pain Intensity 1: 5 (12/27/22 1017)   Managed    Neurological Status:   Neuro (WDL): Within Defined Limits (12/22/22 1645)  Neuro  Neurologic State: Alert (12/27/22 1100)  Orientation Level: Oriented X4 (12/27/22 1100)  Cognition: Appropriate decision making; Appropriate safety awareness; Appropriate for age attention/concentration (12/27/22 1100)  Speech: Clear (12/26/22 2000)  LUE Motor Response: Purposeful (12/26/22 2000)  LLE Motor Response: Purposeful (12/26/22 2000)  RUE Motor Response: Purposeful (12/26/22 2000)  RLE Motor Response: Weak (12/26/22 2000)   At baseline    Mental Status, Level of Consciousness: Alert and  oriented to person, place, and time    Pulmonary Status:   O2 Device: None (Room air) (12/27/22 1017)   Adequate oxygenation and airway patent    Complications related to anesthesia: None    Post-anesthesia assessment completed. No concerns    Signed By: Pasha Rosas MD     December 29, 2022              Procedure(s):  RIGHT BIPOLAR HIP ANTERIOR APPROACH.     general    <BSHSIANPOST>    INITIAL Post-op Vital signs:   Vitals Value Taken Time   /51 12/22/22 1715   Temp 36.7 °C (98 °F) 12/22/22 1715   Pulse 78 12/22/22 1715   Resp 15 12/22/22 1715   SpO2 97 % 12/22/22 1715

## 2023-01-19 ENCOUNTER — OFFICE VISIT (OUTPATIENT)
Dept: PRIMARY CARE CLINIC | Age: 83
End: 2023-01-19
Payer: MEDICARE

## 2023-01-19 ENCOUNTER — PATIENT MESSAGE (OUTPATIENT)
Dept: PRIMARY CARE CLINIC | Age: 83
End: 2023-01-19

## 2023-01-19 VITALS
BODY MASS INDEX: 32.24 KG/M2 | DIASTOLIC BLOOD PRESSURE: 80 MMHG | HEART RATE: 56 BPM | RESPIRATION RATE: 16 BRPM | HEIGHT: 60 IN | OXYGEN SATURATION: 95 % | SYSTOLIC BLOOD PRESSURE: 145 MMHG | TEMPERATURE: 97.8 F | WEIGHT: 164.2 LBS

## 2023-01-19 DIAGNOSIS — S72.001S CLOSED FRACTURE OF RIGHT HIP, SEQUELA: ICD-10-CM

## 2023-01-19 DIAGNOSIS — E11.9 CONTROLLED TYPE 2 DIABETES MELLITUS WITHOUT COMPLICATION, WITHOUT LONG-TERM CURRENT USE OF INSULIN (HCC): ICD-10-CM

## 2023-01-19 DIAGNOSIS — E27.8 ADRENAL MASS (HCC): Primary | ICD-10-CM

## 2023-01-19 DIAGNOSIS — I50.9 ACUTE ON CHRONIC CONGESTIVE HEART FAILURE, UNSPECIFIED HEART FAILURE TYPE (HCC): ICD-10-CM

## 2023-01-19 DIAGNOSIS — F33.0 MAJOR DEPRESSIVE DISORDER, RECURRENT, MILD (HCC): ICD-10-CM

## 2023-01-19 DIAGNOSIS — I10 ESSENTIAL HYPERTENSION, MALIGNANT: ICD-10-CM

## 2023-01-19 PROCEDURE — G8400 PT W/DXA NO RESULTS DOC: HCPCS | Performed by: FAMILY MEDICINE

## 2023-01-19 PROCEDURE — 1090F PRES/ABSN URINE INCON ASSESS: CPT | Performed by: FAMILY MEDICINE

## 2023-01-19 PROCEDURE — 1111F DSCHRG MED/CURRENT MED MERGE: CPT | Performed by: FAMILY MEDICINE

## 2023-01-19 PROCEDURE — 1101F PT FALLS ASSESS-DOCD LE1/YR: CPT | Performed by: FAMILY MEDICINE

## 2023-01-19 PROCEDURE — G8427 DOCREV CUR MEDS BY ELIG CLIN: HCPCS | Performed by: FAMILY MEDICINE

## 2023-01-19 PROCEDURE — G8417 CALC BMI ABV UP PARAM F/U: HCPCS | Performed by: FAMILY MEDICINE

## 2023-01-19 PROCEDURE — 3077F SYST BP >= 140 MM HG: CPT | Performed by: FAMILY MEDICINE

## 2023-01-19 PROCEDURE — G8536 NO DOC ELDER MAL SCRN: HCPCS | Performed by: FAMILY MEDICINE

## 2023-01-19 PROCEDURE — G8432 DEP SCR NOT DOC, RNG: HCPCS | Performed by: FAMILY MEDICINE

## 2023-01-19 PROCEDURE — 3079F DIAST BP 80-89 MM HG: CPT | Performed by: FAMILY MEDICINE

## 2023-01-19 PROCEDURE — 1123F ACP DISCUSS/DSCN MKR DOCD: CPT | Performed by: FAMILY MEDICINE

## 2023-01-19 PROCEDURE — 99204 OFFICE O/P NEW MOD 45 MIN: CPT | Performed by: FAMILY MEDICINE

## 2023-01-19 RX ORDER — OXYCODONE HYDROCHLORIDE 5 MG/1
5 TABLET ORAL
COMMUNITY
Start: 2023-01-09

## 2023-01-19 RX ORDER — TRAZODONE HYDROCHLORIDE 100 MG/1
200 TABLET ORAL
Qty: 180 TABLET | Refills: 1 | Status: SHIPPED | OUTPATIENT
Start: 2023-01-19

## 2023-01-19 RX ORDER — DULOXETIN HYDROCHLORIDE 60 MG/1
60 CAPSULE, DELAYED RELEASE ORAL DAILY
Qty: 90 CAPSULE | Refills: 1 | Status: SHIPPED | OUTPATIENT
Start: 2023-01-19

## 2023-01-19 RX ORDER — CARVEDILOL 6.25 MG/1
6.25 TABLET ORAL 2 TIMES DAILY WITH MEALS
Qty: 180 TABLET | Refills: 1 | Status: SHIPPED | OUTPATIENT
Start: 2023-01-19

## 2023-01-19 RX ORDER — METHOCARBAMOL 500 MG/1
TABLET, FILM COATED ORAL
COMMUNITY
Start: 2023-01-09 | End: 2023-01-19 | Stop reason: SDUPTHER

## 2023-01-19 RX ORDER — ATORVASTATIN CALCIUM 40 MG/1
40 TABLET, FILM COATED ORAL DAILY
Qty: 90 TABLET | Refills: 3 | Status: SHIPPED | OUTPATIENT
Start: 2023-01-19

## 2023-01-19 RX ORDER — METHOCARBAMOL 500 MG/1
500 TABLET, FILM COATED ORAL 4 TIMES DAILY
Qty: 270 TABLET | Refills: 1 | Status: SHIPPED | OUTPATIENT
Start: 2023-01-19

## 2023-01-19 NOTE — PROGRESS NOTES
HPI     Chief Complaint   Patient presents with    New Patient     Patient recently went to ED for fall- had a hip replacement     She is a 81 y.o. female who presents to establish care. Pmhx : DM2, CHF, MI and CAD s/p PCI 2017, pulmonary HTN, HTN, HLD, c spine stenosis, depression and anxiety,   Bilat adrenal nodules, has not followed up with endocrine. Admission 12/21/22 -12/27/22  : displaced R femoral neck fracture, surgical repair on 12/22. Lab Results   Component Value Date/Time    Sodium 138 12/24/2022 05:35 AM    Potassium 4.2 12/24/2022 05:35 AM    Chloride 108 12/24/2022 05:35 AM    CO2 27 12/24/2022 05:35 AM    Anion gap 3 (L) 12/24/2022 05:35 AM    Glucose 144 (H) 12/24/2022 05:35 AM    BUN 17 12/24/2022 05:35 AM    Creatinine 1.08 (H) 12/24/2022 05:35 AM    BUN/Creatinine ratio 16 12/24/2022 05:35 AM    GFR est AA >60 08/01/2011 02:54 PM    GFR est non-AA 52 (L) 08/01/2011 02:54 PM    Calcium 8.0 (L) 12/24/2022 05:35 AM     Lab Results   Component Value Date/Time    WBC 8.8 12/23/2022 05:17 AM    HGB 10.0 (L) 12/24/2022 05:35 AM    HCT 33.3 (L) 12/23/2022 05:17 AM    PLATELET 404 20/35/8137 05:17 AM    MCV 88.3 12/23/2022 05:17 AM     CHF, CAD s/p PCI 2017, stable followed by Dr Barb Singh. DM2, last a1c 6.9. has not taken medication for this in years. Since discharge to home she has been overall doing well. Diet is healthy. She lives alone. Her neighbor helps her out quite a bit, [employer]. She is not driving. Moise Araujo helps take her to the grocery store and drives her places. Denies any recent falls. She is doing HH PT. She is ambulating at home without assistance. Uses a walker or a cane when out of the home. Hx chronic anxiety. Has been stable on buspar and cymbalta and wants to continue this. Using trazodone for sleep.      Establishing Care  - Chronic medical problems:  Past Medical History:   Diagnosis Date    Arthritis     Chronic pain     Diabetes ([facility] [address] Essential hypertension, malignant 4/6/2011    Hypertension     MI, old 2017    Pre-operative cardiovascular examination 4/6/2011    Psychiatric disorder     DEPRESSION/ANXIETY    PUD (peptic ulcer disease)     1970S    Pure hypercholesterolemia 4/6/2011    Type II or unspecified type diabetes mellitus without mention of complication, not stated as uncontrolled 4/6/2011     - Current medications:   Current Outpatient Medications   Medication Sig    traZODone (DESYREL) 100 mg tablet Take 2 Tablets by mouth nightly. Take two tablets at bedtime    methocarbamoL (ROBAXIN) 500 mg tablet Take 1 Tablet by mouth four (4) times daily. DULoxetine (CYMBALTA) 60 mg capsule Take 1 Capsule by mouth daily. carvediloL (COREG) 6.25 mg tablet Take 1 Tablet by mouth two (2) times daily (with meals). Indications: high blood pressure    atorvastatin (LIPITOR) 40 mg tablet Take 1 Tablet by mouth daily. aspirin delayed-release 81 mg tablet Take 1 Tablet by mouth two (2) times a day. Take twice a day per the Orthopedic Surgery team. At follow-up with them discuss decreasing back to daily dosing    olmesartan-hydroCHLOROthiazide (BENICAR HCT) 40-12.5 mg per tablet Take 1 Tablet by mouth.    busPIRone (BUSPAR) 10 mg tablet Take 10 mg by mouth three (3) times daily. MULTIVITAMIN PO Take  by mouth daily. VITAMIN A PO Take  by mouth daily. CYANOCOBALAMIN, VITAMIN B-12, (VITAMIN B-12 PO) Take  by mouth daily. ASCORBATE CALCIUM (VITAMIN C PO) Take  by mouth daily. VITAMIN E ACETATE (VITAMIN E PO) Take  by mouth daily. oxyCODONE IR (ROXICODONE) 5 mg immediate release tablet Take 5 mg by mouth every six (6) hours as needed. No current facility-administered medications for this visit.      - Family history:   Family History   Problem Relation Age of Onset    Diabetes Mother     Hypertension Mother     Heart Disease Mother     Hypertension Father     Heart Disease Father     Cancer Father     Diabetes Father Hypertension Sister     Hypertension Brother     Cancer Paternal Grandmother     Cancer Paternal Grandfather     Hypertension Sister     Cancer Sister      - Allergies: Allergies   Allergen Reactions    Actonel [Risedronate] Other (comments)     BREATHING DIFFICULTIES AND EDEMA AND HIVES    Penicillin G Other (comments)     Black out     - Surgical history:   Past Surgical History:   Procedure Laterality Date    HX DILATION AND CURETTAGE      HX HEENT      EARS AS CHILD    HX SALPINGO-OOPHORECTOMY      HX TONSILLECTOMY      [facility] UNLISTED NEUROLOGICAL/NEUROMUSCULAR DX PX      CERV. FUSION    W[facility] UNLISTED PROCEDURE ABDOMEN PERITONEUM & OMENTUM      BOWEL OBSTRUCTION X2    [facility] UNLISTED PROCEDURE ABDOMEN PERITONEUM & OMENTUM      GASTRIC BYPASS    W[facility] UNLISTED PROCEDURE ABDOMEN PERITONEUM & OMENTUM      LAP SEBASTIÁN    W[facility] UNLISTED PROCEDURE ABDOMEN PERITONEUM & OMENTUM      HERNIA      - Social history (sexually active, occupation, smoker, etoh use, etc):   Social History     Socioeconomic History    Marital status:      Spouse name: Not on file    Number of children: Not on file    Years of education: Not on file    Highest education level: Not on file   Occupational History    Not on file   Tobacco Use    Smoking status: Never    Smokeless tobacco: Never   Vaping Use    Vaping Use: Never used   Substance and Sexual Activity    Alcohol use: Yes     Comment: RARE    Drug use: No    Sexual activity: Never   Other Topics Concern    Not on file   Social History Narrative    ** Merged History Encounter **          Social Determinants of Health     Financial Resource Strain: Not on file   Food Insecurity: Not on file   Transportation Needs: Not on file   Physical Activity: Not on file   Stress: Not on file   Social Connections: Not on file   Intimate Partner Violence: Not on file   Housing Stability: Not on file         Review of Systems  General : Denies fever, chills, unintentional weight loss.   Cardiac : Denies chest pain, shortness of breath, orthopnea, PND. Pulm : Denies coughing, hemoptysis, dyspnea. GI: Denies abd pain, vomiting, change in bowel movements, black/bloody stool. Neuro : Denies syncope or presyncope. Denies focal neuro symptoms. Reviewed PmHx, RxHx, FmHx, SocHx, AllgHx and updated and dated in the chart. Physical Exam:  Visit Vitals  BP (!) 145/80 (BP 1 Location: Right arm)   Pulse (!) 56   Temp 97.8 °F (36.6 °C)   Resp 16   Ht 5' (1.524 m)   Wt 164 lb 3.2 oz (74.5 kg)   SpO2 95%   BMI 32.07 kg/m²     Physical Exam  Vitals reviewed. Constitutional:       Appearance: Normal appearance. HENT:      Head: Normocephalic and atraumatic. Cardiovascular:      Rate and Rhythm: Normal rate and regular rhythm. Pulses: Normal pulses. Comments: 2/6 systolic murmur  Pulmonary:      Effort: Pulmonary effort is normal.      Breath sounds: Normal breath sounds. Musculoskeletal:      Right lower leg: No edema. Left lower leg: No edema. Skin:     General: Skin is warm and dry. Neurological:      General: No focal deficit present. Mental Status: She is alert and oriented to person, place, and time. Psychiatric:         Mood and Affect: Mood normal.         Behavior: Behavior normal.         Thought Content: Thought content normal.            Assessment / Plan     Diagnoses and all orders for this visit:    1. Adrenal mass ([address]  -     REFERRAL TO ENDOCRINOLOGY    2. Acute on chronic congestive heart failure, unspecified heart failure type ([address]    3. Controlled type 2 diabetes mellitus without complication, without long-term current use of insulin (HCC)  -     HEMOGLOBIN A1C WITH EAG; Future    4. Closed fracture of right hip, sequela  -     REFERRAL TO HOME HEALTH    5. Major depressive disorder, recurrent, mild    6. Essential hypertension, malignant    Other orders  -     traZODone (DESYREL) 100 mg tablet; Take 2 Tablets by mouth nightly.  Take two tablets at bedtime  - methocarbamoL (ROBAXIN) 500 mg tablet; Take 1 Tablet by mouth four (4) times daily.  -     DULoxetine (CYMBALTA) 60 mg capsule; Take 1 Capsule by mouth daily. -     carvediloL (COREG) 6.25 mg tablet; Take 1 Tablet by mouth two (2) times daily (with meals). Indications: high blood pressure  -     atorvastatin (LIPITOR) 40 mg tablet; Take 1 Tablet by mouth daily. Hospital notes, results reviewed today. Med rec.  PT, wound evaluation. I have discussed the diagnosis with the patient and the intended plan as seen in the above orders. I have discussed medication side effects and warnings with the patient as well.     Norma Miner, DO

## 2023-01-19 NOTE — PROGRESS NOTES
Chief Complaint   Patient presents with    New Patient     Patient recently went to ED for fall- had a hip replacement     Presents in office today with neighbor Ana Tapia     Visit Vitals  BP (!) 145/80 (BP 1 Location: Right arm)   Pulse (!) 56   Temp 97.8 °F (36.6 °C)   Resp 16   Ht 5' (1.524 m)   Wt 164 lb 3.2 oz (74.5 kg)   SpO2 95%   BMI 32.07 kg/m²       1. Have you been to the ER, urgent care clinic since your last visit? Hospitalized since your last visit? Yes [facility] and [facility]    2. Have you seen or consulted any other health care providers outside of the [address] [place] [address] since your last visit? Include any pap smears or colon screening.  No

## 2023-01-24 ENCOUNTER — OFFICE VISIT (OUTPATIENT)
Dept: ORTHOPEDIC SURGERY | Age: 83
End: 2023-01-24
Payer: MEDICARE

## 2023-01-24 VITALS — BODY MASS INDEX: 32.2 KG/M2 | WEIGHT: 164 LBS | HEIGHT: 60 IN

## 2023-01-24 DIAGNOSIS — Z98.890 STATUS POST HIP SURGERY: Primary | ICD-10-CM

## 2023-01-24 PROCEDURE — 99024 POSTOP FOLLOW-UP VISIT: CPT | Performed by: ORTHOPAEDIC SURGERY

## 2023-01-24 NOTE — PROGRESS NOTES
Hunter Elkins (: 1940) is a 81 y.o. female patient, here for evaluation of the following chief complaint(s):  Surgical Follow-up (Right hip follow up/)       ASSESSMENT/PLAN:  Below is the assessment and plan developed based on review of pertinent history, physical exam, labs, studies, and medications. Radiographs reviewed including 2 views of the right hip. Status post Logan hip arthroplasty. No evidence of aseptic loosening. Leg lengths and offset are appropriate. Status post right Logan hip arthroplasty 2022. The patient is doing well and they are happy with progress. Incision well-healed. Minimal to no pain. Ambulating well. Has completed physical therapy. I would like to see them back in 6 weeks. 1. Status post hip surgery  -     XR HIP RT W OR WO PELV 2-3 VWS; Future      Encounter Diagnosis   Name Primary? Status post hip surgery Yes        No follow-ups on file. SUBJECTIVE/OBJECTIVE:  Hunter Elkins (: 1940) is a 81 y.o. female who presents today for the following:  Chief Complaint   Patient presents with    Surgical Follow-up     Right hip follow up         42-year-old female comes in today for follow-up. She status post a right partial hip replacement secondary to fracture on 2022. Postoperatively doing great. She has minimal to no pain. Walking with a cane. Incision well-healed. Completed physical therapy. She is extremely happy and pleased with her progress thus far. IMAGING:  XR Results (most recent):  Results from Appointment encounter on 23    XR HIP RT W OR WO PELV 2-3 VWS    Narrative  Radiographs reviewed including 2 views of the right hip. Status post logan hip arthroplasty. No evidence of aseptic loosening. Leg lengths and offset are appropriate.        Allergies   Allergen Reactions    Actonel [Risedronate] Other (comments)     BREATHING DIFFICULTIES AND EDEMA AND HIVES    Penicillin G Other (comments)     Black out Current Outpatient Medications   Medication Sig    oxyCODONE IR (ROXICODONE) 5 mg immediate release tablet Take 5 mg by mouth every six (6) hours as needed. traZODone (DESYREL) 100 mg tablet Take 2 Tablets by mouth nightly. Take two tablets at bedtime    methocarbamoL (ROBAXIN) 500 mg tablet Take 1 Tablet by mouth four (4) times daily. DULoxetine (CYMBALTA) 60 mg capsule Take 1 Capsule by mouth daily. carvediloL (COREG) 6.25 mg tablet Take 1 Tablet by mouth two (2) times daily (with meals). Indications: high blood pressure    atorvastatin (LIPITOR) 40 mg tablet Take 1 Tablet by mouth daily. aspirin delayed-release 81 mg tablet Take 1 Tablet by mouth two (2) times a day. Take twice a day per the Orthopedic Surgery team. At follow-up with them discuss decreasing back to daily dosing    olmesartan-hydroCHLOROthiazide (BENICAR HCT) 40-12.5 mg per tablet Take 1 Tablet by mouth.    busPIRone (BUSPAR) 10 mg tablet Take 10 mg by mouth three (3) times daily. MULTIVITAMIN PO Take  by mouth daily. VITAMIN A PO Take  by mouth daily. CYANOCOBALAMIN, VITAMIN B-12, (VITAMIN B-12 PO) Take  by mouth daily. ASCORBATE CALCIUM (VITAMIN C PO) Take  by mouth daily. VITAMIN E ACETATE (VITAMIN E PO) Take  by mouth daily. No current facility-administered medications for this visit.        Past Medical History:   Diagnosis Date    Arthritis     Chronic pain     Diabetes ([address]     Essential hypertension, malignant 4/6/2011    Hypertension     MI, old 2017    Pre-operative cardiovascular examination 4/6/2011    Psychiatric disorder     DEPRESSION/ANXIETY    PUD (peptic ulcer disease)     1970S    Pure hypercholesterolemia 4/6/2011    Type II or unspecified type diabetes mellitus without mention of complication, not stated as uncontrolled 4/6/2011        Past Surgical History:   Procedure Laterality Date    HX DILATION AND CURETTAGE      HX HEENT      EARS AS CHILD    HX SALPINGO-OOPHORECTOMY      HX TONSILLECTOMY      [facility] UNLISTED NEUROLOGICAL/NEUROMUSCULAR DX PX      CERV. FUSION    VA UNLISTED PROCEDURE ABDOMEN PERITONEUM & OMENTUM      BOWEL OBSTRUCTION X2    VA UNLISTED PROCEDURE ABDOMEN PERITONEUM & OMENTUM      GASTRIC BYPASS    VA UNLISTED PROCEDURE ABDOMEN PERITONEUM & OMENTUM      LAP SEBASTIÁN    [facility] UNLISTED PROCEDURE ABDOMEN PERITONEUM & OMENTUM      HERNIA        Family History   Problem Relation Age of Onset    Diabetes Mother     Hypertension Mother     Heart Disease Mother     Hypertension Father     Heart Disease Father     Cancer Father     Diabetes Father     Hypertension Sister     Hypertension Brother     Cancer Paternal Grandmother     Cancer Paternal Grandfather     Hypertension Sister     Cancer Sister         Social History     Tobacco Use    Smoking status: Never    Smokeless tobacco: Never   Substance Use Topics    Alcohol use: Yes     Comment: RARE        All systems reviewed x 12 and were negative with the exception of None      No flowsheet data found. Vitals:  Ht 5' (1.524 m)   Wt 164 lb (74.4 kg)   BMI 32.03 kg/m²    Body mass index is 32.03 kg/m². Physical Exam    General: NAD    Cardiac: Extremities well perfused. Respiratory: Nonlabored breathing. RLE: Incision clean dry and intact with no erythema. Minimal numbness over the LFCN. Normal gait and station. Negative stinchfield. No pain with gentle internal and external rotation. .  Motor strength is grossly intact. Skin warm well perfused. Capillary refill <2 sec. mAara Mayer M.D. was available for immediate consultation as the supervising physician. An electronic signature was used to authenticate this note.   -- Joe Davies PA-C

## 2023-02-13 ENCOUNTER — OFFICE VISIT (OUTPATIENT)
Dept: PRIMARY CARE CLINIC | Age: 83
End: 2023-02-13
Payer: MEDICARE

## 2023-02-13 ENCOUNTER — HOSPITAL ENCOUNTER (OUTPATIENT)
Dept: GENERAL RADIOLOGY | Age: 83
Discharge: HOME | End: 2023-02-13
Payer: MEDICARE

## 2023-02-13 VITALS
RESPIRATION RATE: 16 BRPM | DIASTOLIC BLOOD PRESSURE: 88 MMHG | BODY MASS INDEX: 32.12 KG/M2 | OXYGEN SATURATION: 96 % | SYSTOLIC BLOOD PRESSURE: 163 MMHG | TEMPERATURE: 98.7 F | HEART RATE: 64 BPM | WEIGHT: 163.6 LBS | HEIGHT: 60 IN

## 2023-02-13 DIAGNOSIS — M25.561 ACUTE PAIN OF RIGHT KNEE: ICD-10-CM

## 2023-02-13 DIAGNOSIS — M25.561 ACUTE PAIN OF RIGHT KNEE: Primary | ICD-10-CM

## 2023-02-13 PROCEDURE — 99213 OFFICE O/P EST LOW 20 MIN: CPT | Performed by: FAMILY MEDICINE

## 2023-02-13 PROCEDURE — 1090F PRES/ABSN URINE INCON ASSESS: CPT | Performed by: FAMILY MEDICINE

## 2023-02-13 PROCEDURE — G9717 DOC PT DX DEP/BP F/U NT REQ: HCPCS | Performed by: FAMILY MEDICINE

## 2023-02-13 PROCEDURE — 73560 X-RAY EXAM OF KNEE 1 OR 2: CPT

## 2023-02-13 PROCEDURE — 3077F SYST BP >= 140 MM HG: CPT | Performed by: FAMILY MEDICINE

## 2023-02-13 PROCEDURE — 1123F ACP DISCUSS/DSCN MKR DOCD: CPT | Performed by: FAMILY MEDICINE

## 2023-02-13 PROCEDURE — G8427 DOCREV CUR MEDS BY ELIG CLIN: HCPCS | Performed by: FAMILY MEDICINE

## 2023-02-13 PROCEDURE — 3079F DIAST BP 80-89 MM HG: CPT | Performed by: FAMILY MEDICINE

## 2023-02-13 PROCEDURE — 1101F PT FALLS ASSESS-DOCD LE1/YR: CPT | Performed by: FAMILY MEDICINE

## 2023-02-13 PROCEDURE — G8400 PT W/DXA NO RESULTS DOC: HCPCS | Performed by: FAMILY MEDICINE

## 2023-02-13 PROCEDURE — G8536 NO DOC ELDER MAL SCRN: HCPCS | Performed by: FAMILY MEDICINE

## 2023-02-13 PROCEDURE — G8417 CALC BMI ABV UP PARAM F/U: HCPCS | Performed by: FAMILY MEDICINE

## 2023-02-13 RX ORDER — CHOLECALCIFEROL (VITAMIN D3) 125 MCG
CAPSULE ORAL
COMMUNITY

## 2023-02-13 NOTE — PROGRESS NOTES
Burgess Chan is an 81 y.o. female who presents for Pmhx : DM2, CHF, MI and CAD s/p PCI 2017, pulmonary HTN, HTN, HLD, c spine stenosis, depression and anxiety,   Bilat adrenal nodules, has not followed up with endocrine. C/o right knee pain, onset after a fall injury in December. Not improved. Feels unstable. No fall injury since December. Intermittent swelling. Allergies - reviewed: Allergies   Allergen Reactions    Actonel [Risedronate] Other (comments)     BREATHING DIFFICULTIES AND EDEMA AND HIVES    Penicillin G Other (comments)     Black out         Medications - reviewed:   Current Outpatient Medications   Medication Sig    cholecalciferol, vitamin D3, (Vitamin D3) 50 mcg (2,000 unit) tab Take  by mouth. traZODone (DESYREL) 100 mg tablet Take 2 Tablets by mouth nightly. Take two tablets at bedtime    methocarbamoL (ROBAXIN) 500 mg tablet Take 1 Tablet by mouth four (4) times daily. DULoxetine (CYMBALTA) 60 mg capsule Take 1 Capsule by mouth daily. carvediloL (COREG) 6.25 mg tablet Take 1 Tablet by mouth two (2) times daily (with meals). Indications: high blood pressure    atorvastatin (LIPITOR) 40 mg tablet Take 1 Tablet by mouth daily. aspirin delayed-release 81 mg tablet Take 1 Tablet by mouth two (2) times a day. Take twice a day per the Orthopedic Surgery team. At follow-up with them discuss decreasing back to daily dosing    busPIRone (BUSPAR) 10 mg tablet Take 10 mg by mouth three (3) times daily. MULTIVITAMIN PO Take  by mouth daily. VITAMIN A PO Take  by mouth daily. CYANOCOBALAMIN, VITAMIN B-12, (VITAMIN B-12 PO) Take  by mouth daily. ASCORBATE CALCIUM (VITAMIN C PO) Take  by mouth daily. VITAMIN E ACETATE (VITAMIN E PO) Take  by mouth daily. No current facility-administered medications for this visit.          Past Medical History - reviewed:  Past Medical History:   Diagnosis Date    Arthritis     Chronic pain     Diabetes ([facility] [address]     Essential hypertension, malignant 4/6/2011    Hypertension     MI, old 2017    Pre-operative cardiovascular examination 4/6/2011    Psychiatric disorder     DEPRESSION/ANXIETY    PUD (peptic ulcer disease)     1970S    Pure hypercholesterolemia 4/6/2011    Type II or unspecified type diabetes mellitus without mention of complication, not stated as uncontrolled 4/6/2011         Past Surgical History - reviewed:   Past Surgical History:   Procedure Laterality Date    HX DILATION AND CURETTAGE      HX HEENT      EARS AS CHILD    HX SALPINGO-OOPHORECTOMY      HX TONSILLECTOMY      [facility] UNLISTED NEUROLOGICAL/NEUROMUSCULAR DX PX      CERV.  FUSION    [facility] UNLISTED PROCEDURE ABDOMEN PERITONEUM & OMENTUM      BOWEL OBSTRUCTION X2    [facility] UNLISTED PROCEDURE ABDOMEN PERITONEUM & OMENTUM      GASTRIC BYPASS    [facility] UNLISTED PROCEDURE ABDOMEN PERITONEUM & OMENTUM      LAP SEBASTIÁN    [facility] UNLISTED PROCEDURE ABDOMEN PERITONEUM & OMENTUM      HERNIA          Social History - reviewed:  Social History     Socioeconomic History    Marital status:      Spouse name: Not on file    Number of children: Not on file    Years of education: Not on file    Highest education level: Not on file   Occupational History    Not on file   Tobacco Use    Smoking status: Never    Smokeless tobacco: Never   Vaping Use    Vaping Use: Never used   Substance and Sexual Activity    Alcohol use: Yes     Comment: RARE    Drug use: No    Sexual activity: Never   Other Topics Concern    Not on file   Social History Narrative    ** Merged History Encounter **          Social Determinants of Health     Financial Resource Strain: Not on file   Food Insecurity: Not on file   Transportation Needs: Not on file   Physical Activity: Not on file   Stress: Not on file   Social Connections: Not on file   Intimate Partner Violence: Not on file   Housing Stability: Not on file         Family History - reviewed:  Family History   Problem Relation Age of Onset    Diabetes Mother Hypertension Mother     Heart Disease Mother     Hypertension Father     Heart Disease Father     Cancer Father     Diabetes Father     Hypertension Sister     Hypertension Brother     Cancer Paternal Grandmother     Cancer Paternal Grandfather     Hypertension Sister     Cancer Sister            ROS  CONSTITUTIONAL: Denies fever, chills, unintentional weight loss. CARDIOVASCULAR: Denies chest pain, orthopnea, PND. RESPIRATORY: Denies cough, dyspnea, wheezing, hemoptysis. GI: Denies abdominal pain, diarrhea, constipation, diarrhea, black or bloody stool. Physical Exam  Visit Vitals  BP (!) 163/88 (BP 1 Location: Left upper arm, BP Patient Position: Standing, BP Cuff Size: Small adult)   Pulse 64   Temp 98.7 °F (37.1 °C) (Temporal)   Resp 16   Ht 5' (1.524 m)   Wt 163 lb 9.6 oz (74.2 kg)   SpO2 96%   BMI 31.95 kg/m²     Physical Exam  Vitals reviewed. Constitutional:       Appearance: Normal appearance. HENT:      Head: Normocephalic and atraumatic. Cardiovascular:      Rate and Rhythm: Normal rate and regular rhythm. Pulses: Normal pulses. Heart sounds: Normal heart sounds. Pulmonary:      Effort: Pulmonary effort is normal.      Breath sounds: Normal breath sounds. Musculoskeletal:      Right lower leg: No edema. Left lower leg: No edema. Comments: Right knee exam somewhat limited due to guarding on exam. Bilat LE sensation to light touch intact. No appreciated knee joint laxity. Right medial knee joint tenderness with mild effusion. Skin:     General: Skin is warm and dry. Neurological:      Mental Status: She is alert. Assessment/Plan    ICD-10-CM ICD-9-CM    1. Acute pain of right knee  M25.561 719.46 XR KNEE RT MAX 2 VWS      Applied Ice and topical diclofenac TID. Follow up for further eval and treatment. I have discussed the diagnosis with the patient and the intended plan as seen in the above orders.  Patient verbalized understanding of the plan and agrees with the plan. I have discussed medication side effects and warnings with the patient as well. Informed patient to return to the office if new symptoms arise.         Darren Flores, DO

## 2023-02-13 NOTE — PROGRESS NOTES
Chief Complaint   Patient presents with    Knee Pain     right    Fall     This morning      Visit Vitals  BP (!) 163/88 (BP 1 Location: Left upper arm, BP Patient Position: Standing, BP Cuff Size: Small adult)   Pulse 64   Temp 98.7 °F (37.1 °C) (Temporal)   Resp 16   Ht 5' (1.524 m)   Wt 163 lb 9.6 oz (74.2 kg)   SpO2 96%   BMI 31.95 kg/m²

## 2023-02-23 ENCOUNTER — OFFICE VISIT (OUTPATIENT)
Dept: ORTHOPEDIC SURGERY | Age: 83
End: 2023-02-23
Payer: MEDICARE

## 2023-02-23 VITALS — BODY MASS INDEX: 32 KG/M2 | WEIGHT: 163 LBS | HEIGHT: 60 IN

## 2023-02-23 DIAGNOSIS — M17.11 ARTHRITIS OF RIGHT KNEE: Primary | ICD-10-CM

## 2023-02-23 DIAGNOSIS — Z98.890 STATUS POST HIP SURGERY: ICD-10-CM

## 2023-02-23 RX ORDER — METHYLPREDNISOLONE ACETATE 40 MG/ML
40 INJECTION, SUSPENSION INTRA-ARTICULAR; INTRALESIONAL; INTRAMUSCULAR; SOFT TISSUE ONCE
Status: COMPLETED | OUTPATIENT
Start: 2023-02-23 | End: 2023-02-23

## 2023-02-23 RX ADMIN — METHYLPREDNISOLONE ACETATE 40 MG: 40 INJECTION, SUSPENSION INTRA-ARTICULAR; INTRALESIONAL; INTRAMUSCULAR; SOFT TISSUE at 14:59

## 2023-02-23 NOTE — PROGRESS NOTES
Alexsander Minor (: 1940) is a 81 y.o. female patient, here for evaluation of the following chief complaint(s):  Surgical Follow-up (Right hip pain/)       ASSESSMENT/PLAN:  Below is the assessment and plan developed based on review of pertinent history, physical exam, labs, studies, and medications. 81-year-old female comes in today for follow-up. She status post a right Logan hip replacement secondary to fracture. Postoperatively she is doing very well from this. Minimal pain. Occasional discomfort with rolling in bed but other than that doing well. Incision well-healed. Main complaint today is her right knee. X-rays independently reviewed from outside facility. X-rays show moderate tricompartmental knee osteoarthritic changes including joint space narrowing and small osteophyte formation. Symptoms consistent with acute exacerbation of osteoarthritis. Discussed treatment options with patient. She would like to try steroid injection. After verbal consent was obtained I injected  3mL Lidocaine and 40 mg DepoMedrol into the right knee joint using sterile technique. Patient tolerated well. Encouraged activity modification ice rest as needed. Plans to follow-up as needed. 1. Arthritis of right knee  -     DRAIN/INJECT LARGE JOINT/BURSA  -     methylPREDNISolone acetate (DEPO-MEDROL) 40 mg/mL injection 40 mg; 40 mg, Intra artICUlar, ONCE, 1 dose, On Thu 23 at 1500  2. Status post hip surgery      Encounter Diagnoses   Name Primary? Status post hip surgery     Arthritis of right knee Yes        No follow-ups on file. SUBJECTIVE/OBJECTIVE:  Alexsander Minor (: 1940) is a 81 y.o. female who presents today for the following:  Chief Complaint   Patient presents with    Surgical Follow-up     Right hip pain         81-year-old female comes in today for follow-up in regards to her right hip. She status post a Logan hip arthroplasty secondary to fracture.   Postoperatively doing well. Still walking with her cane. Otherwise she has no complaints of pain during the day. She has mild discomfort with moving or rolling at night but slowly improving. Her main complaint today is her right knee. Rates the pain as severe. She has severe stiffness in the morning. Moderate pain with fully straightening going up down stairs and standing upright. IMAGING:  XR Results (most recent):  Results from Hospital Encounter encounter on 02/13/23    XR KNEE RT MAX 2 VWS    Narrative  EXAM: XR KNEE RT MAX 2 VWS    INDICATION: Pain. COMPARISON: 12/21/2022 and 2019. FINDINGS: Two views of the right knee demonstrate no fracture. There is diffuse  osteopenia with a stable popcorn-like 21 mm lesion in the anterior distal  femoral cortex. Small joint effusion is seen. There is thickening and  indistinctness of the distal quadriceps and distal patellar tendons. 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 traZODone (DESYREL) 100 mg tablet Take 2 Tablets by mouth nightly. Take two tablets at bedtime    methocarbamoL (ROBAXIN) 500 mg tablet Take 1 Tablet by mouth four (4) times daily. DULoxetine (CYMBALTA) 60 mg capsule Take 1 Capsule by mouth daily. carvediloL (COREG) 6.25 mg tablet Take 1 Tablet by mouth two (2) times daily (with meals). Indications: high blood pressure    atorvastatin (LIPITOR) 40 mg tablet Take 1 Tablet by mouth daily. aspirin delayed-release 81 mg tablet Take 1 Tablet by mouth two (2) times a day.  Take twice a day per the Orthopedic Surgery team. At follow-up with them discuss decreasing back to daily dosing    busPIRone (BUSPAR) 10 mg tablet Take 10 mg by mouth three (3) times daily. MULTIVITAMIN PO Take  by mouth daily. VITAMIN A PO Take  by mouth daily. CYANOCOBALAMIN, VITAMIN B-12, (VITAMIN B-12 PO) Take  by mouth daily. ASCORBATE CALCIUM (VITAMIN C PO) Take  by mouth daily. VITAMIN E ACETATE (VITAMIN E PO) Take  by mouth daily. Current Facility-Administered Medications   Medication    methylPREDNISolone acetate (DEPO-MEDROL) 40 mg/mL injection 40 mg       Past Medical History:   Diagnosis Date    Arthritis     Chronic pain     Diabetes ([facility] [address]     Essential hypertension, malignant 4/6/2011    Hypertension     MI, old 2017    Pre-operative cardiovascular examination 4/6/2011    Psychiatric disorder     DEPRESSION/ANXIETY    PUD (peptic ulcer disease)     1970S    Pure hypercholesterolemia 4/6/2011    Type II or unspecified type diabetes mellitus without mention of complication, not stated as uncontrolled 4/6/2011        Past Surgical History:   Procedure Laterality Date    HX DILATION AND CURETTAGE      HX HEENT      EARS AS CHILD    HX SALPINGO-OOPHORECTOMY      HX TONSILLECTOMY      IA UNLISTED NEUROLOGICAL/NEUROMUSCULAR DX PX      CERV.  FUSION    IA UNLISTED PROCEDURE ABDOMEN PERITONEUM & OMENTUM      BOWEL OBSTRUCTION X2    IA UNLISTED PROCEDURE ABDOMEN PERITONEUM & OMENTUM      GASTRIC BYPASS    IA UNLISTED PROCEDURE ABDOMEN PERITONEUM & OMENTUM      LAP SEBASTIÁN    IA UNLISTED PROCEDURE ABDOMEN PERITONEUM & OMENTUM      HERNIA        Family History   Problem Relation Age of Onset    Diabetes Mother     Hypertension Mother     Heart Disease Mother     Hypertension Father     Heart Disease Father     Cancer Father     Diabetes Father     Hypertension Sister     Hypertension Brother     Cancer Paternal Grandmother     Cancer Paternal Grandfather     Hypertension Sister     Cancer Sister         Social History     Tobacco Use    Smoking status: Never    Smokeless tobacco: Never   Substance Use Topics    Alcohol use: Yes     Comment: RARE        All systems reviewed x 12 and were negative with the exception of None      No flowsheet data found. Vitals:  Ht 5' (1.524 m)   Wt 163 lb (73.9 kg)   BMI 31.83 kg/m²    Body mass index is 31.83 kg/m². Physical Exam    General: NAD    Cardiac: Extremities well perfused. Respiratory: Nonlabored breathing. RLE: Incision clean dry and intact with no erythema. Minimal numbness over the LFCN. Normal gait and station. Negative stinchfield. No pain with gentle internal and external rotation. .  Motor strength is grossly intact. Skin warm well perfused. Capillary refill <2 sec. Jeanie Cortez M.D. was available for immediate consultation as the supervising physician. An electronic signature was used to authenticate this note.   -- Orlando Shankar PA-C

## 2023-07-10 ENCOUNTER — OFFICE VISIT (OUTPATIENT)
Dept: PRIMARY CARE CLINIC | Facility: CLINIC | Age: 83
End: 2023-07-10
Payer: MEDICARE

## 2023-07-10 VITALS
HEART RATE: 55 BPM | SYSTOLIC BLOOD PRESSURE: 166 MMHG | WEIGHT: 165 LBS | TEMPERATURE: 97.1 F | HEIGHT: 60 IN | RESPIRATION RATE: 16 BRPM | DIASTOLIC BLOOD PRESSURE: 91 MMHG | OXYGEN SATURATION: 97 % | BODY MASS INDEX: 32.39 KG/M2

## 2023-07-10 DIAGNOSIS — I10 ESSENTIAL (PRIMARY) HYPERTENSION: ICD-10-CM

## 2023-07-10 DIAGNOSIS — Z86.2 HISTORY OF ANEMIA: ICD-10-CM

## 2023-07-10 DIAGNOSIS — I50.22 CHRONIC SYSTOLIC (CONGESTIVE) HEART FAILURE (HCC): ICD-10-CM

## 2023-07-10 DIAGNOSIS — E11.9 TYPE 2 DIABETES MELLITUS WITHOUT COMPLICATION, WITHOUT LONG-TERM CURRENT USE OF INSULIN (HCC): ICD-10-CM

## 2023-07-10 DIAGNOSIS — L98.9 SKIN LESION: Primary | ICD-10-CM

## 2023-07-10 DIAGNOSIS — F33.0 MAJOR DEPRESSIVE DISORDER, RECURRENT, MILD (HCC): ICD-10-CM

## 2023-07-10 PROCEDURE — G8427 DOCREV CUR MEDS BY ELIG CLIN: HCPCS | Performed by: FAMILY MEDICINE

## 2023-07-10 PROCEDURE — 1090F PRES/ABSN URINE INCON ASSESS: CPT | Performed by: FAMILY MEDICINE

## 2023-07-10 PROCEDURE — G8417 CALC BMI ABV UP PARAM F/U: HCPCS | Performed by: FAMILY MEDICINE

## 2023-07-10 PROCEDURE — 4004F PT TOBACCO SCREEN RCVD TLK: CPT | Performed by: FAMILY MEDICINE

## 2023-07-10 PROCEDURE — G8400 PT W/DXA NO RESULTS DOC: HCPCS | Performed by: FAMILY MEDICINE

## 2023-07-10 PROCEDURE — 1123F ACP DISCUSS/DSCN MKR DOCD: CPT | Performed by: FAMILY MEDICINE

## 2023-07-10 PROCEDURE — 3079F DIAST BP 80-89 MM HG: CPT | Performed by: FAMILY MEDICINE

## 2023-07-10 PROCEDURE — 99214 OFFICE O/P EST MOD 30 MIN: CPT | Performed by: FAMILY MEDICINE

## 2023-07-10 PROCEDURE — 3077F SYST BP >= 140 MM HG: CPT | Performed by: FAMILY MEDICINE

## 2023-07-10 RX ORDER — CARVEDILOL 12.5 MG/1
12.5 TABLET ORAL 2 TIMES DAILY WITH MEALS
Qty: 60 TABLET | Refills: 5 | Status: SHIPPED | OUTPATIENT
Start: 2023-07-10

## 2023-07-10 RX ORDER — ASCORBIC ACID 500 MG
500 TABLET ORAL DAILY
COMMUNITY

## 2023-07-10 NOTE — PROGRESS NOTES
Chief Complaint   Patient presents with    Diabetes    Other     Dental procedure coming up and needs antibiotics due to hip surgery in 12/22       There were no vitals taken for this visit. 1. Have you been to the ER, urgent care clinic since your last visit? Hospitalized since your last visit?no    2. Have you seen or consulted any other health care providers outside of the [address] [place] [address] since your last visit? Include any pap smears or colon screening. no      3. For patients aged 43-73: Has the patient had a colonoscopy / FIT/ Cologuard? no      If the patient is female:    4. For patients aged 43-66: Has the patient had a mammogram within the past 2 years?  no

## 2023-07-10 NOTE — PROGRESS NOTES
Subjective  Michelle Melendez is an 81 y.o. female who presents for follow up. DM2, CHF, MI and CAD s/p PCI 2017, pulmonary HTN, HTN, HLD, c spine stenosis, depression and anxiety,   Bilat adrenal nodules, has not followed up with endocrine. CHF, CAD s/p PCI 2017, stable followed by Dr Edward Kanner. DM2, last a1c 6.9. has not taken medication for this in years. Reports she is UTD on eye exams. Hx chronic anxiety. Has been stable on buspar and cymbalta and wants to continue this. Using trazodone for sleep. Lately her mood has been down. She gets depressed about being alone, since the death of her  last year. She has good friend relationships. She denies thoughts of self harm. Admits to lack of exercise and unhealthy diet. Allergies - reviewed: Allergies   Allergen Reactions    Penicillin G Other (See Comments)     Black out    Risedronate Other (See Comments)     BREATHING DIFFICULTIES AND EDEMA AND HIVES         Medications - reviewed:   Current Outpatient Medications   Medication Sig    Multiple Vitamin (MULTIVITAMIN PO) Take by mouth daily    VITAMIN A PO Take by mouth daily    aspirin 81 MG EC tablet Take 81 mg by mouth 2 times daily    atorvastatin (LIPITOR) 40 MG tablet Take 40 mg by mouth daily    busPIRone (BUSPAR) 10 MG tablet Take 10 mg by mouth 3 times daily    carvedilol (COREG) 6.25 MG tablet Take 6.25 mg by mouth 2 times daily (with meals)    Cholecalciferol 50 MCG (2000 UT) TABS Take by mouth    DULoxetine (CYMBALTA) 60 MG extended release capsule Take 60 mg by mouth daily    methocarbamol (ROBAXIN) 500 MG tablet Take 500 mg by mouth 4 times daily    traZODone (DESYREL) 100 MG tablet Take 200 mg by mouth     No current facility-administered medications for this visit.          Past Medical History - reviewed:  Past Medical History:   Diagnosis Date    Arthritis     Chronic pain     Diabetes ([address])     Essential hypertension, malignant 4/6/2011    Hypertension     MI, old

## 2023-07-11 LAB
ALBUMIN SERPL-MCNC: 3.9 G/DL (ref 3.5–5)
ALBUMIN/GLOB SERPL: 1.2 (ref 1.1–2.2)
ALP SERPL-CCNC: 97 U/L (ref 45–117)
ALT SERPL-CCNC: 49 U/L (ref 12–78)
ANION GAP SERPL CALC-SCNC: 4 MMOL/L (ref 5–15)
AST SERPL-CCNC: 55 U/L (ref 15–37)
BILIRUB SERPL-MCNC: 0.3 MG/DL (ref 0.2–1)
BUN SERPL-MCNC: 14 MG/DL (ref 6–20)
BUN/CREAT SERPL: 14 (ref 12–20)
CALCIUM SERPL-MCNC: 9.2 MG/DL (ref 8.5–10.1)
CHLORIDE SERPL-SCNC: 107 MMOL/L (ref 97–108)
CO2 SERPL-SCNC: 29 MMOL/L (ref 21–32)
CREAT SERPL-MCNC: 1.01 MG/DL (ref 0.55–1.02)
CREAT UR-MCNC: 115 MG/DL
ERYTHROCYTE [DISTWIDTH] IN BLOOD BY AUTOMATED COUNT: 20.8 % (ref 11.5–14.5)
EST. AVERAGE GLUCOSE BLD GHB EST-MCNC: 137 MG/DL
GLOBULIN SER CALC-MCNC: 3.2 G/DL (ref 2–4)
GLUCOSE SERPL-MCNC: 115 MG/DL (ref 65–100)
HBA1C MFR BLD: 6.4 % (ref 4–5.6)
HCT VFR BLD AUTO: 32.5 % (ref 35–47)
HGB BLD-MCNC: 11.5 G/DL (ref 11.5–16)
MCH RBC QN AUTO: 30.3 PG (ref 26–34)
MCHC RBC AUTO-ENTMCNC: 35.4 G/DL (ref 30–36.5)
MCV RBC AUTO: 85.5 FL (ref 80–99)
MICROALBUMIN UR-MCNC: 5.32 MG/DL
MICROALBUMIN/CREAT UR-RTO: 46 MG/G (ref 0–30)
NRBC # BLD: 0 K/UL (ref 0–0.01)
NRBC BLD-RTO: 0 PER 100 WBC
PLATELET # BLD AUTO: 397 K/UL (ref 150–400)
PMV BLD AUTO: 10 FL (ref 8.9–12.9)
POTASSIUM SERPL-SCNC: 4.3 MMOL/L (ref 3.5–5.1)
PROT SERPL-MCNC: 7.1 G/DL (ref 6.4–8.2)
RBC # BLD AUTO: 3.8 M/UL (ref 3.8–5.2)
SODIUM SERPL-SCNC: 140 MMOL/L (ref 136–145)
VIT B12 SERPL-MCNC: 281 PG/ML (ref 193–986)
WBC # BLD AUTO: 7.6 K/UL (ref 3.6–11)

## 2023-07-20 ENCOUNTER — HOSPITAL ENCOUNTER (OUTPATIENT)
Age: 83
Discharge: HOME | End: 2023-07-20
Payer: MEDICARE

## 2023-07-20 DIAGNOSIS — M81.0 AGE-RELATED OSTEOPOROSIS WITHOUT CURRENT PATHOLOGICAL FRACTURE: ICD-10-CM

## 2023-07-20 PROCEDURE — 77080 DXA BONE DENSITY AXIAL: CPT

## 2023-08-14 ENCOUNTER — TELEPHONE (OUTPATIENT)
Dept: PRIMARY CARE CLINIC | Facility: CLINIC | Age: 83
End: 2023-08-14

## 2023-08-14 RX ORDER — METHOCARBAMOL 500 MG/1
500 TABLET, FILM COATED ORAL 4 TIMES DAILY PRN
Qty: 120 TABLET | Refills: 3 | Status: SHIPPED | OUTPATIENT
Start: 2023-08-14

## 2023-08-14 RX ORDER — DULOXETIN HYDROCHLORIDE 60 MG/1
60 CAPSULE, DELAYED RELEASE ORAL DAILY
Qty: 90 CAPSULE | Refills: 1 | Status: SHIPPED | OUTPATIENT
Start: 2023-08-14

## 2023-08-14 NOTE — TELEPHONE ENCOUNTER
Duloxetine 60 mg  Methocarbamol 500 mg  Prednisone 20 mg  Valacyclovir (patient spelled it \"Jalacyclovir\" and was not sure of the dose)    Aury olivera [employer] [facility]

## 2023-10-13 ENCOUNTER — NURSE TRIAGE (OUTPATIENT)
Dept: OTHER | Facility: CLINIC | Age: 83
End: 2023-10-13

## 2023-10-13 NOTE — TELEPHONE ENCOUNTER
Location of patient: [address] call from Tim Turner  at [facility] with [employer]. Subjective: Caller states fell 1 week ago, tripped, hit concrete face forward, could not get up, neighbor helped her up was on the concrete 30 minutes     Current Symptoms: H/A staying same in past week  H/A above left eye since falling and hitting head     Onset: 1 week ago     Associated Symptoms: NA    Pain Severity: 8/10 headache above left eye     Temperature: n/a    What has been tried: Tylenol     LMP: NA Pregnant: No    Recommended disposition: See in Office Today or Tomorrow  Pt. Refuses to be seen today or tomorrow, wants appt for Monday, Advised delaying treatment may worsen outcome and lead to complications. Care advice provided, patient verbalizes understanding; denies any other questions or concerns; instructed to call back for any new or worsening symptoms. Writer provided warm transfer to Alexandra Villafuerte at [facility] for appt scheduling    Attention Provider: Thank you for allowing me to participate in the care of your patient. The patient was connected to triage in response to information provided to the ECC/PSC. Please do not respond through this encounter as the response is not directed to a shared pool.     Reason for Disposition   After 3 days and headache persists    Protocols used: Head Injury-ADULT-[place]

## 2023-10-17 ENCOUNTER — OFFICE VISIT (OUTPATIENT)
Dept: PRIMARY CARE CLINIC | Facility: CLINIC | Age: 83
End: 2023-10-17
Payer: MEDICARE

## 2023-10-17 ENCOUNTER — HOSPITAL ENCOUNTER (OUTPATIENT)
Facility: HOSPITAL | Age: 83
Discharge: HOME | End: 2023-10-20
Attending: FAMILY MEDICINE
Payer: MEDICARE

## 2023-10-17 VITALS
DIASTOLIC BLOOD PRESSURE: 76 MMHG | SYSTOLIC BLOOD PRESSURE: 182 MMHG | HEIGHT: 60 IN | WEIGHT: 170.4 LBS | HEART RATE: 64 BPM | RESPIRATION RATE: 16 BRPM | OXYGEN SATURATION: 97 % | BODY MASS INDEX: 33.45 KG/M2 | TEMPERATURE: 97.5 F

## 2023-10-17 DIAGNOSIS — G44.311 INTRACTABLE ACUTE POST-TRAUMATIC HEADACHE: ICD-10-CM

## 2023-10-17 DIAGNOSIS — G44.311 INTRACTABLE ACUTE POST-TRAUMATIC HEADACHE: Primary | ICD-10-CM

## 2023-10-17 DIAGNOSIS — Z91.81 AT HIGH RISK FOR FALLS: ICD-10-CM

## 2023-10-17 DIAGNOSIS — E11.9 TYPE 2 DIABETES MELLITUS WITHOUT COMPLICATION, WITHOUT LONG-TERM CURRENT USE OF INSULIN (HCC): ICD-10-CM

## 2023-10-17 DIAGNOSIS — F33.0 MAJOR DEPRESSIVE DISORDER, RECURRENT, MILD (HCC): ICD-10-CM

## 2023-10-17 PROCEDURE — 3078F DIAST BP <80 MM HG: CPT | Performed by: FAMILY MEDICINE

## 2023-10-17 PROCEDURE — G8417 CALC BMI ABV UP PARAM F/U: HCPCS | Performed by: FAMILY MEDICINE

## 2023-10-17 PROCEDURE — 99214 OFFICE O/P EST MOD 30 MIN: CPT | Performed by: FAMILY MEDICINE

## 2023-10-17 PROCEDURE — 3077F SYST BP >= 140 MM HG: CPT | Performed by: FAMILY MEDICINE

## 2023-10-17 PROCEDURE — 3044F HG A1C LEVEL LT 7.0%: CPT | Performed by: FAMILY MEDICINE

## 2023-10-17 PROCEDURE — G8484 FLU IMMUNIZE NO ADMIN: HCPCS | Performed by: FAMILY MEDICINE

## 2023-10-17 PROCEDURE — 70450 CT HEAD/BRAIN W/O DYE: CPT

## 2023-10-17 PROCEDURE — 1090F PRES/ABSN URINE INCON ASSESS: CPT | Performed by: FAMILY MEDICINE

## 2023-10-17 PROCEDURE — 4004F PT TOBACCO SCREEN RCVD TLK: CPT | Performed by: FAMILY MEDICINE

## 2023-10-17 PROCEDURE — 1123F ACP DISCUSS/DSCN MKR DOCD: CPT | Performed by: FAMILY MEDICINE

## 2023-10-17 PROCEDURE — G8399 PT W/DXA RESULTS DOCUMENT: HCPCS | Performed by: FAMILY MEDICINE

## 2023-10-17 PROCEDURE — G8427 DOCREV CUR MEDS BY ELIG CLIN: HCPCS | Performed by: FAMILY MEDICINE

## 2023-10-17 RX ORDER — TRAZODONE HYDROCHLORIDE 100 MG/1
200 TABLET ORAL NIGHTLY
Qty: 180 TABLET | Refills: 1 | Status: SHIPPED | OUTPATIENT
Start: 2023-10-17

## 2023-10-17 SDOH — ECONOMIC STABILITY: FOOD INSECURITY: WITHIN THE PAST 12 MONTHS, THE FOOD YOU BOUGHT JUST DIDN'T LAST AND YOU DIDN'T HAVE MONEY TO GET MORE.: PATIENT DECLINED

## 2023-10-17 SDOH — ECONOMIC STABILITY: FOOD INSECURITY: WITHIN THE PAST 12 MONTHS, YOU WORRIED THAT YOUR FOOD WOULD RUN OUT BEFORE YOU GOT MONEY TO BUY MORE.: PATIENT DECLINED

## 2023-10-17 SDOH — ECONOMIC STABILITY: INCOME INSECURITY: HOW HARD IS IT FOR YOU TO PAY FOR THE VERY BASICS LIKE FOOD, HOUSING, MEDICAL CARE, AND HEATING?: PATIENT DECLINED

## 2023-10-17 SDOH — ECONOMIC STABILITY: HOUSING INSECURITY
IN THE LAST 12 MONTHS, WAS THERE A TIME WHEN YOU DID NOT HAVE A STEADY PLACE TO SLEEP OR SLEPT IN A SHELTER (INCLUDING NOW)?: PATIENT REFUSED

## 2023-10-17 ASSESSMENT — PATIENT HEALTH QUESTIONNAIRE - PHQ9
4. FEELING TIRED OR HAVING LITTLE ENERGY: 1
8. MOVING OR SPEAKING SO SLOWLY THAT OTHER PEOPLE COULD HAVE NOTICED. OR THE OPPOSITE, BEING SO FIGETY OR RESTLESS THAT YOU HAVE BEEN MOVING AROUND A LOT MORE THAN USUAL: 0
9. THOUGHTS THAT YOU WOULD BE BETTER OFF DEAD, OR OF HURTING YOURSELF: 0
7. TROUBLE CONCENTRATING ON THINGS, SUCH AS READING THE NEWSPAPER OR WATCHING TELEVISION: 0
1. LITTLE INTEREST OR PLEASURE IN DOING THINGS: 3
SUM OF ALL RESPONSES TO PHQ QUESTIONS 1-9: 13
SUM OF ALL RESPONSES TO PHQ9 QUESTIONS 1 & 2: 6
5. POOR APPETITE OR OVEREATING: 3
6. FEELING BAD ABOUT YOURSELF - OR THAT YOU ARE A FAILURE OR HAVE LET YOURSELF OR YOUR FAMILY DOWN: 3
SUM OF ALL RESPONSES TO PHQ QUESTIONS 1-9: 13
SUM OF ALL RESPONSES TO PHQ QUESTIONS 1-9: 13
10. IF YOU CHECKED OFF ANY PROBLEMS, HOW DIFFICULT HAVE THESE PROBLEMS MADE IT FOR YOU TO DO YOUR WORK, TAKE CARE OF THINGS AT HOME, OR GET ALONG WITH OTHER PEOPLE: 0
3. TROUBLE FALLING OR STAYING ASLEEP: 0
2. FEELING DOWN, DEPRESSED OR HOPELESS: 3
SUM OF ALL RESPONSES TO PHQ QUESTIONS 1-9: 13

## 2024-01-19 RX ORDER — METHOCARBAMOL 500 MG/1
TABLET, FILM COATED ORAL
Qty: 120 TABLET | Refills: 1 | Status: SHIPPED | OUTPATIENT
Start: 2024-01-19

## 2024-01-19 NOTE — TELEPHONE ENCOUNTER
PCP: Patricia Mcnair DO    Last Visit 10/17/2023   Future Appointments   Date Time Provider Department Center   2/27/2024  1:00 PM Riley Rod MD [facility]       Requested Prescriptions     Pending Prescriptions Disp Refills    methocarbamol (ROBAXIN) 500 MG tablet [Pharmacy Med Name: METHOCARBAMOL 500MG TABLETS] 120 tablet 3     Sig: TAKE 1 TABLET BY MOUTH FOUR TIMES DAILY AS NEEDED FOR BACK PAIN         Other Comments: Last Refill   08/14/23

## 2024-02-02 RX ORDER — DULOXETIN HYDROCHLORIDE 60 MG/1
60 CAPSULE, DELAYED RELEASE ORAL DAILY
Qty: 90 CAPSULE | Refills: 1 | Status: SHIPPED | OUTPATIENT
Start: 2024-02-02

## 2024-03-19 NOTE — TELEPHONE ENCOUNTER
PCP: Patricia Mcnair DO    Last Visit 10/17/2023   No future appointments.    Requested Prescriptions     Pending Prescriptions Disp Refills    methocarbamol (ROBAXIN) 500 MG tablet [Pharmacy Med Name: METHOCARBAMOL 500MG TABLETS] 120 tablet 1     Sig: TAKE 1 TABLET BY MOUTH FOUR TIMES DAILY AS NEEDED FOR BACK PAIN         Other Comments: Last Refill   01/19/24

## 2024-03-20 RX ORDER — METHOCARBAMOL 500 MG/1
TABLET, FILM COATED ORAL
Qty: 120 TABLET | Refills: 1 | Status: SHIPPED | OUTPATIENT
Start: 2024-03-20

## 2024-04-03 ENCOUNTER — APPOINTMENT (OUTPATIENT)
Facility: HOSPITAL | Age: 84
End: 2024-04-03
Payer: MEDICARE

## 2024-04-03 ENCOUNTER — HOSPITAL ENCOUNTER (OUTPATIENT)
Facility: HOSPITAL | Age: 84
Setting detail: OBSERVATION
LOS: 2 days | Discharge: HOME HEALTH | End: 2024-04-05
Attending: EMERGENCY MEDICINE | Admitting: INTERNAL MEDICINE
Payer: MEDICARE

## 2024-04-03 DIAGNOSIS — R53.1 GENERAL WEAKNESS: ICD-10-CM

## 2024-04-03 DIAGNOSIS — R55 SYNCOPE AND COLLAPSE: Primary | ICD-10-CM

## 2024-04-03 DIAGNOSIS — E86.0 DEHYDRATION: ICD-10-CM

## 2024-04-03 DIAGNOSIS — M62.82 NON-TRAUMATIC RHABDOMYOLYSIS: ICD-10-CM

## 2024-04-03 LAB
ALBUMIN SERPL-MCNC: 3.7 G/DL (ref 3.5–5)
ALBUMIN/GLOB SERPL: 1 (ref 1.1–2.2)
ALP SERPL-CCNC: 113 U/L (ref 45–117)
ALT SERPL-CCNC: 40 U/L (ref 12–78)
ANION GAP SERPL CALC-SCNC: 14 MMOL/L (ref 5–15)
APPEARANCE UR: CLEAR
AST SERPL-CCNC: 125 U/L (ref 15–37)
BACTERIA URNS QL MICRO: NEGATIVE /HPF
BASOPHILS # BLD: 0.1 K/UL (ref 0–0.1)
BASOPHILS NFR BLD: 1 % (ref 0–1)
BILIRUB SERPL-MCNC: 0.6 MG/DL (ref 0.2–1)
BILIRUB UR QL: NEGATIVE
BUN SERPL-MCNC: 10 MG/DL (ref 6–20)
BUN/CREAT SERPL: 9 (ref 12–20)
CALCIUM SERPL-MCNC: 9.3 MG/DL (ref 8.5–10.1)
CHLORIDE SERPL-SCNC: 100 MMOL/L (ref 97–108)
CK SERPL-CCNC: 4448 U/L (ref 26–192)
CO2 SERPL-SCNC: 25 MMOL/L (ref 21–32)
COLOR UR: ABNORMAL
CREAT SERPL-MCNC: 1.08 MG/DL (ref 0.55–1.02)
D DIMER PPP FEU-MCNC: 1.74 MG/L FEU (ref 0–0.65)
DIFFERENTIAL METHOD BLD: ABNORMAL
EOSINOPHIL # BLD: 0 K/UL (ref 0–0.4)
EOSINOPHIL NFR BLD: 0 % (ref 0–7)
EPITH CASTS URNS QL MICRO: ABNORMAL /LPF
ERYTHROCYTE [DISTWIDTH] IN BLOOD BY AUTOMATED COUNT: 19.8 % (ref 11.5–14.5)
GLOBULIN SER CALC-MCNC: 3.8 G/DL (ref 2–4)
GLUCOSE SERPL-MCNC: 216 MG/DL (ref 65–100)
GLUCOSE UR STRIP.AUTO-MCNC: NEGATIVE MG/DL
HCT VFR BLD AUTO: 37.6 % (ref 35–47)
HGB BLD-MCNC: 11.7 G/DL (ref 11.5–16)
HGB UR QL STRIP: ABNORMAL
IMM GRANULOCYTES # BLD AUTO: 0 K/UL (ref 0–0.04)
IMM GRANULOCYTES NFR BLD AUTO: 0 % (ref 0–0.5)
KETONES UR QL STRIP.AUTO: 15 MG/DL
LEUKOCYTE ESTERASE UR QL STRIP.AUTO: NEGATIVE
LYMPHOCYTES # BLD: 1.1 K/UL (ref 0.8–3.5)
LYMPHOCYTES NFR BLD: 9 % (ref 12–49)
MCH RBC QN AUTO: 24 PG (ref 26–34)
MCHC RBC AUTO-ENTMCNC: 31.1 G/DL (ref 30–36.5)
MCV RBC AUTO: 77.2 FL (ref 80–99)
MONOCYTES # BLD: 0.8 K/UL (ref 0–1)
MONOCYTES NFR BLD: 6 % (ref 5–13)
NEUTS SEG # BLD: 10.6 K/UL (ref 1.8–8)
NEUTS SEG NFR BLD: 84 % (ref 32–75)
NITRITE UR QL STRIP.AUTO: NEGATIVE
NRBC # BLD: 0 K/UL (ref 0–0.01)
NRBC BLD-RTO: 0 PER 100 WBC
PH UR STRIP: 6 (ref 5–8)
PLATELET # BLD AUTO: 488 K/UL (ref 150–400)
PMV BLD AUTO: 9.1 FL (ref 8.9–12.9)
POTASSIUM SERPL-SCNC: 3.4 MMOL/L (ref 3.5–5.1)
PROT SERPL-MCNC: 7.5 G/DL (ref 6.4–8.2)
PROT UR STRIP-MCNC: 30 MG/DL
RBC # BLD AUTO: 4.87 M/UL (ref 3.8–5.2)
RBC #/AREA URNS HPF: ABNORMAL /HPF (ref 0–5)
SODIUM SERPL-SCNC: 139 MMOL/L (ref 136–145)
SP GR UR REFRACTOMETRY: 1.01 (ref 1–1.03)
TROPONIN I SERPL HS-MCNC: 21 NG/L (ref 0–51)
UROBILINOGEN UR QL STRIP.AUTO: 0.2 EU/DL (ref 0.2–1)
WBC # BLD AUTO: 12.7 K/UL (ref 3.6–11)
WBC URNS QL MICRO: ABNORMAL /HPF (ref 0–4)

## 2024-04-03 PROCEDURE — 80053 COMPREHEN METABOLIC PANEL: CPT

## 2024-04-03 PROCEDURE — 84484 ASSAY OF TROPONIN QUANT: CPT

## 2024-04-03 PROCEDURE — 82550 ASSAY OF CK (CPK): CPT

## 2024-04-03 PROCEDURE — 99285 EMERGENCY DEPT VISIT HI MDM: CPT

## 2024-04-03 PROCEDURE — 2580000003 HC RX 258: Performed by: EMERGENCY MEDICINE

## 2024-04-03 PROCEDURE — 96361 HYDRATE IV INFUSION ADD-ON: CPT

## 2024-04-03 PROCEDURE — 96360 HYDRATION IV INFUSION INIT: CPT

## 2024-04-03 PROCEDURE — 36415 COLL VENOUS BLD VENIPUNCTURE: CPT

## 2024-04-03 PROCEDURE — 6360000004 HC RX CONTRAST MEDICATION: Performed by: EMERGENCY MEDICINE

## 2024-04-03 PROCEDURE — 70450 CT HEAD/BRAIN W/O DYE: CPT

## 2024-04-03 PROCEDURE — 81001 URINALYSIS AUTO W/SCOPE: CPT

## 2024-04-03 PROCEDURE — 6370000000 HC RX 637 (ALT 250 FOR IP): Performed by: EMERGENCY MEDICINE

## 2024-04-03 PROCEDURE — 85025 COMPLETE CBC W/AUTO DIFF WBC: CPT

## 2024-04-03 PROCEDURE — 85379 FIBRIN DEGRADATION QUANT: CPT

## 2024-04-03 PROCEDURE — 93005 ELECTROCARDIOGRAM TRACING: CPT | Performed by: EMERGENCY MEDICINE

## 2024-04-03 PROCEDURE — 71275 CT ANGIOGRAPHY CHEST: CPT

## 2024-04-03 PROCEDURE — 2060000000 HC ICU INTERMEDIATE R&B

## 2024-04-03 RX ORDER — 0.9 % SODIUM CHLORIDE 0.9 %
1000 INTRAVENOUS SOLUTION INTRAVENOUS ONCE
Status: COMPLETED | OUTPATIENT
Start: 2024-04-03 | End: 2024-04-03

## 2024-04-03 RX ORDER — CARVEDILOL 6.25 MG/1
12.5 TABLET ORAL
Status: COMPLETED | OUTPATIENT
Start: 2024-04-03 | End: 2024-04-03

## 2024-04-03 RX ADMIN — CARVEDILOL 12.5 MG: 6.25 TABLET, FILM COATED ORAL at 18:29

## 2024-04-03 RX ADMIN — SODIUM CHLORIDE 1000 ML: 9 INJECTION, SOLUTION INTRAVENOUS at 18:18

## 2024-04-03 RX ADMIN — IOPAMIDOL 100 ML: 755 INJECTION, SOLUTION INTRAVENOUS at 18:02

## 2024-04-03 NOTE — ED NOTES
Pt met in bed with friend at the bedside. Pt helped onto the bedside commode. Updated on admission process. Waiting for admission.

## 2024-04-03 NOTE — ED TRIAGE NOTES
84 year old female pt comes to the ED via POV for a CC Fall and syncope. Pt states that last night at 1030 she got stuck on the toilet and been there since 1530. Pt states she also had multiple syncopal episodes and falls for the past 2 weeks. Pt is A&Ox4. EMS got Bs.

## 2024-04-03 NOTE — ED PROVIDER NOTES
All other components within normal limits   CK - Abnormal; Notable for the following components:    Total CK 4,448 (*)     All other components within normal limits   TROPONIN       All other labs were within normal range or not returned as of this dictation.    EMERGENCY DEPARTMENT COURSE and DIFFERENTIAL DIAGNOSIS/MDM:   Vitals:    Vitals:    04/03/24 1630 04/03/24 1730 04/03/24 1829 04/03/24 1855   BP: (!) 190/97 (!) 164/111 (!) 224/105 (!) 209/99   Pulse: 97 85 92 93   Resp: 14 23  21   Temp: 99.3 °F (37.4 °C)      TempSrc: Oral      SpO2: 97% 93%  93%           Medical Decision Making  84-year-old female comes in as above.  Patient states that last night she went to the bathroom was unable to get up off the toilet until 330 this afternoon when she was found by neighbors.  She presents here with general fatigue and weakness.  CK elevated at 4500.  D-dimer was elevated with no acute findings on her chest CT.  This was obtained due to multiple syncopal episodes over the last 2 weeks.  Remainder of the blood work is generally unremarkable.  Patient will be admitted for multiple syncopal episodes as well as the stability.  She otherwise in good spirits.  She has received some IV fluid here.  Hypertensive here but has not taken any of her home meds.  Home meds have been ordered.  No acute complaints otherwise    Amount and/or Complexity of Data Reviewed  Labs: ordered. Decision-making details documented in ED Course.  Radiology: ordered. Decision-making details documented in ED Course.  ECG/medicine tests: ordered and independent interpretation performed. Decision-making details documented in ED Course.    Risk  Prescription drug management.  Decision regarding hospitalization.            REASSESSMENT     ED Course as of 04/03/24 2019 Wed Apr 03, 2024   1641 EKG 1636  Independent evaluation shows a sinus rhythm at 93 bpm.  First-degree AV block.  Left axis deviation.  There is a right bundle branch block pattern.   otherwise in good spirits.  She has received some IV fluid here.  Hypertensive here but has not taken any of her home meds.  Home meds have been ordered.  No acute complaints otherwise                Humza Tran,   04/03/24 2020

## 2024-04-04 ENCOUNTER — APPOINTMENT (OUTPATIENT)
Facility: HOSPITAL | Age: 84
End: 2024-04-04
Attending: INTERNAL MEDICINE
Payer: MEDICARE

## 2024-04-04 ENCOUNTER — APPOINTMENT (OUTPATIENT)
Facility: HOSPITAL | Age: 84
End: 2024-04-04
Payer: MEDICARE

## 2024-04-04 PROBLEM — M62.82 RHABDOMYOLYSIS: Status: ACTIVE | Noted: 2024-04-04

## 2024-04-04 LAB
B PERT DNA SPEC QL NAA+PROBE: NOT DETECTED
BORDETELLA PARAPERTUSSIS BY PCR: NOT DETECTED
C PNEUM DNA SPEC QL NAA+PROBE: NOT DETECTED
EKG ATRIAL RATE: 93 BPM
EKG DIAGNOSIS: NORMAL
EKG P AXIS: 41 DEGREES
EKG P-R INTERVAL: 232 MS
EKG Q-T INTERVAL: 438 MS
EKG QRS DURATION: 140 MS
EKG QTC CALCULATION (BAZETT): 544 MS
EKG R AXIS: -71 DEGREES
EKG T AXIS: 29 DEGREES
EKG VENTRICULAR RATE: 93 BPM
FLUAV SUBTYP SPEC NAA+PROBE: NOT DETECTED
FLUBV RNA SPEC QL NAA+PROBE: NOT DETECTED
GLUCOSE BLD STRIP.AUTO-MCNC: 105 MG/DL (ref 65–117)
GLUCOSE BLD STRIP.AUTO-MCNC: 143 MG/DL (ref 65–117)
GLUCOSE BLD STRIP.AUTO-MCNC: 156 MG/DL (ref 65–117)
HADV DNA SPEC QL NAA+PROBE: NOT DETECTED
HCOV 229E RNA SPEC QL NAA+PROBE: NOT DETECTED
HCOV HKU1 RNA SPEC QL NAA+PROBE: NOT DETECTED
HCOV NL63 RNA SPEC QL NAA+PROBE: NOT DETECTED
HCOV OC43 RNA SPEC QL NAA+PROBE: NOT DETECTED
HMPV RNA SPEC QL NAA+PROBE: NOT DETECTED
HPIV1 RNA SPEC QL NAA+PROBE: NOT DETECTED
HPIV2 RNA SPEC QL NAA+PROBE: NOT DETECTED
HPIV3 RNA SPEC QL NAA+PROBE: NOT DETECTED
HPIV4 RNA SPEC QL NAA+PROBE: NOT DETECTED
M PNEUMO DNA SPEC QL NAA+PROBE: NOT DETECTED
RSV RNA SPEC QL NAA+PROBE: NOT DETECTED
RV+EV RNA SPEC QL NAA+PROBE: NOT DETECTED
SARS-COV-2 RNA RESP QL NAA+PROBE: NOT DETECTED
SERVICE CMNT-IMP: ABNORMAL
SERVICE CMNT-IMP: ABNORMAL
SERVICE CMNT-IMP: NORMAL

## 2024-04-04 PROCEDURE — 2580000003 HC RX 258: Performed by: INTERNAL MEDICINE

## 2024-04-04 PROCEDURE — 70551 MRI BRAIN STEM W/O DYE: CPT

## 2024-04-04 PROCEDURE — 6370000000 HC RX 637 (ALT 250 FOR IP): Performed by: EMERGENCY MEDICINE

## 2024-04-04 PROCEDURE — 82962 GLUCOSE BLOOD TEST: CPT

## 2024-04-04 PROCEDURE — 2060000000 HC ICU INTERMEDIATE R&B

## 2024-04-04 PROCEDURE — 0202U NFCT DS 22 TRGT SARS-COV-2: CPT

## 2024-04-04 PROCEDURE — 6360000002 HC RX W HCPCS: Performed by: INTERNAL MEDICINE

## 2024-04-04 PROCEDURE — 6370000000 HC RX 637 (ALT 250 FOR IP): Performed by: INTERNAL MEDICINE

## 2024-04-04 RX ORDER — METHOCARBAMOL 500 MG/1
500 TABLET, FILM COATED ORAL 4 TIMES DAILY PRN
Status: DISCONTINUED | OUTPATIENT
Start: 2024-04-04 | End: 2024-04-05 | Stop reason: HOSPADM

## 2024-04-04 RX ORDER — ATORVASTATIN CALCIUM 40 MG/1
40 TABLET, FILM COATED ORAL DAILY
Status: DISCONTINUED | OUTPATIENT
Start: 2024-04-04 | End: 2024-04-05 | Stop reason: HOSPADM

## 2024-04-04 RX ORDER — MULTIVITAMIN WITH IRON
1 TABLET ORAL DAILY
Status: DISCONTINUED | OUTPATIENT
Start: 2024-04-04 | End: 2024-04-05 | Stop reason: HOSPADM

## 2024-04-04 RX ORDER — INSULIN LISPRO 100 [IU]/ML
0-4 INJECTION, SOLUTION INTRAVENOUS; SUBCUTANEOUS
Status: DISCONTINUED | OUTPATIENT
Start: 2024-04-04 | End: 2024-04-05 | Stop reason: HOSPADM

## 2024-04-04 RX ORDER — ONDANSETRON 2 MG/ML
4 INJECTION INTRAMUSCULAR; INTRAVENOUS EVERY 6 HOURS PRN
Status: DISCONTINUED | OUTPATIENT
Start: 2024-04-04 | End: 2024-04-05 | Stop reason: HOSPADM

## 2024-04-04 RX ORDER — SODIUM CHLORIDE 9 MG/ML
INJECTION, SOLUTION INTRAVENOUS CONTINUOUS
Status: DISCONTINUED | OUTPATIENT
Start: 2024-04-04 | End: 2024-04-05 | Stop reason: HOSPADM

## 2024-04-04 RX ORDER — TRAZODONE HYDROCHLORIDE 100 MG/1
200 TABLET ORAL NIGHTLY
Status: DISCONTINUED | OUTPATIENT
Start: 2024-04-04 | End: 2024-04-05 | Stop reason: HOSPADM

## 2024-04-04 RX ORDER — VITAMIN B COMPLEX
2000 TABLET ORAL DAILY
Status: DISCONTINUED | OUTPATIENT
Start: 2024-04-04 | End: 2024-04-05 | Stop reason: HOSPADM

## 2024-04-04 RX ORDER — ONDANSETRON 4 MG/1
4 TABLET, ORALLY DISINTEGRATING ORAL EVERY 8 HOURS PRN
Status: DISCONTINUED | OUTPATIENT
Start: 2024-04-04 | End: 2024-04-05 | Stop reason: HOSPADM

## 2024-04-04 RX ORDER — ASCORBIC ACID 500 MG
500 TABLET ORAL DAILY
Status: DISCONTINUED | OUTPATIENT
Start: 2024-04-04 | End: 2024-04-05 | Stop reason: HOSPADM

## 2024-04-04 RX ORDER — INSULIN LISPRO 100 [IU]/ML
0-4 INJECTION, SOLUTION INTRAVENOUS; SUBCUTANEOUS NIGHTLY
Status: DISCONTINUED | OUTPATIENT
Start: 2024-04-04 | End: 2024-04-05 | Stop reason: HOSPADM

## 2024-04-04 RX ORDER — BUSPIRONE HYDROCHLORIDE 10 MG/1
10 TABLET ORAL 3 TIMES DAILY
Status: DISCONTINUED | OUTPATIENT
Start: 2024-04-04 | End: 2024-04-05 | Stop reason: HOSPADM

## 2024-04-04 RX ORDER — ACETAMINOPHEN 500 MG
1000 TABLET ORAL
Status: COMPLETED | OUTPATIENT
Start: 2024-04-04 | End: 2024-04-04

## 2024-04-04 RX ORDER — ACETAMINOPHEN 650 MG/1
650 SUPPOSITORY RECTAL EVERY 6 HOURS PRN
Status: DISCONTINUED | OUTPATIENT
Start: 2024-04-04 | End: 2024-04-05 | Stop reason: HOSPADM

## 2024-04-04 RX ORDER — SODIUM CHLORIDE 9 MG/ML
INJECTION, SOLUTION INTRAVENOUS PRN
Status: DISCONTINUED | OUTPATIENT
Start: 2024-04-04 | End: 2024-04-05 | Stop reason: HOSPADM

## 2024-04-04 RX ORDER — CARVEDILOL 12.5 MG/1
12.5 TABLET ORAL 2 TIMES DAILY WITH MEALS
Status: DISCONTINUED | OUTPATIENT
Start: 2024-04-04 | End: 2024-04-05

## 2024-04-04 RX ORDER — SODIUM CHLORIDE 0.9 % (FLUSH) 0.9 %
5-40 SYRINGE (ML) INJECTION EVERY 12 HOURS SCHEDULED
Status: DISCONTINUED | OUTPATIENT
Start: 2024-04-04 | End: 2024-04-05 | Stop reason: HOSPADM

## 2024-04-04 RX ORDER — DULOXETIN HYDROCHLORIDE 60 MG/1
60 CAPSULE, DELAYED RELEASE ORAL DAILY
Status: DISCONTINUED | OUTPATIENT
Start: 2024-04-04 | End: 2024-04-05 | Stop reason: HOSPADM

## 2024-04-04 RX ORDER — ENOXAPARIN SODIUM 100 MG/ML
40 INJECTION SUBCUTANEOUS DAILY
Status: DISCONTINUED | OUTPATIENT
Start: 2024-04-04 | End: 2024-04-05 | Stop reason: HOSPADM

## 2024-04-04 RX ORDER — SODIUM CHLORIDE 0.9 % (FLUSH) 0.9 %
5-40 SYRINGE (ML) INJECTION PRN
Status: DISCONTINUED | OUTPATIENT
Start: 2024-04-04 | End: 2024-04-05 | Stop reason: HOSPADM

## 2024-04-04 RX ORDER — ASPIRIN 81 MG/1
81 TABLET ORAL 2 TIMES DAILY
Status: DISCONTINUED | OUTPATIENT
Start: 2024-04-04 | End: 2024-04-05 | Stop reason: HOSPADM

## 2024-04-04 RX ORDER — NEOMYCIN/POLYMYXIN B/PRAMOXINE 3.5-10K-1
1 CREAM (GRAM) TOPICAL DAILY
Status: DISCONTINUED | OUTPATIENT
Start: 2024-04-04 | End: 2024-04-04 | Stop reason: CLARIF

## 2024-04-04 RX ORDER — DEXTROSE MONOHYDRATE 100 MG/ML
INJECTION, SOLUTION INTRAVENOUS CONTINUOUS PRN
Status: DISCONTINUED | OUTPATIENT
Start: 2024-04-04 | End: 2024-04-05 | Stop reason: HOSPADM

## 2024-04-04 RX ORDER — HYDRALAZINE HYDROCHLORIDE 20 MG/ML
10 INJECTION INTRAMUSCULAR; INTRAVENOUS EVERY 6 HOURS PRN
Status: DISCONTINUED | OUTPATIENT
Start: 2024-04-04 | End: 2024-04-05 | Stop reason: HOSPADM

## 2024-04-04 RX ORDER — ACETAMINOPHEN 325 MG/1
650 TABLET ORAL EVERY 6 HOURS PRN
Status: DISCONTINUED | OUTPATIENT
Start: 2024-04-04 | End: 2024-04-05 | Stop reason: HOSPADM

## 2024-04-04 RX ADMIN — ACETAMINOPHEN 650 MG: 325 TABLET ORAL at 20:49

## 2024-04-04 RX ADMIN — CARVEDILOL 12.5 MG: 12.5 TABLET, FILM COATED ORAL at 11:30

## 2024-04-04 RX ADMIN — ACETAMINOPHEN 1000 MG: 500 TABLET ORAL at 02:32

## 2024-04-04 RX ADMIN — SODIUM CHLORIDE: 9 INJECTION, SOLUTION INTRAVENOUS at 11:18

## 2024-04-04 RX ADMIN — SODIUM CHLORIDE: 9 INJECTION, SOLUTION INTRAVENOUS at 20:55

## 2024-04-04 RX ADMIN — BUSPIRONE HYDROCHLORIDE 10 MG: 10 TABLET ORAL at 20:49

## 2024-04-04 RX ADMIN — OXYCODONE HYDROCHLORIDE AND ACETAMINOPHEN 500 MG: 500 TABLET ORAL at 11:29

## 2024-04-04 RX ADMIN — ATORVASTATIN CALCIUM 40 MG: 40 TABLET, FILM COATED ORAL at 11:18

## 2024-04-04 RX ADMIN — BUSPIRONE HYDROCHLORIDE 10 MG: 10 TABLET ORAL at 15:40

## 2024-04-04 RX ADMIN — SODIUM CHLORIDE, PRESERVATIVE FREE 10 ML: 5 INJECTION INTRAVENOUS at 11:24

## 2024-04-04 RX ADMIN — ENOXAPARIN SODIUM 40 MG: 100 INJECTION SUBCUTANEOUS at 11:18

## 2024-04-04 RX ADMIN — Medication 2000 UNITS: at 11:18

## 2024-04-04 RX ADMIN — ASPIRIN 81 MG: 81 TABLET, COATED ORAL at 20:49

## 2024-04-04 RX ADMIN — TRAZODONE HYDROCHLORIDE 200 MG: 100 TABLET ORAL at 20:49

## 2024-04-04 RX ADMIN — ASPIRIN 81 MG: 81 TABLET, COATED ORAL at 11:25

## 2024-04-04 RX ADMIN — SODIUM CHLORIDE, PRESERVATIVE FREE 10 ML: 5 INJECTION INTRAVENOUS at 21:00

## 2024-04-04 RX ADMIN — CARVEDILOL 12.5 MG: 12.5 TABLET, FILM COATED ORAL at 18:26

## 2024-04-04 RX ADMIN — THERA TABS 1 TABLET: TAB at 11:18

## 2024-04-04 RX ADMIN — BUSPIRONE HYDROCHLORIDE 10 MG: 10 TABLET ORAL at 11:18

## 2024-04-04 RX ADMIN — DULOXETINE HYDROCHLORIDE 60 MG: 60 CAPSULE, DELAYED RELEASE ORAL at 11:18

## 2024-04-04 RX ADMIN — HYDRALAZINE HYDROCHLORIDE 10 MG: 20 INJECTION INTRAMUSCULAR; INTRAVENOUS at 11:56

## 2024-04-04 ASSESSMENT — PAIN SCALES - GENERAL
PAINLEVEL_OUTOF10: 4
PAINLEVEL_OUTOF10: 2

## 2024-04-04 ASSESSMENT — PAIN DESCRIPTION - LOCATION
LOCATION: BACK
LOCATION: BACK

## 2024-04-04 ASSESSMENT — PAIN DESCRIPTION - PAIN TYPE: TYPE: CHRONIC PAIN

## 2024-04-04 NOTE — ED NOTES
Patient has left department via stretcher with no signs of acute distress. Warm blankets applied.

## 2024-04-04 NOTE — PROGRESS NOTES
Physician Progress Note      PATIENT:               MAURICIO DURÁN  CSN #:                  [id number]  :                       1940  ADMIT DATE:       4/3/2024 4:18 PM  DISCH DATE:  RESPONDING  PROVIDER #:        Samina Kirk MD          QUERY TEXT:    Pt admitted with Weakness.  Pt noted to have documented rhabdomyolysis and   elevated CK. If possible, please document in progress notes and discharge   summary if you are evaluating and/or treating any of the following:    The medical record reflects the following:  Risk Factors: prolonged time on toilet \"unable to get off toilet due to   weakness\"    Clinical Indicators: generalized weakness, CK 4448,  sCr at baseline    Treatment: IVFs    Per [email or web address]-hy-wswsijcqfddike  Traumatic rhabdomyolysis cause examples: crush syndrome, prolonged   immobilization  Nontraumatic rhabdomyolysis cause examples:  marked exertion, hyperthermia,   metabolic myopathy, drugs or toxins, infections, electrolyte disorders.  Options provided:  -- Traumatic rhabdomyolysis  -- Nontraumatic rhabdomyolysis  -- Other - I will add my own diagnosis  -- Disagree - Not applicable / Not valid  -- Disagree - Clinically unable to determine / Unknown  -- Refer to Clinical Documentation Reviewer    PROVIDER RESPONSE TEXT:    Provider is clinically unable to determine a response to this query.    Query created by: Tavia Cobb on 2024 1:58 PM      Electronically signed by:  Samina Kirk MD 2024 4:45 PM

## 2024-04-04 NOTE — ED NOTES
Met patient at bedside. Patient is A &  O x 4. Patient updated transport ETA. Call bell within reach. Patient's belongings within reach. Patient has no complaints at this time.

## 2024-04-04 NOTE — ED NOTES
TRANSFER - OUT REPORT:    Verbal report given to KELLIE Villanueva on Tayler Marinelli  being transferred to [facility] for routine progression of patient care       Report consisted of patient's Situation, Background, Assessment and   Recommendations(SBAR).     Information from the following report(s) Nurse Handoff Report, ED Encounter Summary, ED SBAR, Adult Overview, MAR, Recent Results, and Med Rec Status was reviewed with the receiving nurse.    Torrey Fall Assessment:    Presents to emergency department  because of falls (Syncope, seizure, or loss of consciousness): Yes  Age > 70: Yes  Altered Mental Status, Intoxication with alcohol or substance confusion (Disorientation, impaired judgment, poor safety awaremess, or inability to follow instructions): Yes  Impaired Mobility: Ambulates or transfers with assistive devices or assistance; Unable to ambulate or transer.: Yes  Nursing Judgement: Yes          Lines:   Peripheral IV 04/03/24 Left Antecubital (Active)        Opportunity for questions and clarification was provided.      Patient transported with:  Tech

## 2024-04-04 NOTE — PROGRESS NOTES
Occupational Therapy  4/4/2024    Chart reviewed. Patient being transported off unit at time of attempt for MRI. Will defer and follow-up as able/appropriate.    Kami Cardoza, VIN, OTR/L

## 2024-04-04 NOTE — PROGRESS NOTES
Physical Therapy    Chart reviewed. Patient being transported off unit at time of attempt for MRI. Will defer and follow-up as able/appropriate.    Minnie Abernathy, PT, DPT

## 2024-04-04 NOTE — CARE COORDINATION
Care Management Initial Assessment       RUR: 15%  Readmission? No  1st IM letter given? Yes - 4/4    CHRISTA: Pt admitted from home, alone    Transport: Friend    CM met with patient at bedside to introduce self and role. Pt lives in two story townhouse, alone.    ADLs: Independent  DME: [facility]  PCP follow up: Dionne Mcnair   Previous Home Health: None  Previous Skilled Nursing Facility: None  Previous Inpatient Rehab: [facility]  Insurance verified: Yes, Medicare A&B and [employer] insured  Pharmacy: [facility]   Emergency Contact: FriendLesly Saenz [phone]    CM will follow patient progress and assist as needed with CHRISTA plan.     04/04/24 1324   Service Assessment   Patient Orientation Alert and Oriented;Person;Situation;Self;Place   Cognition Alert   History Provided By Patient   Primary Caregiver Self   Support Systems Friends/Neighbors   PCP Verified by CM Yes   Last Visit to PCP Within last 6 months   Prior Functional Level Independent in ADLs/IADLs   Current Functional Level Independent in ADLs/IADLs   Can patient return to prior living arrangement Unknown at present   Ability to make needs known: Fair   Family able to assist with home care needs: No   Would you like for me to discuss the discharge plan with any other family members/significant others, and if so, who? No   Financial Resources Medicare   Social/Functional History   Lives With Alone   Type of Home House  (Townhouse)   Home Layout Two level   Home Equipment Walker, rolling   ADL Assistance Independent   Homemaking Assistance Independent   Homemaking Responsibilities Yes   Ambulation Assistance Independent   Transfer Assistance Independent   Discharge Planning   Type of Residence House   Living Arrangements Alone   Current Services Prior To Admission Durable Medical Equipment   Current DME Prior to Arrival Walker   Patient expects to be discharged to: House   Services At/After Discharge   Confirm Follow Up Transport Friends

## 2024-04-04 NOTE — ED NOTES
Bedside and Verbal shift change report given to Sharon STOUT RN (oncoming nurse) by KELLIE Roberts (offgoing nurse). Report included the following information Nurse Handoff Report, ED Encounter Summary, ED SBAR, Adult Overview, MAR, Recent Results, and Med Rec Status.

## 2024-04-04 NOTE — H&P
History and Physical    Date of Service:  4/4/2024  Primary Care Provider: Patricia Mcnair DO  Source of information: The patient and Chart review    Chief Complaint: Fall and Loss of Consciousness      History of Presenting Illness:   Tayler Marinelli is a 84 y.o. female with arthritis, chronic pain, obesity s/p gastric bypass, T2DM, HTN, CAD, depression/anxiety, chronic HFpEF, PUD, HLD, h/o bowel obstruction who was [facility]S to [facility] yesterday as pt was on the toilet from 2230 the night before to 1530 yesterday due to weakness and inability to get up; she denied constipation. Neighbor came over to check on her and called EMS. Pt c/o a dry cough. She reports multiple syncopal episodes in the last 2 weeks but no head trauma or falls. Pt was admitted to  service and transferred to [facility] today. She c/o mild headache and neck pain. BP is elevated.      REVIEW OF SYSTEMS:  Pertinent items are noted in the History of Present Illness.     Past Medical History:   Diagnosis Date    Arthritis     Chronic pain     Diabetes (HCC)     Essential hypertension, malignant 4/6/2011    Hypertension     MI, old 2017    Pre-operative cardiovascular examination 4/6/2011    Psychiatric disorder     DEPRESSION/ANXIETY    PUD (peptic ulcer disease)     1970S    Pure hypercholesterolemia 4/6/2011    Type II or unspecified type diabetes mellitus without mention of complication, not stated as uncontrolled 4/6/2011      Past Surgical History:   Procedure Laterality Date    DILATION AND CURETTAGE OF UTERUS      HEENT      EARS AS CHILD    NEUROLOGICAL SURGERY      CERV. FUSION    LA UNLISTED PROCEDURE ABDOMEN PERITONEUM & OMENTUM      BOWEL OBSTRUCTION X2    LA UNLISTED PROCEDURE ABDOMEN PERITONEUM & OMENTUM      GASTRIC BYPASS    LA UNLISTED PROCEDURE ABDOMEN PERITONEUM & OMENTUM      LAP TIEN    LA UNLISTED PROCEDURE ABDOMEN PERITONEUM & OMENTUM      HERNIA     SALPINGO-OOPHORECTOMY      TONSILLECTOMY       Prior to Admission  making was performed, which includes reviewing the patient's available past medical records, laboratory results, and imaging studies.    Rhabdomyolysis  CKD Stage 3  - admitted inpatient [facility] telemetry  - due to prolonged time on toilet  - CK 4448 yesterday; trend  - start IVF  - Cr at baseline    Syncope and collapse  Chronic bifascicular block  Pulmonary hypertension  - pt with multiple episodes of syncope in the last 2 weeks  - CT head no acute process  - check TTE, orthostats, TSH, B12/folate  - CTA chest no PE but noted pulmonary HTN  - IVF    Hypokalemia  - pt not given replacement at [facility] yesterday  - check labs with magnesium today and replete prn    Afebrile leucocytosis  - unclear etiology, possibly reactive  - CTA chest and UA without infectious process    HTN, uncontrolled  - continue home carvedilol and may need to uptitrate  - prn IV hydralazine    T2DM, uncontrolled with hyperglycemia  - check A1c, POC glucose with SSI  - seems to be diet-controlled as no meds on PTA med list    Chronic HFpEF  - no e/o acute exacerbation  - check TTE  - continue home carvedilol  - not on ACEi/ARB    DIET: ADULT DIET; Regular; 4 carb choices (60 gm/meal)   ISOLATION PRECAUTIONS: No active isolations  CODE STATUS: Limited no intubation but CPR, etc ok per d/w patient today   Central Line:   none  DVT PROPHYLAXIS: Lovenox  FUNCTIONAL STATUS PRIOR TO HOSPITALIZATION: Fully active and ambulatory; able to carry on all self-care without restriction.  Ambulatory status/function: By self   EARLY MOBILITY ASSESSMENT: Recommend an assessment from physical therapy and/or occupational therapy  ANTICIPATED DISCHARGE: 24-48 hours.  ANTICIPATED DISPOSITION: TBD; lives alone  EMERGENCY CONTACT/SURROGATE DECISION MAKER: Lesly Saenz (Friend)  [phone] (Mobile)       Signed By: Samina Kirk MD     April 4, 2024         Please note that this dictation may have been completed with Dragon, the [employer] voice recognition software.

## 2024-04-05 ENCOUNTER — APPOINTMENT (OUTPATIENT)
Facility: HOSPITAL | Age: 84
End: 2024-04-05
Attending: INTERNAL MEDICINE
Payer: MEDICARE

## 2024-04-05 VITALS
OXYGEN SATURATION: 98 % | BODY MASS INDEX: 33.18 KG/M2 | WEIGHT: 169 LBS | SYSTOLIC BLOOD PRESSURE: 155 MMHG | RESPIRATION RATE: 23 BRPM | TEMPERATURE: 97.6 F | HEART RATE: 67 BPM | DIASTOLIC BLOOD PRESSURE: 73 MMHG | HEIGHT: 60 IN

## 2024-04-05 PROBLEM — R55 SYNCOPE AND COLLAPSE: Status: RESOLVED | Noted: 2024-04-03 | Resolved: 2024-04-05

## 2024-04-05 LAB
ALBUMIN SERPL-MCNC: 2.9 G/DL (ref 3.5–5)
ALBUMIN/GLOB SERPL: 1.1 (ref 1.1–2.2)
ALP SERPL-CCNC: 84 U/L (ref 45–117)
ALT SERPL-CCNC: 29 U/L (ref 12–78)
ANION GAP SERPL CALC-SCNC: 5 MMOL/L (ref 5–15)
AST SERPL-CCNC: 80 U/L (ref 15–37)
BASOPHILS # BLD: 0.1 K/UL (ref 0–0.1)
BASOPHILS NFR BLD: 1 % (ref 0–1)
BILIRUB SERPL-MCNC: 0.4 MG/DL (ref 0.2–1)
BUN SERPL-MCNC: 12 MG/DL (ref 6–20)
BUN/CREAT SERPL: 14 (ref 12–20)
CALCIUM SERPL-MCNC: 8.8 MG/DL (ref 8.5–10.1)
CHLORIDE SERPL-SCNC: 110 MMOL/L (ref 97–108)
CK SERPL-CCNC: 1238 U/L (ref 26–192)
CO2 SERPL-SCNC: 26 MMOL/L (ref 21–32)
CREAT SERPL-MCNC: 0.84 MG/DL (ref 0.55–1.02)
DIFFERENTIAL METHOD BLD: ABNORMAL
ECHO AO ROOT DIAM: 3.3 CM
ECHO AO ROOT INDEX: 1.9 CM/M2
ECHO AV AREA PEAK VELOCITY: 3.5 CM2
ECHO AV AREA/BSA PEAK VELOCITY: 2 CM2/M2
ECHO AV PEAK GRADIENT: 9 MMHG
ECHO AV PEAK VELOCITY: 1.5 M/S
ECHO AV VELOCITY RATIO: 0.73
ECHO BSA: 1.8 M2
ECHO EST RA PRESSURE: 3 MMHG
ECHO LA DIAMETER INDEX: 2.47 CM/M2
ECHO LA DIAMETER: 4.3 CM
ECHO LA TO AORTIC ROOT RATIO: 1.3
ECHO LA VOL A-L A2C: 58 ML (ref 22–52)
ECHO LA VOL A-L A4C: 101 ML (ref 22–52)
ECHO LA VOL MOD A2C: 58 ML (ref 22–52)
ECHO LA VOL MOD A4C: 94 ML (ref 22–52)
ECHO LA VOLUME AREA LENGTH: 78 ML
ECHO LA VOLUME INDEX A-L A2C: 33 ML/M2 (ref 16–34)
ECHO LA VOLUME INDEX A-L A4C: 58 ML/M2 (ref 16–34)
ECHO LA VOLUME INDEX AREA LENGTH: 45 ML/M2 (ref 16–34)
ECHO LA VOLUME INDEX MOD A2C: 33 ML/M2 (ref 16–34)
ECHO LA VOLUME INDEX MOD A4C: 54 ML/M2 (ref 16–34)
ECHO LV E' LATERAL VELOCITY: 8 CM/S
ECHO LV E' SEPTAL VELOCITY: 6 CM/S
ECHO LV FRACTIONAL SHORTENING: 37 % (ref 28–44)
ECHO LV INTERNAL DIMENSION DIASTOLE INDEX: 2.36 CM/M2
ECHO LV INTERNAL DIMENSION DIASTOLIC: 4.1 CM (ref 3.9–5.3)
ECHO LV INTERNAL DIMENSION SYSTOLIC INDEX: 1.49 CM/M2
ECHO LV INTERNAL DIMENSION SYSTOLIC: 2.6 CM
ECHO LV IVSD: 1.1 CM (ref 0.6–0.9)
ECHO LV MASS 2D: 161.4 G (ref 67–162)
ECHO LV MASS INDEX 2D: 92.7 G/M2 (ref 43–95)
ECHO LV POSTERIOR WALL DIASTOLIC: 1.2 CM (ref 0.6–0.9)
ECHO LV RELATIVE WALL THICKNESS RATIO: 0.59
ECHO LVOT AREA: 4.9 CM2
ECHO LVOT DIAM: 2.5 CM
ECHO LVOT PEAK GRADIENT: 5 MMHG
ECHO LVOT PEAK VELOCITY: 1.1 M/S
ECHO MV A VELOCITY: 1.45 M/S
ECHO MV AREA PHT: 2.8 CM2
ECHO MV E DECELERATION TIME (DT): 275.2 MS
ECHO MV E VELOCITY: 0.95 M/S
ECHO MV E/A RATIO: 0.66
ECHO MV E/E' LATERAL: 11.88
ECHO MV E/E' RATIO (AVERAGED): 13.85
ECHO MV PRESSURE HALF TIME (PHT): 79.8 MS
ECHO PV MAX VELOCITY: 0.7 M/S
ECHO PV PEAK GRADIENT: 2 MMHG
ECHO RIGHT VENTRICULAR SYSTOLIC PRESSURE (RVSP): 36 MMHG
ECHO RV FREE WALL PEAK S': 8 CM/S
ECHO RV INTERNAL DIMENSION: 3.9 CM
ECHO RV TAPSE: 1.9 CM (ref 1.7–?)
ECHO TV REGURGITANT MAX VELOCITY: 2.88 M/S
ECHO TV REGURGITANT PEAK GRADIENT: 33 MMHG
EOSINOPHIL # BLD: 0.5 K/UL (ref 0–0.4)
EOSINOPHIL NFR BLD: 6 % (ref 0–7)
ERYTHROCYTE [DISTWIDTH] IN BLOOD BY AUTOMATED COUNT: 20.4 % (ref 11.5–14.5)
EST. AVERAGE GLUCOSE BLD GHB EST-MCNC: 148 MG/DL
GLOBULIN SER CALC-MCNC: 2.6 G/DL (ref 2–4)
GLUCOSE BLD STRIP.AUTO-MCNC: 132 MG/DL (ref 65–117)
GLUCOSE BLD STRIP.AUTO-MCNC: 161 MG/DL (ref 65–117)
GLUCOSE SERPL-MCNC: 131 MG/DL (ref 65–100)
HBA1C MFR BLD: 6.8 % (ref 4–5.6)
HCT VFR BLD AUTO: 30.2 % (ref 35–47)
HGB BLD-MCNC: 9.6 G/DL (ref 11.5–16)
IMM GRANULOCYTES # BLD AUTO: 0 K/UL (ref 0–0.04)
IMM GRANULOCYTES NFR BLD AUTO: 0 % (ref 0–0.5)
LYMPHOCYTES # BLD: 2.6 K/UL (ref 0.8–3.5)
LYMPHOCYTES NFR BLD: 31 % (ref 12–49)
MCH RBC QN AUTO: 25.1 PG (ref 26–34)
MCHC RBC AUTO-ENTMCNC: 31.8 G/DL (ref 30–36.5)
MCV RBC AUTO: 78.9 FL (ref 80–99)
MONOCYTES # BLD: 0.7 K/UL (ref 0–1)
MONOCYTES NFR BLD: 8 % (ref 5–13)
NEUTS SEG # BLD: 4.6 K/UL (ref 1.8–8)
NEUTS SEG NFR BLD: 54 % (ref 32–75)
NRBC # BLD: 0 K/UL (ref 0–0.01)
NRBC BLD-RTO: 0 PER 100 WBC
PLATELET # BLD AUTO: 410 K/UL (ref 150–400)
PMV BLD AUTO: 8.9 FL (ref 8.9–12.9)
POTASSIUM SERPL-SCNC: 3.9 MMOL/L (ref 3.5–5.1)
PROT SERPL-MCNC: 5.5 G/DL (ref 6.4–8.2)
RBC # BLD AUTO: 3.83 M/UL (ref 3.8–5.2)
RBC MORPH BLD: ABNORMAL
SERVICE CMNT-IMP: ABNORMAL
SERVICE CMNT-IMP: ABNORMAL
SODIUM SERPL-SCNC: 141 MMOL/L (ref 136–145)
WBC # BLD AUTO: 8.5 K/UL (ref 3.6–11)

## 2024-04-05 PROCEDURE — 36415 COLL VENOUS BLD VENIPUNCTURE: CPT

## 2024-04-05 PROCEDURE — 97530 THERAPEUTIC ACTIVITIES: CPT

## 2024-04-05 PROCEDURE — 6360000002 HC RX W HCPCS: Performed by: INTERNAL MEDICINE

## 2024-04-05 PROCEDURE — 6370000000 HC RX 637 (ALT 250 FOR IP): Performed by: INTERNAL MEDICINE

## 2024-04-05 PROCEDURE — 83036 HEMOGLOBIN GLYCOSYLATED A1C: CPT

## 2024-04-05 PROCEDURE — 82550 ASSAY OF CK (CPK): CPT

## 2024-04-05 PROCEDURE — 2580000003 HC RX 258: Performed by: INTERNAL MEDICINE

## 2024-04-05 PROCEDURE — 6370000000 HC RX 637 (ALT 250 FOR IP)

## 2024-04-05 PROCEDURE — G0378 HOSPITAL OBSERVATION PER HR: HCPCS

## 2024-04-05 PROCEDURE — 96372 THER/PROPH/DIAG INJ SC/IM: CPT

## 2024-04-05 PROCEDURE — 93306 TTE W/DOPPLER COMPLETE: CPT

## 2024-04-05 PROCEDURE — 80053 COMPREHEN METABOLIC PANEL: CPT

## 2024-04-05 PROCEDURE — 97116 GAIT TRAINING THERAPY: CPT

## 2024-04-05 PROCEDURE — 97161 PT EVAL LOW COMPLEX 20 MIN: CPT

## 2024-04-05 PROCEDURE — 93306 TTE W/DOPPLER COMPLETE: CPT | Performed by: SPECIALIST

## 2024-04-05 PROCEDURE — 97535 SELF CARE MNGMENT TRAINING: CPT

## 2024-04-05 PROCEDURE — 96361 HYDRATE IV INFUSION ADD-ON: CPT

## 2024-04-05 PROCEDURE — 82962 GLUCOSE BLOOD TEST: CPT

## 2024-04-05 PROCEDURE — 85025 COMPLETE CBC W/AUTO DIFF WBC: CPT

## 2024-04-05 PROCEDURE — 97165 OT EVAL LOW COMPLEX 30 MIN: CPT

## 2024-04-05 RX ORDER — LANOLIN ALCOHOL/MO/W.PET/CERES
3 CREAM (GRAM) TOPICAL NIGHTLY PRN
Status: DISCONTINUED | OUTPATIENT
Start: 2024-04-05 | End: 2024-04-05 | Stop reason: HOSPADM

## 2024-04-05 RX ORDER — CARVEDILOL 12.5 MG/1
25 TABLET ORAL 2 TIMES DAILY WITH MEALS
Status: DISCONTINUED | OUTPATIENT
Start: 2024-04-05 | End: 2024-04-05 | Stop reason: HOSPADM

## 2024-04-05 RX ORDER — CARVEDILOL 12.5 MG/1
12.5 TABLET ORAL
Status: DISCONTINUED | OUTPATIENT
Start: 2024-04-05 | End: 2024-04-05 | Stop reason: HOSPADM

## 2024-04-05 RX ORDER — CARVEDILOL 25 MG/1
25 TABLET ORAL 2 TIMES DAILY WITH MEALS
Qty: 60 TABLET | Refills: 1 | Status: SHIPPED | OUTPATIENT
Start: 2024-04-05

## 2024-04-05 RX ADMIN — Medication 3 MG: at 02:11

## 2024-04-05 RX ADMIN — HYDRALAZINE HYDROCHLORIDE 10 MG: 20 INJECTION INTRAMUSCULAR; INTRAVENOUS at 01:58

## 2024-04-05 RX ADMIN — SODIUM CHLORIDE, PRESERVATIVE FREE 10 ML: 5 INJECTION INTRAVENOUS at 09:01

## 2024-04-05 RX ADMIN — BUSPIRONE HYDROCHLORIDE 10 MG: 10 TABLET ORAL at 13:36

## 2024-04-05 RX ADMIN — THERA TABS 1 TABLET: TAB at 08:57

## 2024-04-05 RX ADMIN — Medication 2000 UNITS: at 08:57

## 2024-04-05 RX ADMIN — DULOXETINE HYDROCHLORIDE 60 MG: 60 CAPSULE, DELAYED RELEASE ORAL at 08:57

## 2024-04-05 RX ADMIN — BUSPIRONE HYDROCHLORIDE 10 MG: 10 TABLET ORAL at 08:57

## 2024-04-05 RX ADMIN — CARVEDILOL 12.5 MG: 12.5 TABLET, FILM COATED ORAL at 08:57

## 2024-04-05 RX ADMIN — ASPIRIN 81 MG: 81 TABLET, COATED ORAL at 08:57

## 2024-04-05 RX ADMIN — ENOXAPARIN SODIUM 40 MG: 100 INJECTION SUBCUTANEOUS at 08:57

## 2024-04-05 RX ADMIN — OXYCODONE HYDROCHLORIDE AND ACETAMINOPHEN 500 MG: 500 TABLET ORAL at 08:57

## 2024-04-05 RX ADMIN — ATORVASTATIN CALCIUM 40 MG: 40 TABLET, FILM COATED ORAL at 08:57

## 2024-04-05 RX ADMIN — SODIUM CHLORIDE: 9 INJECTION, SOLUTION INTRAVENOUS at 03:42

## 2024-04-05 ASSESSMENT — PAIN SCALES - GENERAL: PAINLEVEL_OUTOF10: 0

## 2024-04-05 NOTE — CARE COORDINATION
Transition of Care Plan:    Home, alone  - Home health PT/OT - Referral sent to [facility]    Transport: Friend    RUR: 14%  Prior Level of Functioning: Independent  Disposition: Home health  Follow up appointments: PCP  DME needed: None  Transportation at discharge: Friend  IM/IMM Medicare/ letter given: 4/4, now Obs  Caregiver Contact: Lesly Santos [phone]   Discharge Caregiver contacted prior to discharge?   Care Conference needed? No  Barriers to discharge: Medical    PT/OT recommending home health. Pt prefers [facility]. Referral sent.    2:07pm: [facility] not able to accept Pt. No preference of provider, referral sent to [facility].       04/05/24 3970   Condition of Participation: Discharge Planning   The Plan for Transition of Care is related to the following treatment goals: Home health   The Patient and/or Patient Representative was provided with a Choice of Provider? Patient   The Patient and/Or Patient Representative agree with the Discharge Plan? Yes   Freedom of Choice list was provided with basic dialogue that supports the patient's individualized plan of care/goals, treatment preferences, and shares the quality data associated with the providers?  Yes       ROSE Conway (Ally).

## 2024-04-05 NOTE — DISCHARGE INSTRUCTIONS
Discharge Instructions       PATIENT ID: Tayler Marinelli  MRN: [id number]   YOB: 1940    DATE OF ADMISSION: 4/3/2024   DATE OF DISCHARGE: 4/5/2024  PRIMARY CARE PROVIDER: Patricia Mcnair   ATTENDING PHYSICIAN: Bin Raines MD   DISCHARGING PROVIDER: ELMIRA Hartmann NP      DISCHARGE DIAGNOSES   Syncope  Rhabdomyolosis    CONSULTATIONS: none    PROCEDURES/SURGERIES: * No surgery found *    PENDING TEST RESULTS:   At the time of discharge the following test results are still pending: none    FOLLOW UP APPOINTMENTS:    Follow-up Information       Follow up With Specialties Details Why Contact Info    Patricia Mcnair, DO Family Medicine Follow up in 1 week(s)  [facility] [address] [phone]            ADDITIONAL CARE RECOMMENDATIONS:   Take medications as directed    Check Blood pressures daily, keep record to review with PCP  Your Coreg was increased to 25 mg po BID due to elevated BPs    DIET: cardiac diet, no concentrated sweets.  Drink fluids    ACTIVITY: activity as tolerated    WOUND CARE: none    EQUIPMENT needed: none new    DISCHARGE MEDICATIONS:   See Medication Reconciliation Form    It is important that you take the medication exactly as they are prescribed.   Keep your medication in the bottles provided by the pharmacist and keep a list of the medication names, dosages, and times to be taken in your wallet.   Do not take other medications without consulting your doctor.     NOTIFY YOUR PHYSICIAN FOR ANY OF THE FOLLOWING:   Fever over 101 degrees for 24 hours.   Chest pain, shortness of breath, fever, chills, nausea, vomiting, diarrhea, change in mentation, falling, weakness, bleeding. Severe pain or pain not relieved by medications.  Or, any other signs or symptoms that you may have questions about.    DISPOSITION:   xx Home With:  xx OT xx PT xx HH  RN       SNF/Inpatient Rehab/LTAC    Independent/assisted living    Hospice    Other:     Signed:   Racheal

## 2024-04-05 NOTE — PLAN OF CARE
Problem: Discharge Planning  Goal: Discharge to home or other facility with appropriate resources  Outcome: Progressing  Flowsheets (Taken 4/4/2024 2000)  Discharge to home or other facility with appropriate resources: Identify barriers to discharge with patient and caregiver     Problem: Chronic Conditions and Co-morbidities  Goal: Patient's chronic conditions and co-morbidity symptoms are monitored and maintained or improved  Outcome: Progressing  Flowsheets (Taken 4/4/2024 2000)  Care Plan - Patient's Chronic Conditions and Co-Morbidity Symptoms are Monitored and Maintained or Improved: Monitor and assess patient's chronic conditions and comorbid symptoms for stability, deterioration, or improvement     Problem: Pain  Goal: Verbalizes/displays adequate comfort level or baseline comfort level  Outcome: Progressing

## 2024-04-05 NOTE — PLAN OF CARE
Problem: Occupational Therapy - Adult  Goal: By Discharge: Performs self-care activities at highest level of function for planned discharge setting.  See evaluation for individualized goals.  Description: FUNCTIONAL STATUS PRIOR TO ADMISSION:  Patient was INDP with ADLs and IADLs.  Reports multiple falls in the home.   ADL Assistance: Independent, Homemaking Assistance: Independent, Ambulation Assistance: Independent, Transfer Assistance: Independent, Active : Yes      HOME SUPPORT: Patient lived alone. Has a supportive neighbor who has assisted her s/p falls in the past.     Occupational Therapy Goals  Initiated 4/5/2024   1.  Patient will perform lower body dressing with Charles within 7 day(s).  2.  Patient will perform upper body dressing with Charles within 7 day(s).  3.  Patient will perform bathing with Charles within 7 day(s).  4.  Patient will perform toilet transfers with Charles within 7 day(s).  5.  Patient will perform all aspects of toileting with Charles within 7 day(s).  6.  Patient will participate in upper extremity therapeutic exercise/activities with Charles within 7 day(s).    7.  Patient will utilize energy conservation techniques during functional activities with verbal cues within 7 day(s).  8.  Patient will improve their Fugl Frederick score by 3-4 points in prep for ADLs within 7 days.    Outcome: Progressing   OCCUPATIONAL THERAPY EVALUATION    Patient: Tayler Marinelli (84 y.o. female)  Date: 4/5/2024  Primary Diagnosis: Syncope and collapse [R55]  Dehydration [E86.0]  General weakness [R53.1]  Non-traumatic rhabdomyolysis [M62.82]         Precautions: Fall Risk      ASSESSMENT :  The patient is limited by decreased functional mobility, independence in ADLs, high-level IADLs, strength, activity tolerance, endurance, coordination, balance following admission after episode of prolonged sitting on commode and inability to ascend from toilet seat. MRI  4/6/2011    Hypertension     MI, old 2017    Pre-operative cardiovascular examination 4/6/2011    Psychiatric disorder     DEPRESSION/ANXIETY    PUD (peptic ulcer disease)     1970S    Pure hypercholesterolemia 4/6/2011    Type II or unspecified type diabetes mellitus without mention of complication, not stated as uncontrolled 4/6/2011     Past Surgical History:   Procedure Laterality Date    DILATION AND CURETTAGE OF UTERUS      HEENT      EARS AS CHILD    NEUROLOGICAL SURGERY      CERV. FUSION    TX UNLISTED PROCEDURE ABDOMEN PERITONEUM & OMENTUM      BOWEL OBSTRUCTION X2    TX UNLISTED PROCEDURE ABDOMEN PERITONEUM & OMENTUM      GASTRIC BYPASS    TX UNLISTED PROCEDURE ABDOMEN PERITONEUM & OMENTUM      LAP TIEN    TX UNLISTED PROCEDURE ABDOMEN PERITONEUM & OMENTUM      HERNIA     SALPINGO-OOPHORECTOMY      TONSILLECTOMY            Expanded or extensive additional review of patient history:   Social/Functional History  Lives With: Alone  Type of Home: House ([facility])  Home Layout: Two level  Home Access: Stairs to enter with rails  Entrance Stairs - Number of Steps: 1  Bathroom Shower/Tub: Walk-in shower, Shower chair with back  Bathroom Equipment: None  Home Equipment: Cane, Walker, rolling, Wheelchair-manual  Has the patient had two or more falls in the past year or any fall with injury in the past year?: Yes  ADL Assistance: Independent  Homemaking Assistance: Independent  Homemaking Responsibilities: Yes  Ambulation Assistance: Independent  Transfer Assistance: Independent  Active : Yes  Mode of Transportation: Car  Occupation: Retired      Hand Dominance: right     EXAMINATION OF PERFORMANCE DEFICITS:    Cognitive/Behavioral Status:  Orientation  Overall Orientation Status: Within Normal Limits  Orientation Level: Oriented X4  Cognition  Overall Cognitive Status: Exceptions  Arousal/Alertness: Appropriate responses to stimuli  Following Commands: Follows all commands without difficulty  Attention  event. It has established value to characterize functional status and for measuring spontaneous and therapy-induced recovery; tests proximal and distal motor functions. Fugl-Frederick Assessment - UE scores recorded between five and 30 days post neurologic event can be used to predict UE recovery at six months post neurologic event.  Severe = 0-21 points   Moderately Severe = 22-33 points   Moderate = 34-47 points   Mild = 48-66 points  JUAN C Gonsalez, ZELDA Centeno, SANJEEV Navarrete, KULWANT Ellison, & ABIMAEL Arana (1992). Measurement of motor recovery after stroke: Outcome assessment and sample size requirements. Stroke, 23, pp. 4945-2279.   --------------------------------------------------------------------------------------------------------------------------------------------------------------------  MCID:  Stroke:   (Imer et al, 2001; n = 171; mean age 70 (11) years; assessed within 17 (12) days of stroke, Acute Stroke)  FMA Motor Scores from Admission to Discharge   10 point increase in FMA Upper Extremity = 1.5 change in discharge FIM   10 point increase in FMA Lower Extremity = 1.9 change in discharge FIM  MDC:   Stroke:   (Axel et al, 2008, n = 14, mean age = 59.9 (14.6) years, assessed on average 14 (6.5) months post stroke, Chronic Stroke)   FMA = 5.2 points for the Upper Extremity portion of the assessment                                                                                                                                                                                                                         Pain Rating:  Patient did not endorse pain    Activity Tolerance:   Good and requires rest breaks    After treatment:   Patient left in no apparent distress sitting up in chair, Call bell within reach, and Bed/ chair alarm activated    COMMUNICATION/EDUCATION:   The patient's plan of care was discussed with: physical therapist, occupational therapist, and registered nurse    Patient

## 2024-04-05 NOTE — PLAN OF CARE
Problem: Discharge Planning  Goal: Discharge to home or other facility with appropriate resources  4/5/2024 1116 by Donnell Layne RN  Outcome: Progressing  4/5/2024 0009 by Kay Olivarez RN  Outcome: Progressing  Flowsheets (Taken 4/4/2024 2000)  Discharge to home or other facility with appropriate resources: Identify barriers to discharge with patient and caregiver     Problem: Chronic Conditions and Co-morbidities  Goal: Patient's chronic conditions and co-morbidity symptoms are monitored and maintained or improved  4/5/2024 1116 by Donnell Layne RN  Outcome: Progressing  4/5/2024 0009 by Kay Olivarez RN  Outcome: Progressing  Flowsheets (Taken 4/4/2024 2000)  Care Plan - Patient's Chronic Conditions and Co-Morbidity Symptoms are Monitored and Maintained or Improved: Monitor and assess patient's chronic conditions and comorbid symptoms for stability, deterioration, or improvement     Problem: Pain  Goal: Verbalizes/displays adequate comfort level or baseline comfort level  4/5/2024 1116 by Donnell Layne RN  Outcome: Progressing  4/5/2024 0009 by Kay Olivarez RN  Outcome: Progressing     Problem: Safety - Adult  Goal: Free from fall injury  Outcome: Progressing

## 2024-04-05 NOTE — PROGRESS NOTES
Orders received, chart reviewed and patient evaluated by occupational therapy. Pending progression with skilled acute occupational therapy, recommend:  Therapy 2 days/week in the home    Recommend with nursing patient to complete as able in order to maintain strength, endurance and independence: OOB to chair 3x/day, ADLs with supervision/setup and mobilizing to the bathroom for toileting with CGA-SPV assist. Thank you for your assistance.     Full evaluation to follow.   Kami Cardoza, VIN, OTR/L

## 2024-04-05 NOTE — DISCHARGE SUMMARY
Discharge Summary     PATIENT ID: Tayler Marinelli  MRN: [id number]   YOB: 1940    DATE OF ADMISSION: 4/3/2024  4:18 PM    DATE OF DISCHARGE: 4/5/2024   PRIMARY CARE PROVIDER: Patricia Mcnair DO   ATTENDING PHYSICIAN: Bin Raines  DISCHARGING PROVIDER: ELMIRA Hartmann - NP      CONSULTATIONS: IP CONSULT TO CASE MANAGEMENT    PROCEDURES/SURGERIES: * No surgery found *     ADMITTING DIAGNOSES & HOSPITAL COURSE:     Ms. Marinelli is an 84-year-old female with a history of arthritis, chronic pain, obesity (s/p gastric bypass), type 2 diabetes, hypertension, CAD, depression/anxiety, chronic HFpEF, PUD, hyperlipidemia and bowel obstruction who was brought in by [facility] 4[facility]3/2024 as patient was on the toilet from 10:30 PM the previous night to 3:30 in the afternoon due to weakness and inability to get up.  Neighbor had come over to check on her and called EMS.  She reports a dry cough.  She also reports multiple syncopal episodes in the last 2 weeks but no trauma.     DISCHARGE DIAGNOSES / PLAN:      Rhabdomyolysis  CKD Stage 3  - Rhbodo due to prolonged time on toilet  - CK 4448 yesterday -> 1238 today  - continue IVF through discharge today, encouraged po intakes on discharge  - Cr at baseline     Syncope and collapse  Chronic bifascicular block  Pulmonary hypertension  - pt with multiple episodes of syncope in the last 2 weeks - reported today that she never lost consciousness and remembers falls. Does not use cane/walker  - CT head no acute process  - Echo: Normal left ventricular systolic function with EF of 60 to 65%.  Diastolic dysfunction is present.  Left atrium mildly dilated.  - does not appear to have orthostatic changes  - CTA chest no PE but noted pulmonary HTN     Hypokalemia  - resolved now post replacement     Afebrile leucocytosis  - unclear etiology, possibly reactive  - CTA chest and UA without infectious process  - resolved     HTN, uncontrolled  Chronic  HFpEF  - no s/s acute exacerbation  - Echo: Normal left ventricular systolic function with EF of 60 to 65%.  Diastolic dysfunction is present.  Left atrium mildly dilated.  - continue carvedilol but increase to 25 mg po BID  - not on ACEi/ARB  - follow up with PCP     T2DM, uncontrolled with hyperglycemia  - A1c 6.8  - POC glucose (range 105-156)  - seems to be diet-controlled as no meds on PTA med list        PENDING TEST RESULTS:   At the time of discharge the following test results are still pending: none    FOLLOW UP APPOINTMENTS:    Follow-up Information       Follow up With Specialties Details Why Contact Info    Patricia Mcnair, DO Family Medicine Follow up in 1 week(s)  [facility] [place] [id number] [phone]            ADDITIONAL CARE RECOMMENDATIONS:   Take medications as directed    Check Blood pressures daily, keep record to review with PCP  Your Coreg was increased to 25 mg po BID due to elevated BPs    DIET: cardiac diet, no concentrated sweets.  Drink fluids    ACTIVITY: activity as tolerated    WOUND CARE: none    EQUIPMENT needed:     DISCHARGE MEDICATIONS:     Medication List        CHANGE how you take these medications      carvedilol 25 MG tablet  Commonly known as: COREG  Take 1 tablet by mouth 2 times daily (with meals)  What changed:   medication strength  how much to take            CONTINUE taking these medications      aspirin 81 MG EC tablet     atorvastatin 40 MG tablet  Commonly known as: LIPITOR     busPIRone 10 MG tablet  Commonly known as: BUSPAR     Cholecalciferol 50 MCG (2000 UT) Tabs     DULoxetine 60 MG extended release capsule  Commonly known as: CYMBALTA  TAKE 1 CAPSULE BY MOUTH DAILY     methocarbamol 500 MG tablet  Commonly known as: ROBAXIN  TAKE 1 TABLET BY MOUTH FOUR TIMES DAILY AS NEEDED FOR BACK PAIN     MULTIVITAMIN PO     traZODone 100 MG tablet  Commonly known as: DESYREL  Take 2 tablets by mouth nightly     VITAMIN A PO     vitamin C 500 MG tablet  Commonly

## 2024-04-05 NOTE — PROGRESS NOTES
Stroke Education provided to patient and the following topics were discussed    1. Patients personal risk factors for stroke are diabetes mellitus and hypertension    2. Warning signs of Stroke:        * Sudden numbness or weakness of the face, arm or leg, especially on one side of          The body            * Sudden confusion, trouble speaking or understanding        * Sudden trouble seeing in one or both eyes        * Sudden trouble walking, dizziness, loss of balance or coordination        * Sudden severe headache with no known cause      3. Importance of activation Emergency Medical Services ( 9-1-1 ) immediately if experience any warning signs of stroke.    4. Be sure and schedule a follow-up appointment with your primary care doctor or any specialists as instructed.     5. You must take medicine every day to treat your risk factors for stroke.  Be sure to take your medicines exactly as your doctor tells you: no more, no less.  Know what your medicines are for , what they do.  Anti-thrombotics /anticoagulants can help prevent strokes.  You are taking the following medicine(s)  coreg     6.  Smoking and second-hand smoke greatly increase your risk of stroke, cardiovascular disease and death. Smoking history unknown     7. Information provided was Stroke Handouts    8. Documentation of teaching completed in Patient Education Activity and on Care Plan with teaching response noted?  yes

## 2024-04-05 NOTE — PLAN OF CARE
throughout entirety of session, RN notified). Patient was left up in chair at end of session with chair alarm on. Education was provided on safety with mobility at home & fall prevention. Patient expressed understanding.     Patient will benefit from skilled intervention to address the above impairments.    Functional Outcome Measure:  The patient scored 44/56 on the Mcgrath Balance Scale outcome measure which is indicative of balance impairment.           PLAN :  Recommendations and Planned Interventions:   bed mobility training, transfer training, gait training, therapeutic exercises, neuromuscular re-education, patient and family training/education, and therapeutic activities    Frequency/Duration: Patient will be followed by physical therapy to address goals, PT Plan of Care: 5 times/week to address goals.    Recommendation for discharge: (in order for the patient to meet his/her long term goals): Therapy 2x a week in the home, however, may require supervision, cognitive and/or physical assistance due to the following concerns listed below:    Other factors to consider for discharge: high risk for falls    IF patient discharges home will need the following DME: patient owns DME required for discharge (shower chair)         SUBJECTIVE:   Patient stated “Don't laugh, but I like going to [facility] [place].”    OBJECTIVE DATA SUMMARY:       Past Medical History:   Diagnosis Date    Arthritis     Chronic pain     Diabetes (HCC)     Essential hypertension, malignant 4/6/2011    Hypertension     MI, old 2017    Pre-operative cardiovascular examination 4/6/2011    Psychiatric disorder     DEPRESSION/ANXIETY    PUD (peptic ulcer disease)     1970S    Pure hypercholesterolemia 4/6/2011    Type II or unspecified type diabetes mellitus without mention of complication, not stated as uncontrolled 4/6/2011     Past Surgical History:   Procedure Laterality Date    DILATION AND CURETTAGE OF UTERUS      HEENT      EARS AS CHILD    NEUROLOGICAL  Mobility Raw Score : 24  AM-PAC Inpatient T-Scale Score : 61.14     Cutoff score ?171,2,3 had higher odds of discharging home with home health or need of SNF/IPR.    1. Haley Miranda, Corrie Layton, Cristiano Barber, Sheila Hugo, Davion Camejo, Wilbur Miranda.  Validity of the AM-PAC “6-Clicks” Inpatient Daily Activity and Basic Mobility Short Forms. Physical Therapy Mar 2014, 94  379-391; DOI: 10.2522/ptj.[id number]  2. Asa KILGORE, Ez REBOLLEDO, Xochitl REBOLLEDO, Ady J. Association of AM-PAC \"6-Clicks\" Basic Mobility and Daily Activity Scores With Discharge Destination. Phys Ther. 2021 4;101(4):jljt837. doi: 10.1093/ptj/jnah081. PMID: 26512761.  3. Amparo REBOLLEDO, Ric BRAVO, Isis S, Patience K, Stoney S. Activity Measure for Post-Acute Care \"6-Clicks\" Basic Mobility Scores Predict Discharge Destination After Acute Care Hospitalization in Select Patient Groups: A Retrospective, Observational Study. Arch Rehabil Res Clin Transl. 2022 16;4(3):529505. doi: 10.1016/j.arrct..583526. PMID: 15353053; PMCID: ASD3250135.  4. Ruth BROWN, Erum S, Tulio W, Vi P. AM-PAC Short Forms Manual 4.0. Revised 2020.                                                                                                                                                                                                                               Pain Ratin/10     Activity Tolerance:   Fair  and requires rest breaks - RN aware of elevated BP as below     24 0910 24 0913 24 0921   Vitals   Pulse 67 80 75   Heart Rate Source Monitor Monitor  --    BP (!) 199/79 (!) 192/73 (!) 195/88   MAP (Calculated) 119 113 124   BP Location Right upper arm Right upper arm Right upper arm   BP Upper/Lower Upper Upper Upper   BP Method Automatic Automatic Automatic   Patient Position Semi fowlers Sitting  (EOB) Sitting  (after activity)     After treatment:   Patient left in no apparent distress sitting up in chair, Call bell  within reach, Bed/ chair alarm activated, Updated patient's board on functional status and mobility recommendations, and RN aware    COMMUNICATION/EDUCATION:   The patient's plan of care was discussed with: occupational therapist and registered nurse    Patient Education  Education Given To: Patient  Education Provided: Role of Therapy;Plan of Care;Transfer Training;Fall Prevention Strategies;Energy Conservation  Education Provided Comments: Safety with mobility, recommendations to utilize step to step pattern & B HRs for stair navigation at home; recommendation for use of call system at home in case of falls given multiple recent falls and that she lives alone  Education Method: Verbal  Barriers to Learning: None  Education Outcome: Verbalized understanding    Thank you for this referral.  Lore Millard, PT  Minutes: 35      Physical Therapy Evaluation Charge Determination   History Examination Presentation Decision-Making   LOW Complexity : Zero comorbidities / personal factors that will impact the outcome / POC LOW Complexity : 1-2 Standardized tests and measures addressing body structure, function, activity limitation and / or participation in recreation  LOW Complexity : Stable, uncomplicated  AM-PAC  LOW    Based on the above components, the patient evaluation is determined to be of the following complexity level: Low

## 2024-04-05 NOTE — PROGRESS NOTES
Orders received, chart reviewed and patient evaluated by physical therapy. Pending progression with skilled acute physical therapy, recommend:  Therapy 2 days/week in the home    Recommend with nursing OOB to chair 3x/day and walking daily with 1x assist. Thank you for completing as able in order to maintain patient strength, endurance and independence.     Full evaluation to follow.      Thank you,  Lore Millard, PT, DPT, NCS

## 2024-04-08 ENCOUNTER — TELEPHONE (OUTPATIENT)
Dept: PRIMARY CARE CLINIC | Facility: CLINIC | Age: 84
End: 2024-04-08

## 2024-04-08 NOTE — TELEPHONE ENCOUNTER
Irma with [facility] called to see if Dr. Mcnair will follow the patient home since she was discharged from the hospital already.  Patient needs PT and OT.  Please call Irma with [facility] back at Ph#[phone].

## 2024-04-30 ENCOUNTER — OFFICE VISIT (OUTPATIENT)
Dept: PRIMARY CARE CLINIC | Facility: CLINIC | Age: 84
End: 2024-04-30
Payer: MEDICARE

## 2024-04-30 ENCOUNTER — TRANSCRIBE ORDERS (OUTPATIENT)
Facility: HOSPITAL | Age: 84
End: 2024-04-30

## 2024-04-30 ENCOUNTER — HOSPITAL ENCOUNTER (OUTPATIENT)
Facility: HOSPITAL | Age: 84
Discharge: HOME | End: 2024-05-03
Payer: MEDICARE

## 2024-04-30 VITALS
DIASTOLIC BLOOD PRESSURE: 72 MMHG | TEMPERATURE: 97.2 F | RESPIRATION RATE: 16 BRPM | BODY MASS INDEX: 33.14 KG/M2 | HEART RATE: 86 BPM | HEIGHT: 60 IN | SYSTOLIC BLOOD PRESSURE: 191 MMHG | WEIGHT: 168.8 LBS | OXYGEN SATURATION: 98 %

## 2024-04-30 DIAGNOSIS — M62.82 NON-TRAUMATIC RHABDOMYOLYSIS: ICD-10-CM

## 2024-04-30 DIAGNOSIS — M47.894 OTHER SPONDYLOSIS, THORACIC REGION: Primary | ICD-10-CM

## 2024-04-30 DIAGNOSIS — R26.81 GAIT INSTABILITY: ICD-10-CM

## 2024-04-30 DIAGNOSIS — D50.9 MICROCYTIC ANEMIA: ICD-10-CM

## 2024-04-30 DIAGNOSIS — M47.894 OTHER SPONDYLOSIS, THORACIC REGION: ICD-10-CM

## 2024-04-30 DIAGNOSIS — E11.9 TYPE 2 DIABETES MELLITUS WITHOUT COMPLICATION, WITHOUT LONG-TERM CURRENT USE OF INSULIN (HCC): ICD-10-CM

## 2024-04-30 DIAGNOSIS — I50.22 CHRONIC SYSTOLIC (CONGESTIVE) HEART FAILURE (HCC): Primary | ICD-10-CM

## 2024-04-30 DIAGNOSIS — F33.1 MAJOR DEPRESSIVE DISORDER, RECURRENT, MODERATE (HCC): ICD-10-CM

## 2024-04-30 PROBLEM — F33.0 MAJOR DEPRESSIVE DISORDER, RECURRENT, MILD (HCC): Status: RESOLVED | Noted: 2023-01-19 | Resolved: 2024-04-30

## 2024-04-30 LAB
ALBUMIN SERPL-MCNC: 3.5 G/DL (ref 3.5–5)
ALBUMIN/GLOB SERPL: 1.3 (ref 1.1–2.2)
ALP SERPL-CCNC: 91 U/L (ref 45–117)
ALT SERPL-CCNC: 24 U/L (ref 12–78)
ANION GAP SERPL CALC-SCNC: 4 MMOL/L (ref 5–15)
AST SERPL-CCNC: 40 U/L (ref 15–37)
BILIRUB SERPL-MCNC: 0.3 MG/DL (ref 0.2–1)
BUN SERPL-MCNC: 11 MG/DL (ref 6–20)
BUN/CREAT SERPL: 11 (ref 12–20)
CALCIUM SERPL-MCNC: 8.8 MG/DL (ref 8.5–10.1)
CHLORIDE SERPL-SCNC: 112 MMOL/L (ref 97–108)
CK SERPL-CCNC: 63 U/L (ref 26–192)
CO2 SERPL-SCNC: 26 MMOL/L (ref 21–32)
CREAT SERPL-MCNC: 1.01 MG/DL (ref 0.55–1.02)
GLOBULIN SER CALC-MCNC: 2.7 G/DL (ref 2–4)
GLUCOSE SERPL-MCNC: 103 MG/DL (ref 65–100)
POTASSIUM SERPL-SCNC: 3.8 MMOL/L (ref 3.5–5.1)
PROT SERPL-MCNC: 6.2 G/DL (ref 6.4–8.2)
SODIUM SERPL-SCNC: 142 MMOL/L (ref 136–145)

## 2024-04-30 PROCEDURE — 1123F ACP DISCUSS/DSCN MKR DOCD: CPT | Performed by: FAMILY MEDICINE

## 2024-04-30 PROCEDURE — G8399 PT W/DXA RESULTS DOCUMENT: HCPCS | Performed by: FAMILY MEDICINE

## 2024-04-30 PROCEDURE — 3044F HG A1C LEVEL LT 7.0%: CPT | Performed by: FAMILY MEDICINE

## 2024-04-30 PROCEDURE — G8417 CALC BMI ABV UP PARAM F/U: HCPCS | Performed by: FAMILY MEDICINE

## 2024-04-30 PROCEDURE — 99214 OFFICE O/P EST MOD 30 MIN: CPT | Performed by: FAMILY MEDICINE

## 2024-04-30 PROCEDURE — 3078F DIAST BP <80 MM HG: CPT | Performed by: FAMILY MEDICINE

## 2024-04-30 PROCEDURE — G8427 DOCREV CUR MEDS BY ELIG CLIN: HCPCS | Performed by: FAMILY MEDICINE

## 2024-04-30 PROCEDURE — 72072 X-RAY EXAM THORAC SPINE 3VWS: CPT

## 2024-04-30 PROCEDURE — 3077F SYST BP >= 140 MM HG: CPT | Performed by: FAMILY MEDICINE

## 2024-04-30 PROCEDURE — 1036F TOBACCO NON-USER: CPT | Performed by: FAMILY MEDICINE

## 2024-04-30 PROCEDURE — 1111F DSCHRG MED/CURRENT MED MERGE: CPT | Performed by: FAMILY MEDICINE

## 2024-04-30 PROCEDURE — 1090F PRES/ABSN URINE INCON ASSESS: CPT | Performed by: FAMILY MEDICINE

## 2024-04-30 SDOH — ECONOMIC STABILITY: FOOD INSECURITY: WITHIN THE PAST 12 MONTHS, THE FOOD YOU BOUGHT JUST DIDN'T LAST AND YOU DIDN'T HAVE MONEY TO GET MORE.: NEVER TRUE

## 2024-04-30 SDOH — ECONOMIC STABILITY: HOUSING INSECURITY
IN THE LAST 12 MONTHS, WAS THERE A TIME WHEN YOU DID NOT HAVE A STEADY PLACE TO SLEEP OR SLEPT IN A SHELTER (INCLUDING NOW)?: NO

## 2024-04-30 SDOH — ECONOMIC STABILITY: INCOME INSECURITY: HOW HARD IS IT FOR YOU TO PAY FOR THE VERY BASICS LIKE FOOD, HOUSING, MEDICAL CARE, AND HEATING?: NOT HARD AT ALL

## 2024-04-30 SDOH — ECONOMIC STABILITY: FOOD INSECURITY: WITHIN THE PAST 12 MONTHS, YOU WORRIED THAT YOUR FOOD WOULD RUN OUT BEFORE YOU GOT MONEY TO BUY MORE.: NEVER TRUE

## 2024-04-30 ASSESSMENT — PATIENT HEALTH QUESTIONNAIRE - PHQ9
SUM OF ALL RESPONSES TO PHQ QUESTIONS 1-9: 2
SUM OF ALL RESPONSES TO PHQ QUESTIONS 1-9: 2
2. FEELING DOWN, DEPRESSED OR HOPELESS: MORE THAN HALF THE DAYS
SUM OF ALL RESPONSES TO PHQ QUESTIONS 1-9: 2
1. LITTLE INTEREST OR PLEASURE IN DOING THINGS: NOT AT ALL
SUM OF ALL RESPONSES TO PHQ QUESTIONS 1-9: 2
SUM OF ALL RESPONSES TO PHQ9 QUESTIONS 1 & 2: 2

## 2024-04-30 NOTE — PROGRESS NOTES
\"Have you been to the ER, urgent care clinic since your last visit?  Hospitalized since your last visit?\"    04/03/-04/05/24 At [facility] for a fall    “Have you seen or consulted any other health care providers outside of [facility] since your last visit?”    NO            Click Here for Release [id number] Records Request

## 2024-05-01 NOTE — PROGRESS NOTES
Subjective  Tayler Marinelli is an 84 y.o. female who presents for follow up.     Pmhx : arthritis, chronic pain, obesity (s/p gastric bypass), type 2 diabetes, hypertension, CAD, CKD3, fatty liver, depression/anxiety, chronic HFpEF, pulmonary HTN, PUD, hyperlipidemia and bowel obstruction    Admission 4/3/24 with rhabdo, CKD3.  Question of syncopal event. Pt was found to have been sitting on her commode overnight. She states she did not lose consciousness.   EMS was called by a neighbor.   Echo inpatient with EF 60-65%, diastolic dysfunction, LA mildly dilated.  CTA negative for PE, but did indicate pulmonary HTN.    DM2. A1c was 6.8%. diet controlled.    CHF, CAD s/p PCI 2017. Follows with cardio, Dr Adan.  She denies chest pain, dyspnea or syncope.    Depression. Has been more down this past month. Attributes this to the anniversary of her 's death.  She has stopped cymbalta but is unsure why.     Lives alone. Functions independently. She's had a few falls this year. Generally her gait is unstable.     Allergies - reviewed:   Allergies   Allergen Reactions    Penicillin G Other (See Comments)     Black out    Risedronate Other (See Comments)     BREATHING DIFFICULTIES AND EDEMA AND HIVES         Medications - reviewed:   Current Outpatient Medications   Medication Sig    carvedilol (COREG) 25 MG tablet Take 1 tablet by mouth 2 times daily (with meals)    methocarbamol (ROBAXIN) 500 MG tablet TAKE 1 TABLET BY MOUTH FOUR TIMES DAILY AS NEEDED FOR BACK PAIN    DULoxetine (CYMBALTA) 60 MG extended release capsule TAKE 1 CAPSULE BY MOUTH DAILY    traZODone (DESYREL) 100 MG tablet Take 2 tablets by mouth nightly    vitamin C (ASCORBIC ACID) 500 MG tablet Take 1 tablet by mouth daily    Multiple Vitamin (MULTIVITAMIN PO) Take by mouth daily    VITAMIN A PO Take by mouth daily    aspirin 81 MG EC tablet Take 1 tablet by mouth 2 times daily    atorvastatin (LIPITOR) 40 MG tablet Take 1 tablet by mouth daily

## 2024-05-16 ENCOUNTER — TELEPHONE (OUTPATIENT)
Dept: PRIMARY CARE CLINIC | Facility: CLINIC | Age: 84
End: 2024-05-16

## 2024-05-16 NOTE — TELEPHONE ENCOUNTER
Dasia with [facility] is calling to check on paperwork she faxed over to have the doctor sign and send back.      Her number is [phone] and their fax number is [phone]

## 2024-05-22 ENCOUNTER — TELEPHONE (OUTPATIENT)
Dept: PRIMARY CARE CLINIC | Facility: CLINIC | Age: 84
End: 2024-05-22

## 2024-05-29 RX ORDER — METHOCARBAMOL 500 MG/1
TABLET, FILM COATED ORAL
Qty: 120 TABLET | Refills: 1 | Status: CANCELLED | OUTPATIENT
Start: 2024-05-29

## 2024-05-29 RX ORDER — TRAZODONE HYDROCHLORIDE 100 MG/1
200 TABLET ORAL NIGHTLY
Qty: 180 TABLET | Refills: 1 | Status: CANCELLED | OUTPATIENT
Start: 2024-05-29

## 2024-05-29 RX ORDER — METHOCARBAMOL 500 MG/1
500 TABLET, FILM COATED ORAL 4 TIMES DAILY PRN
Qty: 120 TABLET | Refills: 1 | Status: SHIPPED | OUTPATIENT
Start: 2024-05-29

## 2024-05-29 NOTE — TELEPHONE ENCOUNTER
Dasia from [facility] is calling again about orders.      [phone]

## 2024-05-29 NOTE — TELEPHONE ENCOUNTER
PCP: Patricia Mcnair DO    Last Visit 4/30/2024   No future appointments.    Requested Prescriptions     Pending Prescriptions Disp Refills    methocarbamol (ROBAXIN) 500 MG tablet 120 tablet 1         Other Comments: Last Refill   03/20/24

## 2024-06-17 NOTE — TELEPHONE ENCOUNTER
Last Visit 4/30/2024   No future appointments.    Requested Prescriptions     Pending Prescriptions Disp Refills    methocarbamol (ROBAXIN) 500 MG tablet [Pharmacy Med Name: METHOCARBAMOL 500MG TABLETS] 120 tablet 1     Sig: TAKE 1 TABLET BY MOUTH FOUR TIMES DAILY AS NEEDED FOR BACK PAIN         Other Comments: Last Refill 05/29/24

## 2024-06-18 RX ORDER — METHOCARBAMOL 500 MG/1
TABLET, FILM COATED ORAL
Qty: 120 TABLET | Refills: 1 | Status: SHIPPED | OUTPATIENT
Start: 2024-06-18

## 2024-06-27 RX ORDER — METHOCARBAMOL 500 MG/1
500 TABLET, FILM COATED ORAL 4 TIMES DAILY
Qty: 270 TABLET | Refills: 1 | Status: SHIPPED | OUTPATIENT
Start: 2024-06-27

## 2024-06-27 RX ORDER — DULOXETIN HYDROCHLORIDE 60 MG/1
CAPSULE, DELAYED RELEASE ORAL DAILY
Qty: 90 CAPSULE | Refills: 1 | Status: SHIPPED | OUTPATIENT
Start: 2024-06-27

## 2024-06-27 NOTE — TELEPHONE ENCOUNTER
PCP: Patricia Mcnair DO    Last Visit 4/30/2024   No future appointments.    Requested Prescriptions     Pending Prescriptions Disp Refills    methocarbamol (ROBAXIN) 500 MG tablet [Pharmacy Med Name: METHOCARBAMOL 500 MG TABLET] 270 tablet 1     Sig: TAKE 1 TABLET BY MOUTH FOUR TIMES A DAY    DULoxetine (CYMBALTA) 60 MG extended release capsule [Pharmacy Med Name: DULOXETINE HCL DR 60 MG CAP] 90 capsule 1     Sig: TAKE 1 CAPSULE BY MOUTH EVERY DAY         Other Comments: Last Refill   Methocarbamol 06/18/24  Cymbalta 02/02/24

## 2024-07-24 ENCOUNTER — TELEPHONE (OUTPATIENT)
Dept: PRIMARY CARE CLINIC | Facility: CLINIC | Age: 84
End: 2024-07-24

## 2024-07-24 RX ORDER — TIZANIDINE 2 MG/1
2 TABLET ORAL 3 TIMES DAILY PRN
Qty: 30 TABLET | Refills: 0 | Status: SHIPPED | OUTPATIENT
Start: 2024-07-24

## 2024-07-24 NOTE — TELEPHONE ENCOUNTER
Per Pharmacy fax methocarbamol is not covered by plan preferred is tizanidine and cyclobenzaprine.

## (undated) DEVICE — SYR 20ML LL STRL LF --

## (undated) DEVICE — BLADE SAW W11.2XL77.5MM THK0.76MM CUT THK1.17MM REPL RECIP

## (undated) DEVICE — ERASECAUTI INTERMIT TRAY: Brand: MEDLINE INDUSTRIES, INC.

## (undated) DEVICE — BLADE ELECTRODE: Brand: EDGE

## (undated) DEVICE — T4 HOOD

## (undated) DEVICE — PREP SKN CHLRAPRP APL 26ML STR --

## (undated) DEVICE — GLOVE SURG SZ 65 L12IN FNGR THK79MIL GRN LTX FREE

## (undated) DEVICE — BLADE SAW W0.49XL3.15IN THK0.047IN CUT THK0.047IN REPL SAG

## (undated) DEVICE — 4-PORT MANIFOLD: Brand: NEPTUNE 2

## (undated) DEVICE — Z INACTIVE USE 2854097 SPONGE GZ W4XL4IN COT 12 PLY TYP VII WVN C FLD DSGN

## (undated) DEVICE — HYPODERMIC SAFETY NEEDLE: Brand: MAGELLAN

## (undated) DEVICE — DERMABOND SKIN ADH 0.7ML -- DERMABOND ADVANCED 12/BX

## (undated) DEVICE — HANDPIECE SET WITH BONE CLEANING TIP AND SUCTION TUBE: Brand: INTERPULSE

## (undated) DEVICE — SUTURE VCRL 1 L27IN ABSRB CT BRAID COAT UD J281H

## (undated) DEVICE — SYR LR LCK 1ML GRAD NSAF 30ML --

## (undated) DEVICE — C-ARM: Brand: UNBRANDED

## (undated) DEVICE — SUTURE STRATAFIX SYMMETRIC PDS + SZ 1 L18IN ABSRB VLT L48MM SXPP1A400

## (undated) DEVICE — COVER,MAYO STAND,STERILE: Brand: MEDLINE

## (undated) DEVICE — SUTURE VCRL SZ 2-0 L36IN ABSRB UD L40MM CT 1/2 CIR J957H

## (undated) DEVICE — DRESSING HYDROCOLLOID BORDER 35X10 IN ALUM PRIMASEAL

## (undated) DEVICE — GLOVE SURG SZ 65 L12IN FNGR THK94MIL STD WHT LTX FREE

## (undated) DEVICE — 6619 2 PTNT ISO SYS INCISE AREA&LT;(&GT;&&LT;)&GT;P: Brand: STERI-DRAPE™ IOBAN™ 2

## (undated) DEVICE — CONTAINER,SPECIMEN,3OZ,OR STRL: Brand: MEDLINE

## (undated) DEVICE — DRSG POSTOP PRMSL AG 3.5X14IN

## (undated) DEVICE — GOWN,SIRUS,NONRNF,SETINSLV,2XL,18/CS: Brand: MEDLINE

## (undated) DEVICE — SUTURE ETHBND EXCEL SZ 2 L30IN NONABSORBABLE GRN L40MM V-37 MX69G

## (undated) DEVICE — BRUSH SCRB DRY NL CLN LF GRN --

## (undated) DEVICE — TOTAL TRAY, 16FR 10ML SIL FOLEY, URN: Brand: MEDLINE

## (undated) DEVICE — SOLUTION SURG PREP 26 CC PURPREP

## (undated) DEVICE — TOTAL JOINT - SMH: Brand: MEDLINE INDUSTRIES, INC.

## (undated) DEVICE — Device: Brand: JELCO

## (undated) DEVICE — PADDING CAST SPEC 6INX4YD COT --

## (undated) DEVICE — GLOVE SURG SZ 8 L12IN FNGR THK94MIL STD WHT LTX FREE

## (undated) DEVICE — BIPOLAR SEALER 23-112-1 AQM 6.0: Brand: AQUAMANTYS ®

## (undated) DEVICE — 3M™ STERI-DRAPE™ U-DRAPE 1015: Brand: STERI-DRAPE™

## (undated) DEVICE — SUTURE MCRYL SZ 3-0 L27IN ABSRB UD L24MM PS-1 3/8 CIR PRIM Y936H

## (undated) DEVICE — REM POLYHESIVE ADULT PATIENT RETURN ELECTRODE: Brand: VALLEYLAB

## (undated) DEVICE — GLOVE ORTHO 8   MSG9480

## (undated) DEVICE — SOLUTION IRRIG 3000ML 0.9% SOD CHL USP UROMATIC PLAS CONT